# Patient Record
Sex: MALE | Race: WHITE | NOT HISPANIC OR LATINO | Employment: FULL TIME | URBAN - METROPOLITAN AREA
[De-identification: names, ages, dates, MRNs, and addresses within clinical notes are randomized per-mention and may not be internally consistent; named-entity substitution may affect disease eponyms.]

---

## 2023-01-01 ENCOUNTER — APPOINTMENT (OUTPATIENT)
Dept: RADIOLOGY | Facility: MEDICAL CENTER | Age: 63
End: 2023-01-01
Attending: RADIOLOGY
Payer: COMMERCIAL

## 2023-01-01 ENCOUNTER — APPOINTMENT (OUTPATIENT)
Dept: RADIOLOGY | Facility: MEDICAL CENTER | Age: 63
DRG: 388 | End: 2023-01-01
Attending: HOSPITALIST
Payer: COMMERCIAL

## 2023-01-01 ENCOUNTER — HOSPITAL ENCOUNTER (INPATIENT)
Facility: MEDICAL CENTER | Age: 63
LOS: 6 days | DRG: 919 | End: 2023-09-09
Attending: STUDENT IN AN ORGANIZED HEALTH CARE EDUCATION/TRAINING PROGRAM | Admitting: STUDENT IN AN ORGANIZED HEALTH CARE EDUCATION/TRAINING PROGRAM
Payer: COMMERCIAL

## 2023-01-01 ENCOUNTER — APPOINTMENT (OUTPATIENT)
Dept: RADIOLOGY | Facility: MEDICAL CENTER | Age: 63
End: 2023-01-01
Attending: INTERNAL MEDICINE
Payer: COMMERCIAL

## 2023-01-01 ENCOUNTER — APPOINTMENT (OUTPATIENT)
Dept: ADMISSIONS | Facility: MEDICAL CENTER | Age: 63
End: 2023-01-01
Attending: INTERNAL MEDICINE
Payer: COMMERCIAL

## 2023-01-01 ENCOUNTER — APPOINTMENT (OUTPATIENT)
Dept: RADIOLOGY | Facility: MEDICAL CENTER | Age: 63
DRG: 919 | End: 2023-01-01
Payer: COMMERCIAL

## 2023-01-01 ENCOUNTER — HOSPITAL ENCOUNTER (INPATIENT)
Facility: MEDICAL CENTER | Age: 63
LOS: 4 days | DRG: 657 | End: 2023-08-06
Attending: UROLOGY | Admitting: UROLOGY
Payer: COMMERCIAL

## 2023-01-01 ENCOUNTER — APPOINTMENT (OUTPATIENT)
Dept: RADIOLOGY | Facility: MEDICAL CENTER | Age: 63
End: 2023-01-01
Payer: COMMERCIAL

## 2023-01-01 ENCOUNTER — APPOINTMENT (OUTPATIENT)
Dept: ADMISSIONS | Facility: MEDICAL CENTER | Age: 63
DRG: 657 | End: 2023-01-01
Attending: UROLOGY
Payer: COMMERCIAL

## 2023-01-01 ENCOUNTER — PHARMACY VISIT (OUTPATIENT)
Dept: PHARMACY | Facility: MEDICAL CENTER | Age: 63
End: 2023-01-01
Payer: COMMERCIAL

## 2023-01-01 ENCOUNTER — APPOINTMENT (OUTPATIENT)
Dept: RADIOLOGY | Facility: MEDICAL CENTER | Age: 63
DRG: 388 | End: 2023-01-01
Attending: FAMILY MEDICINE
Payer: COMMERCIAL

## 2023-01-01 ENCOUNTER — ANESTHESIA (OUTPATIENT)
Dept: ANESTHESIOLOGY | Facility: MEDICAL CENTER | Age: 63
DRG: 657 | End: 2023-01-01
Payer: COMMERCIAL

## 2023-01-01 ENCOUNTER — APPOINTMENT (OUTPATIENT)
Dept: RADIOLOGY | Facility: MEDICAL CENTER | Age: 63
DRG: 388 | End: 2023-01-01
Payer: COMMERCIAL

## 2023-01-01 ENCOUNTER — TELEPHONE (OUTPATIENT)
Dept: HEALTH INFORMATION MANAGEMENT | Facility: OTHER | Age: 63
End: 2023-01-01

## 2023-01-01 ENCOUNTER — HOSPITAL ENCOUNTER (OUTPATIENT)
Dept: RADIOLOGY | Facility: MEDICAL CENTER | Age: 63
End: 2023-09-06
Payer: COMMERCIAL

## 2023-01-01 ENCOUNTER — HOSPITAL ENCOUNTER (INPATIENT)
Facility: MEDICAL CENTER | Age: 63
LOS: 16 days | DRG: 388 | End: 2023-11-27
Attending: HOSPITALIST | Admitting: HOSPITALIST
Payer: COMMERCIAL

## 2023-01-01 ENCOUNTER — ANESTHESIA EVENT (OUTPATIENT)
Dept: ANESTHESIOLOGY | Facility: MEDICAL CENTER | Age: 63
End: 2023-01-01

## 2023-01-01 ENCOUNTER — ANESTHESIA (OUTPATIENT)
Dept: SURGERY | Facility: MEDICAL CENTER | Age: 63
DRG: 657 | End: 2023-01-01
Payer: COMMERCIAL

## 2023-01-01 ENCOUNTER — APPOINTMENT (OUTPATIENT)
Dept: RADIOLOGY | Facility: MEDICAL CENTER | Age: 63
DRG: 388 | End: 2023-01-01
Attending: NURSE PRACTITIONER
Payer: COMMERCIAL

## 2023-01-01 ENCOUNTER — HOSPITAL ENCOUNTER (OUTPATIENT)
Facility: MEDICAL CENTER | Age: 63
End: 2023-09-27
Attending: INTERNAL MEDICINE | Admitting: INTERNAL MEDICINE
Payer: COMMERCIAL

## 2023-01-01 ENCOUNTER — ANESTHESIA EVENT (OUTPATIENT)
Dept: SURGERY | Facility: MEDICAL CENTER | Age: 63
DRG: 657 | End: 2023-01-01
Payer: COMMERCIAL

## 2023-01-01 ENCOUNTER — APPOINTMENT (OUTPATIENT)
Dept: RADIOLOGY | Facility: MEDICAL CENTER | Age: 63
DRG: 919 | End: 2023-01-01
Attending: RADIOLOGY
Payer: COMMERCIAL

## 2023-01-01 ENCOUNTER — ANESTHESIA (OUTPATIENT)
Dept: ANESTHESIOLOGY | Facility: MEDICAL CENTER | Age: 63
End: 2023-01-01

## 2023-01-01 VITALS
WEIGHT: 159.61 LBS | BODY MASS INDEX: 21.15 KG/M2 | HEART RATE: 133 BPM | OXYGEN SATURATION: 86 % | SYSTOLIC BLOOD PRESSURE: 45 MMHG | RESPIRATION RATE: 17 BRPM | TEMPERATURE: 102.9 F | DIASTOLIC BLOOD PRESSURE: 29 MMHG | HEIGHT: 73 IN

## 2023-01-01 VITALS
HEART RATE: 85 BPM | RESPIRATION RATE: 17 BRPM | BODY MASS INDEX: 21.27 KG/M2 | SYSTOLIC BLOOD PRESSURE: 121 MMHG | WEIGHT: 160.5 LBS | TEMPERATURE: 97.7 F | HEIGHT: 73 IN | DIASTOLIC BLOOD PRESSURE: 68 MMHG | OXYGEN SATURATION: 99 %

## 2023-01-01 VITALS
OXYGEN SATURATION: 92 % | SYSTOLIC BLOOD PRESSURE: 111 MMHG | DIASTOLIC BLOOD PRESSURE: 72 MMHG | TEMPERATURE: 98.1 F | BODY MASS INDEX: 24.37 KG/M2 | RESPIRATION RATE: 17 BRPM | HEART RATE: 84 BPM | HEIGHT: 73 IN | WEIGHT: 183.86 LBS

## 2023-01-01 VITALS
RESPIRATION RATE: 17 BRPM | WEIGHT: 165.12 LBS | TEMPERATURE: 96.6 F | OXYGEN SATURATION: 100 % | SYSTOLIC BLOOD PRESSURE: 109 MMHG | BODY MASS INDEX: 22.37 KG/M2 | DIASTOLIC BLOOD PRESSURE: 74 MMHG | HEIGHT: 72 IN | HEART RATE: 66 BPM

## 2023-01-01 DIAGNOSIS — R18.8 ABDOMINAL FLUID COLLECTION: ICD-10-CM

## 2023-01-01 DIAGNOSIS — R91.8 LUNG MASS: ICD-10-CM

## 2023-01-01 DIAGNOSIS — K85.90 ACUTE PANCREATITIS, UNSPECIFIED COMPLICATION STATUS, UNSPECIFIED PANCREATITIS TYPE: ICD-10-CM

## 2023-01-01 DIAGNOSIS — R91.8 LUNG MASS: Chronic | ICD-10-CM

## 2023-01-01 DIAGNOSIS — C64.2 RENAL CELL CARCINOMA OF LEFT KIDNEY METASTATIC TO OTHER SITE (HCC): ICD-10-CM

## 2023-01-01 DIAGNOSIS — C64.2 MALIGNANT NEOPLASM OF LEFT KIDNEY (HCC): ICD-10-CM

## 2023-01-01 LAB
ABO + RH BLD: NORMAL
ABO GROUP BLD: NORMAL
ABO GROUP BLD: NORMAL
ALBUMIN SERPL BCP-MCNC: 2.1 G/DL (ref 3.2–4.9)
ALBUMIN SERPL BCP-MCNC: 2.2 G/DL (ref 3.2–4.9)
ALBUMIN SERPL BCP-MCNC: 2.3 G/DL (ref 3.2–4.9)
ALBUMIN SERPL BCP-MCNC: 2.5 G/DL (ref 3.2–4.9)
ALBUMIN SERPL BCP-MCNC: 2.5 G/DL (ref 3.2–4.9)
ALBUMIN SERPL BCP-MCNC: 2.6 G/DL (ref 3.2–4.9)
ALBUMIN SERPL BCP-MCNC: 2.7 G/DL (ref 3.2–4.9)
ALBUMIN SERPL BCP-MCNC: 2.9 G/DL (ref 3.2–4.9)
ALBUMIN SERPL BCP-MCNC: 3.1 G/DL (ref 3.2–4.9)
ALBUMIN SERPL BCP-MCNC: 3.2 G/DL (ref 3.2–4.9)
ALBUMIN SERPL BCP-MCNC: 3.4 G/DL (ref 3.2–4.9)
ALBUMIN SERPL BCP-MCNC: 3.5 G/DL (ref 3.2–4.9)
ALBUMIN/GLOB SERPL: 0.7 G/DL
ALBUMIN/GLOB SERPL: 0.8 G/DL
ALBUMIN/GLOB SERPL: 0.8 G/DL
ALBUMIN/GLOB SERPL: 0.9 G/DL
ALBUMIN/GLOB SERPL: 1 G/DL
ALBUMIN/GLOB SERPL: 1 G/DL
ALBUMIN/GLOB SERPL: 1.1 G/DL
ALP SERPL-CCNC: 109 U/L (ref 30–99)
ALP SERPL-CCNC: 118 U/L (ref 30–99)
ALP SERPL-CCNC: 128 U/L (ref 30–99)
ALP SERPL-CCNC: 128 U/L (ref 30–99)
ALP SERPL-CCNC: 133 U/L (ref 30–99)
ALP SERPL-CCNC: 142 U/L (ref 30–99)
ALP SERPL-CCNC: 149 U/L (ref 30–99)
ALP SERPL-CCNC: 168 U/L (ref 30–99)
ALP SERPL-CCNC: 179 U/L (ref 30–99)
ALP SERPL-CCNC: 233 U/L (ref 30–99)
ALP SERPL-CCNC: 259 U/L (ref 30–99)
ALP SERPL-CCNC: 338 U/L (ref 30–99)
ALP SERPL-CCNC: 71 U/L (ref 30–99)
ALP SERPL-CCNC: 82 U/L (ref 30–99)
ALP SERPL-CCNC: 84 U/L (ref 30–99)
ALP SERPL-CCNC: 94 U/L (ref 30–99)
ALP SERPL-CCNC: 95 U/L (ref 30–99)
ALT SERPL-CCNC: 10 U/L (ref 2–50)
ALT SERPL-CCNC: 11 U/L (ref 2–50)
ALT SERPL-CCNC: 34 U/L (ref 2–50)
ALT SERPL-CCNC: 36 U/L (ref 2–50)
ALT SERPL-CCNC: 37 U/L (ref 2–50)
ALT SERPL-CCNC: 5 U/L (ref 2–50)
ALT SERPL-CCNC: 6 U/L (ref 2–50)
ALT SERPL-CCNC: 7 U/L (ref 2–50)
ALT SERPL-CCNC: 8 U/L (ref 2–50)
ALT SERPL-CCNC: 8 U/L (ref 2–50)
ALT SERPL-CCNC: <5 U/L (ref 2–50)
AMMONIA PLAS-SCNC: 10 UMOL/L (ref 11–45)
AMYLASE FLD-CCNC: 1052 U/L
ANION GAP SERPL CALC-SCNC: 10 MMOL/L (ref 7–16)
ANION GAP SERPL CALC-SCNC: 11 MMOL/L (ref 7–16)
ANION GAP SERPL CALC-SCNC: 12 MMOL/L (ref 7–16)
ANION GAP SERPL CALC-SCNC: 13 MMOL/L (ref 7–16)
ANION GAP SERPL CALC-SCNC: 14 MMOL/L (ref 7–16)
ANION GAP SERPL CALC-SCNC: 17 MMOL/L (ref 7–16)
ANION GAP SERPL CALC-SCNC: 7 MMOL/L (ref 7–16)
ANION GAP SERPL CALC-SCNC: 8 MMOL/L (ref 7–16)
ANION GAP SERPL CALC-SCNC: 8 MMOL/L (ref 7–16)
ANION GAP SERPL CALC-SCNC: 9 MMOL/L (ref 7–16)
ANION GAP SERPL CALC-SCNC: 9 MMOL/L (ref 7–16)
ANISOCYTOSIS BLD QL SMEAR: ABNORMAL
APTT PPP: 32.9 SEC (ref 24.7–36)
AST SERPL-CCNC: 11 U/L (ref 12–45)
AST SERPL-CCNC: 12 U/L (ref 12–45)
AST SERPL-CCNC: 12 U/L (ref 12–45)
AST SERPL-CCNC: 13 U/L (ref 12–45)
AST SERPL-CCNC: 13 U/L (ref 12–45)
AST SERPL-CCNC: 15 U/L (ref 12–45)
AST SERPL-CCNC: 15 U/L (ref 12–45)
AST SERPL-CCNC: 16 U/L (ref 12–45)
AST SERPL-CCNC: 17 U/L (ref 12–45)
AST SERPL-CCNC: 23 U/L (ref 12–45)
AST SERPL-CCNC: 27 U/L (ref 12–45)
AST SERPL-CCNC: 31 U/L (ref 12–45)
AST SERPL-CCNC: 39 U/L (ref 12–45)
AST SERPL-CCNC: 65 U/L (ref 12–45)
AST SERPL-CCNC: 8 U/L (ref 12–45)
AST SERPL-CCNC: 9 U/L (ref 12–45)
AST SERPL-CCNC: 9 U/L (ref 12–45)
BACTERIA BLD CULT: NORMAL
BACTERIA BLD CULT: NORMAL
BACTERIA SPEC ANAEROBE CULT: NORMAL
BACTERIA UR CULT: NORMAL
BACTERIA WND AEROBE CULT: NORMAL
BARCODED ABORH UBTYP: 5100
BARCODED PRD CODE UBPRD: NORMAL
BARCODED UNIT NUM UBUNT: NORMAL
BASE EXCESS BLDA CALC-SCNC: 5 MMOL/L (ref -4–3)
BASE EXCESS BLDA CALC-SCNC: 5 MMOL/L (ref -4–3)
BASOPHILS # BLD AUTO: 0.1 % (ref 0–1.8)
BASOPHILS # BLD AUTO: 0.2 % (ref 0–1.8)
BASOPHILS # BLD AUTO: 0.3 % (ref 0–1.8)
BASOPHILS # BLD AUTO: 0.3 % (ref 0–1.8)
BASOPHILS # BLD AUTO: 0.4 % (ref 0–1.8)
BASOPHILS # BLD AUTO: 0.4 % (ref 0–1.8)
BASOPHILS # BLD AUTO: 0.5 % (ref 0–1.8)
BASOPHILS # BLD AUTO: 0.6 % (ref 0–1.8)
BASOPHILS # BLD: 0.02 K/UL (ref 0–0.12)
BASOPHILS # BLD: 0.02 K/UL (ref 0–0.12)
BASOPHILS # BLD: 0.03 K/UL (ref 0–0.12)
BASOPHILS # BLD: 0.03 K/UL (ref 0–0.12)
BASOPHILS # BLD: 0.04 K/UL (ref 0–0.12)
BASOPHILS # BLD: 0.04 K/UL (ref 0–0.12)
BASOPHILS # BLD: 0.05 K/UL (ref 0–0.12)
BASOPHILS # BLD: 0.05 K/UL (ref 0–0.12)
BILIRUB SERPL-MCNC: 0.2 MG/DL (ref 0.1–1.5)
BILIRUB SERPL-MCNC: 0.3 MG/DL (ref 0.1–1.5)
BILIRUB SERPL-MCNC: 0.4 MG/DL (ref 0.1–1.5)
BILIRUB SERPL-MCNC: 0.9 MG/DL (ref 0.1–1.5)
BLD GP AB SCN SERPL QL: NORMAL
BLD GP AB SCN SERPL QL: NORMAL
BODY FLD TYPE: NORMAL
BODY TEMPERATURE: ABNORMAL DEGREES
BODY TEMPERATURE: ABNORMAL DEGREES
BUN SERPL-MCNC: 11 MG/DL (ref 8–22)
BUN SERPL-MCNC: 11 MG/DL (ref 8–22)
BUN SERPL-MCNC: 12 MG/DL (ref 8–22)
BUN SERPL-MCNC: 14 MG/DL (ref 8–22)
BUN SERPL-MCNC: 15 MG/DL (ref 8–22)
BUN SERPL-MCNC: 15 MG/DL (ref 8–22)
BUN SERPL-MCNC: 16 MG/DL (ref 8–22)
BUN SERPL-MCNC: 16 MG/DL (ref 8–22)
BUN SERPL-MCNC: 17 MG/DL (ref 8–22)
BUN SERPL-MCNC: 17 MG/DL (ref 8–22)
BUN SERPL-MCNC: 21 MG/DL (ref 8–22)
BUN SERPL-MCNC: 24 MG/DL (ref 8–22)
BUN SERPL-MCNC: 35 MG/DL (ref 8–22)
BUN SERPL-MCNC: 37 MG/DL (ref 8–22)
BUN SERPL-MCNC: 38 MG/DL (ref 8–22)
BUN SERPL-MCNC: 41 MG/DL (ref 8–22)
BUN SERPL-MCNC: 7 MG/DL (ref 8–22)
BUN SERPL-MCNC: 7 MG/DL (ref 8–22)
BUN SERPL-MCNC: 8 MG/DL (ref 8–22)
BUN SERPL-MCNC: 9 MG/DL (ref 8–22)
CA-I BLD ISE-SCNC: 1.06 MMOL/L (ref 1.1–1.3)
CA-I BLD ISE-SCNC: 1.11 MMOL/L (ref 1.1–1.3)
CA-I SERPL-SCNC: 1.1 MMOL/L (ref 1.1–1.3)
CA-I SERPL-SCNC: 1.3 MMOL/L (ref 1.1–1.3)
CALCIUM ALBUM COR SERPL-MCNC: 10.2 MG/DL (ref 8.5–10.5)
CALCIUM ALBUM COR SERPL-MCNC: 9 MG/DL (ref 8.5–10.5)
CALCIUM ALBUM COR SERPL-MCNC: 9.2 MG/DL (ref 8.5–10.5)
CALCIUM ALBUM COR SERPL-MCNC: 9.2 MG/DL (ref 8.5–10.5)
CALCIUM ALBUM COR SERPL-MCNC: 9.3 MG/DL (ref 8.5–10.5)
CALCIUM ALBUM COR SERPL-MCNC: 9.4 MG/DL (ref 8.5–10.5)
CALCIUM ALBUM COR SERPL-MCNC: 9.4 MG/DL (ref 8.5–10.5)
CALCIUM ALBUM COR SERPL-MCNC: 9.5 MG/DL (ref 8.5–10.5)
CALCIUM ALBUM COR SERPL-MCNC: 9.5 MG/DL (ref 8.5–10.5)
CALCIUM ALBUM COR SERPL-MCNC: 9.6 MG/DL (ref 8.5–10.5)
CALCIUM ALBUM COR SERPL-MCNC: 9.7 MG/DL (ref 8.5–10.5)
CALCIUM ALBUM COR SERPL-MCNC: 9.7 MG/DL (ref 8.5–10.5)
CALCIUM ALBUM COR SERPL-MCNC: 9.8 MG/DL (ref 8.5–10.5)
CALCIUM ALBUM COR SERPL-MCNC: 9.8 MG/DL (ref 8.5–10.5)
CALCIUM ALBUM COR SERPL-MCNC: 9.9 MG/DL (ref 8.5–10.5)
CALCIUM SERPL-MCNC: 7.8 MG/DL (ref 8.5–10.5)
CALCIUM SERPL-MCNC: 8 MG/DL (ref 8.5–10.5)
CALCIUM SERPL-MCNC: 8.1 MG/DL (ref 8.5–10.5)
CALCIUM SERPL-MCNC: 8.1 MG/DL (ref 8.5–10.5)
CALCIUM SERPL-MCNC: 8.2 MG/DL (ref 8.5–10.5)
CALCIUM SERPL-MCNC: 8.3 MG/DL (ref 8.5–10.5)
CALCIUM SERPL-MCNC: 8.4 MG/DL (ref 8.5–10.5)
CALCIUM SERPL-MCNC: 8.5 MG/DL (ref 8.5–10.5)
CALCIUM SERPL-MCNC: 8.6 MG/DL (ref 8.5–10.5)
CALCIUM SERPL-MCNC: 8.7 MG/DL (ref 8.5–10.5)
CALCIUM SERPL-MCNC: 8.8 MG/DL (ref 8.5–10.5)
CALCIUM SERPL-MCNC: 8.9 MG/DL (ref 8.5–10.5)
CALCIUM SERPL-MCNC: 9 MG/DL (ref 8.5–10.5)
CALCIUM SERPL-MCNC: 9.2 MG/DL (ref 8.5–10.5)
CALCIUM SERPL-MCNC: 9.2 MG/DL (ref 8.5–10.5)
CANCER AG19-9 SERPL-ACNC: 12 U/ML (ref 0–35)
CHLORIDE SERPL-SCNC: 100 MMOL/L (ref 96–112)
CHLORIDE SERPL-SCNC: 100 MMOL/L (ref 96–112)
CHLORIDE SERPL-SCNC: 101 MMOL/L (ref 96–112)
CHLORIDE SERPL-SCNC: 102 MMOL/L (ref 96–112)
CHLORIDE SERPL-SCNC: 104 MMOL/L (ref 96–112)
CHLORIDE SERPL-SCNC: 105 MMOL/L (ref 96–112)
CHLORIDE SERPL-SCNC: 95 MMOL/L (ref 96–112)
CHLORIDE SERPL-SCNC: 96 MMOL/L (ref 96–112)
CHLORIDE SERPL-SCNC: 97 MMOL/L (ref 96–112)
CHLORIDE SERPL-SCNC: 98 MMOL/L (ref 96–112)
CHLORIDE SERPL-SCNC: 98 MMOL/L (ref 96–112)
CHLORIDE SERPL-SCNC: 99 MMOL/L (ref 96–112)
CHLORIDE SERPL-SCNC: 99 MMOL/L (ref 96–112)
CHOLEST SERPL-MCNC: 91 MG/DL (ref 100–199)
CHOLEST SERPL-MCNC: 99 MG/DL (ref 100–199)
CO2 BLDA-SCNC: 30 MMOL/L (ref 20–33)
CO2 BLDA-SCNC: 31 MMOL/L (ref 20–33)
CO2 SERPL-SCNC: 21 MMOL/L (ref 20–33)
CO2 SERPL-SCNC: 23 MMOL/L (ref 20–33)
CO2 SERPL-SCNC: 24 MMOL/L (ref 20–33)
CO2 SERPL-SCNC: 25 MMOL/L (ref 20–33)
CO2 SERPL-SCNC: 26 MMOL/L (ref 20–33)
CO2 SERPL-SCNC: 27 MMOL/L (ref 20–33)
COMMENT 1642: NORMAL
COMMENT 1642: NORMAL
COMPONENT R 8504R: NORMAL
CREAT SERPL-MCNC: 0.6 MG/DL (ref 0.5–1.4)
CREAT SERPL-MCNC: 0.66 MG/DL (ref 0.5–1.4)
CREAT SERPL-MCNC: 0.67 MG/DL (ref 0.5–1.4)
CREAT SERPL-MCNC: 0.67 MG/DL (ref 0.5–1.4)
CREAT SERPL-MCNC: 0.7 MG/DL (ref 0.5–1.4)
CREAT SERPL-MCNC: 0.74 MG/DL (ref 0.5–1.4)
CREAT SERPL-MCNC: 0.74 MG/DL (ref 0.5–1.4)
CREAT SERPL-MCNC: 0.8 MG/DL (ref 0.5–1.4)
CREAT SERPL-MCNC: 0.81 MG/DL (ref 0.5–1.4)
CREAT SERPL-MCNC: 0.83 MG/DL (ref 0.5–1.4)
CREAT SERPL-MCNC: 0.85 MG/DL (ref 0.5–1.4)
CREAT SERPL-MCNC: 0.93 MG/DL (ref 0.5–1.4)
CREAT SERPL-MCNC: 0.95 MG/DL (ref 0.5–1.4)
CREAT SERPL-MCNC: 0.96 MG/DL (ref 0.5–1.4)
CREAT SERPL-MCNC: 1 MG/DL (ref 0.5–1.4)
CREAT SERPL-MCNC: 1.05 MG/DL (ref 0.5–1.4)
CREAT SERPL-MCNC: 1.06 MG/DL (ref 0.5–1.4)
CREAT SERPL-MCNC: 1.1 MG/DL (ref 0.5–1.4)
CREAT SERPL-MCNC: 1.15 MG/DL (ref 0.5–1.4)
CREAT SERPL-MCNC: 1.18 MG/DL (ref 0.5–1.4)
CREAT SERPL-MCNC: 1.18 MG/DL (ref 0.5–1.4)
CREAT SERPL-MCNC: 1.29 MG/DL (ref 0.5–1.4)
CREAT SERPL-MCNC: 1.4 MG/DL (ref 0.5–1.4)
DELSYS IDSYS: ABNORMAL
DELSYS IDSYS: ABNORMAL
EKG IMPRESSION: NORMAL
END TIDAL CARBON DIOXIDE IECO2: 31 MMHG
END TIDAL CARBON DIOXIDE IECO2: 32 MMHG
EOSINOPHIL # BLD AUTO: 0.02 K/UL (ref 0–0.51)
EOSINOPHIL # BLD AUTO: 0.03 K/UL (ref 0–0.51)
EOSINOPHIL # BLD AUTO: 0.06 K/UL (ref 0–0.51)
EOSINOPHIL # BLD AUTO: 0.06 K/UL (ref 0–0.51)
EOSINOPHIL # BLD AUTO: 0.07 K/UL (ref 0–0.51)
EOSINOPHIL # BLD AUTO: 0.12 K/UL (ref 0–0.51)
EOSINOPHIL # BLD AUTO: 0.16 K/UL (ref 0–0.51)
EOSINOPHIL # BLD AUTO: 0.26 K/UL (ref 0–0.51)
EOSINOPHIL NFR BLD: 0.1 % (ref 0–6.9)
EOSINOPHIL NFR BLD: 0.3 % (ref 0–6.9)
EOSINOPHIL NFR BLD: 0.6 % (ref 0–6.9)
EOSINOPHIL NFR BLD: 0.6 % (ref 0–6.9)
EOSINOPHIL NFR BLD: 0.8 % (ref 0–6.9)
EOSINOPHIL NFR BLD: 1.2 % (ref 0–6.9)
EOSINOPHIL NFR BLD: 1.5 % (ref 0–6.9)
EOSINOPHIL NFR BLD: 3.4 % (ref 0–6.9)
ERYTHROCYTE [DISTWIDTH] IN BLOOD BY AUTOMATED COUNT: 44.7 FL (ref 35.9–50)
ERYTHROCYTE [DISTWIDTH] IN BLOOD BY AUTOMATED COUNT: 45.7 FL (ref 35.9–50)
ERYTHROCYTE [DISTWIDTH] IN BLOOD BY AUTOMATED COUNT: 47 FL (ref 35.9–50)
ERYTHROCYTE [DISTWIDTH] IN BLOOD BY AUTOMATED COUNT: 47 FL (ref 35.9–50)
ERYTHROCYTE [DISTWIDTH] IN BLOOD BY AUTOMATED COUNT: 47.1 FL (ref 35.9–50)
ERYTHROCYTE [DISTWIDTH] IN BLOOD BY AUTOMATED COUNT: 47.2 FL (ref 35.9–50)
ERYTHROCYTE [DISTWIDTH] IN BLOOD BY AUTOMATED COUNT: 47.7 FL (ref 35.9–50)
ERYTHROCYTE [DISTWIDTH] IN BLOOD BY AUTOMATED COUNT: 47.8 FL (ref 35.9–50)
ERYTHROCYTE [DISTWIDTH] IN BLOOD BY AUTOMATED COUNT: 47.9 FL (ref 35.9–50)
ERYTHROCYTE [DISTWIDTH] IN BLOOD BY AUTOMATED COUNT: 49.3 FL (ref 35.9–50)
ERYTHROCYTE [DISTWIDTH] IN BLOOD BY AUTOMATED COUNT: 49.7 FL (ref 35.9–50)
ERYTHROCYTE [DISTWIDTH] IN BLOOD BY AUTOMATED COUNT: 50.2 FL (ref 35.9–50)
ERYTHROCYTE [DISTWIDTH] IN BLOOD BY AUTOMATED COUNT: 50.4 FL (ref 35.9–50)
ERYTHROCYTE [DISTWIDTH] IN BLOOD BY AUTOMATED COUNT: 50.6 FL (ref 35.9–50)
ERYTHROCYTE [DISTWIDTH] IN BLOOD BY AUTOMATED COUNT: 50.7 FL (ref 35.9–50)
ERYTHROCYTE [DISTWIDTH] IN BLOOD BY AUTOMATED COUNT: 50.8 FL (ref 35.9–50)
ERYTHROCYTE [DISTWIDTH] IN BLOOD BY AUTOMATED COUNT: 52.6 FL (ref 35.9–50)
ERYTHROCYTE [DISTWIDTH] IN BLOOD BY AUTOMATED COUNT: 52.7 FL (ref 35.9–50)
ERYTHROCYTE [DISTWIDTH] IN BLOOD BY AUTOMATED COUNT: 54.3 FL (ref 35.9–50)
ERYTHROCYTE [DISTWIDTH] IN BLOOD BY AUTOMATED COUNT: 57.1 FL (ref 35.9–50)
ERYTHROCYTE [DISTWIDTH] IN BLOOD BY AUTOMATED COUNT: 58 FL (ref 35.9–50)
ERYTHROCYTE [DISTWIDTH] IN BLOOD BY AUTOMATED COUNT: 58.8 FL (ref 35.9–50)
ERYTHROCYTE [DISTWIDTH] IN BLOOD BY AUTOMATED COUNT: 59.7 FL (ref 35.9–50)
ERYTHROCYTE [DISTWIDTH] IN BLOOD BY AUTOMATED COUNT: 63.2 FL (ref 35.9–50)
EST. AVERAGE GLUCOSE BLD GHB EST-MCNC: 108 MG/DL
GFR SERPLBLD CREATININE-BSD FMLA CKD-EPI: 102 ML/MIN/1.73 M 2
GFR SERPLBLD CREATININE-BSD FMLA CKD-EPI: 102 ML/MIN/1.73 M 2
GFR SERPLBLD CREATININE-BSD FMLA CKD-EPI: 103 ML/MIN/1.73 M 2
GFR SERPLBLD CREATININE-BSD FMLA CKD-EPI: 105 ML/MIN/1.73 M 2
GFR SERPLBLD CREATININE-BSD FMLA CKD-EPI: 108 ML/MIN/1.73 M 2
GFR SERPLBLD CREATININE-BSD FMLA CKD-EPI: 56 ML/MIN/1.73 M 2
GFR SERPLBLD CREATININE-BSD FMLA CKD-EPI: 62 ML/MIN/1.73 M 2
GFR SERPLBLD CREATININE-BSD FMLA CKD-EPI: 69 ML/MIN/1.73 M 2
GFR SERPLBLD CREATININE-BSD FMLA CKD-EPI: 69 ML/MIN/1.73 M 2
GFR SERPLBLD CREATININE-BSD FMLA CKD-EPI: 71 ML/MIN/1.73 M 2
GFR SERPLBLD CREATININE-BSD FMLA CKD-EPI: 75 ML/MIN/1.73 M 2
GFR SERPLBLD CREATININE-BSD FMLA CKD-EPI: 79 ML/MIN/1.73 M 2
GFR SERPLBLD CREATININE-BSD FMLA CKD-EPI: 80 ML/MIN/1.73 M 2
GFR SERPLBLD CREATININE-BSD FMLA CKD-EPI: 84 ML/MIN/1.73 M 2
GFR SERPLBLD CREATININE-BSD FMLA CKD-EPI: 89 ML/MIN/1.73 M 2
GFR SERPLBLD CREATININE-BSD FMLA CKD-EPI: 90 ML/MIN/1.73 M 2
GFR SERPLBLD CREATININE-BSD FMLA CKD-EPI: 92 ML/MIN/1.73 M 2
GFR SERPLBLD CREATININE-BSD FMLA CKD-EPI: 97 ML/MIN/1.73 M 2
GFR SERPLBLD CREATININE-BSD FMLA CKD-EPI: 98 ML/MIN/1.73 M 2
GFR SERPLBLD CREATININE-BSD FMLA CKD-EPI: 99 ML/MIN/1.73 M 2
GFR SERPLBLD CREATININE-BSD FMLA CKD-EPI: 99 ML/MIN/1.73 M 2
GLOBULIN SER CALC-MCNC: 3 G/DL (ref 1.9–3.5)
GLOBULIN SER CALC-MCNC: 3.1 G/DL (ref 1.9–3.5)
GLOBULIN SER CALC-MCNC: 3.3 G/DL (ref 1.9–3.5)
GLOBULIN SER CALC-MCNC: 3.5 G/DL (ref 1.9–3.5)
GLOBULIN SER CALC-MCNC: 3.6 G/DL (ref 1.9–3.5)
GLOBULIN SER CALC-MCNC: 3.7 G/DL (ref 1.9–3.5)
GLOBULIN SER CALC-MCNC: 3.8 G/DL (ref 1.9–3.5)
GLOBULIN SER CALC-MCNC: 3.9 G/DL (ref 1.9–3.5)
GLUCOSE BLD STRIP.AUTO-MCNC: 110 MG/DL (ref 65–99)
GLUCOSE BLD STRIP.AUTO-MCNC: 110 MG/DL (ref 65–99)
GLUCOSE BLD STRIP.AUTO-MCNC: 112 MG/DL (ref 65–99)
GLUCOSE BLD STRIP.AUTO-MCNC: 113 MG/DL (ref 65–99)
GLUCOSE BLD STRIP.AUTO-MCNC: 124 MG/DL (ref 65–99)
GLUCOSE BLD STRIP.AUTO-MCNC: 128 MG/DL (ref 65–99)
GLUCOSE BLD STRIP.AUTO-MCNC: 133 MG/DL (ref 65–99)
GLUCOSE BLD STRIP.AUTO-MCNC: 134 MG/DL (ref 65–99)
GLUCOSE BLD STRIP.AUTO-MCNC: 134 MG/DL (ref 65–99)
GLUCOSE BLD STRIP.AUTO-MCNC: 143 MG/DL (ref 65–99)
GLUCOSE BLD STRIP.AUTO-MCNC: 144 MG/DL (ref 65–99)
GLUCOSE BLD STRIP.AUTO-MCNC: 146 MG/DL (ref 65–99)
GLUCOSE BLD STRIP.AUTO-MCNC: 152 MG/DL (ref 65–99)
GLUCOSE BLD STRIP.AUTO-MCNC: 159 MG/DL (ref 65–99)
GLUCOSE BLD STRIP.AUTO-MCNC: 160 MG/DL (ref 65–99)
GLUCOSE BLD STRIP.AUTO-MCNC: 166 MG/DL (ref 65–99)
GLUCOSE BLD STRIP.AUTO-MCNC: 167 MG/DL (ref 65–99)
GLUCOSE BLD STRIP.AUTO-MCNC: 170 MG/DL (ref 65–99)
GLUCOSE BLD STRIP.AUTO-MCNC: 170 MG/DL (ref 65–99)
GLUCOSE BLD STRIP.AUTO-MCNC: 172 MG/DL (ref 65–99)
GLUCOSE BLD STRIP.AUTO-MCNC: 172 MG/DL (ref 65–99)
GLUCOSE BLD STRIP.AUTO-MCNC: 173 MG/DL (ref 65–99)
GLUCOSE BLD STRIP.AUTO-MCNC: 176 MG/DL (ref 65–99)
GLUCOSE BLD STRIP.AUTO-MCNC: 184 MG/DL (ref 65–99)
GLUCOSE BLD STRIP.AUTO-MCNC: 191 MG/DL (ref 65–99)
GLUCOSE BLD STRIP.AUTO-MCNC: 198 MG/DL (ref 65–99)
GLUCOSE BLD STRIP.AUTO-MCNC: 201 MG/DL (ref 65–99)
GLUCOSE BLD STRIP.AUTO-MCNC: 216 MG/DL (ref 65–99)
GLUCOSE BLD STRIP.AUTO-MCNC: 217 MG/DL (ref 65–99)
GLUCOSE BLD STRIP.AUTO-MCNC: 224 MG/DL (ref 65–99)
GLUCOSE BLD STRIP.AUTO-MCNC: 226 MG/DL (ref 65–99)
GLUCOSE BLD STRIP.AUTO-MCNC: 230 MG/DL (ref 65–99)
GLUCOSE BLD STRIP.AUTO-MCNC: 233 MG/DL (ref 65–99)
GLUCOSE BLD STRIP.AUTO-MCNC: 233 MG/DL (ref 65–99)
GLUCOSE BLD STRIP.AUTO-MCNC: 237 MG/DL (ref 65–99)
GLUCOSE BLD STRIP.AUTO-MCNC: 78 MG/DL (ref 65–99)
GLUCOSE BLD STRIP.AUTO-MCNC: 96 MG/DL (ref 65–99)
GLUCOSE SERPL-MCNC: 101 MG/DL (ref 65–99)
GLUCOSE SERPL-MCNC: 105 MG/DL (ref 65–99)
GLUCOSE SERPL-MCNC: 106 MG/DL (ref 65–99)
GLUCOSE SERPL-MCNC: 107 MG/DL (ref 65–99)
GLUCOSE SERPL-MCNC: 114 MG/DL (ref 65–99)
GLUCOSE SERPL-MCNC: 120 MG/DL (ref 65–99)
GLUCOSE SERPL-MCNC: 130 MG/DL (ref 65–99)
GLUCOSE SERPL-MCNC: 132 MG/DL (ref 65–99)
GLUCOSE SERPL-MCNC: 133 MG/DL (ref 65–99)
GLUCOSE SERPL-MCNC: 140 MG/DL (ref 65–99)
GLUCOSE SERPL-MCNC: 147 MG/DL (ref 65–99)
GLUCOSE SERPL-MCNC: 151 MG/DL (ref 65–99)
GLUCOSE SERPL-MCNC: 154 MG/DL (ref 65–99)
GLUCOSE SERPL-MCNC: 159 MG/DL (ref 65–99)
GLUCOSE SERPL-MCNC: 170 MG/DL (ref 65–99)
GLUCOSE SERPL-MCNC: 173 MG/DL (ref 65–99)
GLUCOSE SERPL-MCNC: 179 MG/DL (ref 65–99)
GLUCOSE SERPL-MCNC: 184 MG/DL (ref 65–99)
GLUCOSE SERPL-MCNC: 200 MG/DL (ref 65–99)
GLUCOSE SERPL-MCNC: 234 MG/DL (ref 65–99)
GLUCOSE SERPL-MCNC: 238 MG/DL (ref 65–99)
GLUCOSE SERPL-MCNC: 275 MG/DL (ref 65–99)
GLUCOSE SERPL-MCNC: 90 MG/DL (ref 65–99)
GLUCOSE SERPL-MCNC: 95 MG/DL (ref 65–99)
GLUCOSE SERPL-MCNC: 98 MG/DL (ref 65–99)
GRAM STN SPEC: NORMAL
GRAM STN SPEC: NORMAL
HBA1C MFR BLD: 5.4 % (ref 4–5.6)
HCO3 BLDA-SCNC: 29 MMOL/L (ref 17–25)
HCO3 BLDA-SCNC: 29.5 MMOL/L (ref 17–25)
HCT VFR BLD AUTO: 22.3 % (ref 42–52)
HCT VFR BLD AUTO: 23.2 % (ref 42–52)
HCT VFR BLD AUTO: 23.2 % (ref 42–52)
HCT VFR BLD AUTO: 23.5 % (ref 42–52)
HCT VFR BLD AUTO: 23.8 % (ref 42–52)
HCT VFR BLD AUTO: 23.9 % (ref 42–52)
HCT VFR BLD AUTO: 24.1 % (ref 42–52)
HCT VFR BLD AUTO: 24.2 % (ref 42–52)
HCT VFR BLD AUTO: 24.4 % (ref 42–52)
HCT VFR BLD AUTO: 24.4 % (ref 42–52)
HCT VFR BLD AUTO: 24.8 % (ref 42–52)
HCT VFR BLD AUTO: 25.4 % (ref 42–52)
HCT VFR BLD AUTO: 25.5 % (ref 42–52)
HCT VFR BLD AUTO: 26.6 % (ref 42–52)
HCT VFR BLD AUTO: 32.5 % (ref 42–52)
HCT VFR BLD AUTO: 32.5 % (ref 42–52)
HCT VFR BLD AUTO: 33 % (ref 42–52)
HCT VFR BLD AUTO: 33.6 % (ref 42–52)
HCT VFR BLD AUTO: 33.7 % (ref 42–52)
HCT VFR BLD AUTO: 34.4 % (ref 42–52)
HCT VFR BLD AUTO: 34.5 % (ref 42–52)
HCT VFR BLD AUTO: 35.2 % (ref 42–52)
HCT VFR BLD AUTO: 35.8 % (ref 42–52)
HCT VFR BLD CALC: 31 % (ref 42–52)
HCT VFR BLD CALC: 32 % (ref 42–52)
HGB BLD-MCNC: 10.1 G/DL (ref 14–18)
HGB BLD-MCNC: 10.2 G/DL (ref 14–18)
HGB BLD-MCNC: 10.2 G/DL (ref 14–18)
HGB BLD-MCNC: 10.4 G/DL (ref 14–18)
HGB BLD-MCNC: 10.5 G/DL (ref 14–18)
HGB BLD-MCNC: 10.8 G/DL (ref 14–18)
HGB BLD-MCNC: 10.9 G/DL (ref 14–18)
HGB BLD-MCNC: 11.1 G/DL (ref 14–18)
HGB BLD-MCNC: 11.4 G/DL (ref 14–18)
HGB BLD-MCNC: 6.8 G/DL (ref 14–18)
HGB BLD-MCNC: 7 G/DL (ref 14–18)
HGB BLD-MCNC: 7 G/DL (ref 14–18)
HGB BLD-MCNC: 7.1 G/DL (ref 14–18)
HGB BLD-MCNC: 7.1 G/DL (ref 14–18)
HGB BLD-MCNC: 7.2 G/DL (ref 14–18)
HGB BLD-MCNC: 7.3 G/DL (ref 14–18)
HGB BLD-MCNC: 7.4 G/DL (ref 14–18)
HGB BLD-MCNC: 7.5 G/DL (ref 14–18)
HGB BLD-MCNC: 7.5 G/DL (ref 14–18)
HGB BLD-MCNC: 7.6 G/DL (ref 14–18)
HGB BLD-MCNC: 7.7 G/DL (ref 14–18)
HGB BLD-MCNC: 7.7 G/DL (ref 14–18)
HGB BLD-MCNC: 7.8 G/DL (ref 14–18)
HGB BLD-MCNC: 8.1 G/DL (ref 14–18)
HGB RETIC QN AUTO: 24.5 PG/CELL (ref 29–35)
HYPOCHROMIA BLD QL SMEAR: ABNORMAL
IMM GRANULOCYTES # BLD AUTO: 0.03 K/UL (ref 0–0.11)
IMM GRANULOCYTES # BLD AUTO: 0.03 K/UL (ref 0–0.11)
IMM GRANULOCYTES # BLD AUTO: 0.05 K/UL (ref 0–0.11)
IMM GRANULOCYTES # BLD AUTO: 0.05 K/UL (ref 0–0.11)
IMM GRANULOCYTES # BLD AUTO: 0.06 K/UL (ref 0–0.11)
IMM GRANULOCYTES # BLD AUTO: 0.07 K/UL (ref 0–0.11)
IMM GRANULOCYTES # BLD AUTO: 0.08 K/UL (ref 0–0.11)
IMM GRANULOCYTES # BLD AUTO: 0.14 K/UL (ref 0–0.11)
IMM GRANULOCYTES NFR BLD AUTO: 0.3 % (ref 0–0.9)
IMM GRANULOCYTES NFR BLD AUTO: 0.4 % (ref 0–0.9)
IMM GRANULOCYTES NFR BLD AUTO: 0.5 % (ref 0–0.9)
IMM GRANULOCYTES NFR BLD AUTO: 0.5 % (ref 0–0.9)
IMM GRANULOCYTES NFR BLD AUTO: 0.7 % (ref 0–0.9)
IMM GRANULOCYTES NFR BLD AUTO: 0.8 % (ref 0–0.9)
IMM GRANULOCYTES NFR BLD AUTO: 0.8 % (ref 0–0.9)
IMM GRANULOCYTES NFR BLD AUTO: 0.9 % (ref 0–0.9)
IMM RETICS NFR: 24.6 % (ref 2.6–16.1)
INR PPP: 1.06 (ref 0.87–1.13)
INR PPP: 1.07 (ref 0.87–1.13)
INR PPP: 1.12 (ref 0.87–1.13)
INR PPP: 1.15 (ref 0.87–1.13)
INR PPP: 1.29 (ref 0.87–1.13)
IRON SATN MFR SERPL: 8 % (ref 15–55)
IRON SERPL-MCNC: 11 UG/DL (ref 50–180)
LACTATE SERPL-SCNC: 0.7 MMOL/L (ref 0.5–2)
LACTATE SERPL-SCNC: 0.8 MMOL/L (ref 0.5–2)
LACTATE SERPL-SCNC: 1.1 MMOL/L (ref 0.5–2)
LACTATE SERPL-SCNC: 1.3 MMOL/L (ref 0.5–2)
LACTATE SERPL-SCNC: 1.4 MMOL/L (ref 0.5–2)
LACTATE SERPL-SCNC: 1.6 MMOL/L (ref 0.5–2)
LACTATE SERPL-SCNC: 1.8 MMOL/L (ref 0.5–2)
LIPASE FLD-CCNC: 3402 U/L
LIPASE SERPL-CCNC: 271 U/L (ref 11–82)
LYMPHOCYTES # BLD AUTO: 0.62 K/UL (ref 1–4.8)
LYMPHOCYTES # BLD AUTO: 0.82 K/UL (ref 1–4.8)
LYMPHOCYTES # BLD AUTO: 0.82 K/UL (ref 1–4.8)
LYMPHOCYTES # BLD AUTO: 0.94 K/UL (ref 1–4.8)
LYMPHOCYTES # BLD AUTO: 0.99 K/UL (ref 1–4.8)
LYMPHOCYTES # BLD AUTO: 1 K/UL (ref 1–4.8)
LYMPHOCYTES # BLD AUTO: 1.12 K/UL (ref 1–4.8)
LYMPHOCYTES # BLD AUTO: 1.5 K/UL (ref 1–4.8)
LYMPHOCYTES NFR BLD: 11.6 % (ref 22–41)
LYMPHOCYTES NFR BLD: 19.5 % (ref 22–41)
LYMPHOCYTES NFR BLD: 4.1 % (ref 22–41)
LYMPHOCYTES NFR BLD: 9 % (ref 22–41)
LYMPHOCYTES NFR BLD: 9.2 % (ref 22–41)
LYMPHOCYTES NFR BLD: 9.5 % (ref 22–41)
LYMPHOCYTES NFR BLD: 9.5 % (ref 22–41)
LYMPHOCYTES NFR BLD: 9.9 % (ref 22–41)
MAGNESIUM SERPL-MCNC: 1.7 MG/DL (ref 1.5–2.5)
MAGNESIUM SERPL-MCNC: 1.8 MG/DL (ref 1.5–2.5)
MAGNESIUM SERPL-MCNC: 1.9 MG/DL (ref 1.5–2.5)
MAGNESIUM SERPL-MCNC: 2 MG/DL (ref 1.5–2.5)
MAGNESIUM SERPL-MCNC: 2 MG/DL (ref 1.5–2.5)
MAGNESIUM SERPL-MCNC: 2.1 MG/DL (ref 1.5–2.5)
MAGNESIUM SERPL-MCNC: 2.2 MG/DL (ref 1.5–2.5)
MCH RBC QN AUTO: 25.7 PG (ref 27–33)
MCH RBC QN AUTO: 25.9 PG (ref 27–33)
MCH RBC QN AUTO: 26 PG (ref 27–33)
MCH RBC QN AUTO: 26.2 PG (ref 27–33)
MCH RBC QN AUTO: 26.2 PG (ref 27–33)
MCH RBC QN AUTO: 26.3 PG (ref 27–33)
MCH RBC QN AUTO: 26.4 PG (ref 27–33)
MCH RBC QN AUTO: 26.5 PG (ref 27–33)
MCH RBC QN AUTO: 26.6 PG (ref 27–33)
MCH RBC QN AUTO: 26.6 PG (ref 27–33)
MCH RBC QN AUTO: 26.8 PG (ref 27–33)
MCH RBC QN AUTO: 27 PG (ref 27–33)
MCH RBC QN AUTO: 27.1 PG (ref 27–33)
MCH RBC QN AUTO: 27.2 PG (ref 27–33)
MCH RBC QN AUTO: 27.2 PG (ref 27–33)
MCH RBC QN AUTO: 27.3 PG (ref 27–33)
MCH RBC QN AUTO: 27.4 PG (ref 27–33)
MCH RBC QN AUTO: 27.6 PG (ref 27–33)
MCH RBC QN AUTO: 27.6 PG (ref 27–33)
MCH RBC QN AUTO: 27.7 PG (ref 27–33)
MCH RBC QN AUTO: 27.8 PG (ref 27–33)
MCH RBC QN AUTO: 28.6 PG (ref 27–33)
MCHC RBC AUTO-ENTMCNC: 29.4 G/DL (ref 32.3–36.5)
MCHC RBC AUTO-ENTMCNC: 29.5 G/DL (ref 32.3–36.5)
MCHC RBC AUTO-ENTMCNC: 29.7 G/DL (ref 32.3–36.5)
MCHC RBC AUTO-ENTMCNC: 29.9 G/DL (ref 32.3–36.5)
MCHC RBC AUTO-ENTMCNC: 30.2 G/DL (ref 32.3–36.5)
MCHC RBC AUTO-ENTMCNC: 30.5 G/DL (ref 32.3–36.5)
MCHC RBC AUTO-ENTMCNC: 30.5 G/DL (ref 32.3–36.5)
MCHC RBC AUTO-ENTMCNC: 30.6 G/DL (ref 32.3–36.5)
MCHC RBC AUTO-ENTMCNC: 30.7 G/DL (ref 32.3–36.5)
MCHC RBC AUTO-ENTMCNC: 31 G/DL (ref 32.3–36.5)
MCHC RBC AUTO-ENTMCNC: 31 G/DL (ref 32.3–36.5)
MCHC RBC AUTO-ENTMCNC: 31.4 G/DL (ref 32.3–36.5)
MCHC RBC AUTO-ENTMCNC: 31.4 G/DL (ref 32.3–36.5)
MCHC RBC AUTO-ENTMCNC: 31.7 G/DL (ref 32.3–36.5)
MCHC RBC AUTO-ENTMCNC: 31.8 G/DL (ref 32.3–36.5)
MCHC RBC AUTO-ENTMCNC: 32 G/DL (ref 32.3–36.5)
MCHC RBC AUTO-ENTMCNC: 32.2 G/DL (ref 32.3–36.5)
MCV RBC AUTO: 85.3 FL (ref 81.4–97.8)
MCV RBC AUTO: 85.6 FL (ref 81.4–97.8)
MCV RBC AUTO: 86.4 FL (ref 81.4–97.8)
MCV RBC AUTO: 86.4 FL (ref 81.4–97.8)
MCV RBC AUTO: 86.5 FL (ref 81.4–97.8)
MCV RBC AUTO: 86.7 FL (ref 81.4–97.8)
MCV RBC AUTO: 86.7 FL (ref 81.4–97.8)
MCV RBC AUTO: 87.1 FL (ref 81.4–97.8)
MCV RBC AUTO: 87.2 FL (ref 81.4–97.8)
MCV RBC AUTO: 87.5 FL (ref 81.4–97.8)
MCV RBC AUTO: 87.8 FL (ref 81.4–97.8)
MCV RBC AUTO: 87.8 FL (ref 81.4–97.8)
MCV RBC AUTO: 88 FL (ref 81.4–97.8)
MCV RBC AUTO: 88.2 FL (ref 81.4–97.8)
MCV RBC AUTO: 88.3 FL (ref 81.4–97.8)
MCV RBC AUTO: 88.8 FL (ref 81.4–97.8)
MCV RBC AUTO: 88.9 FL (ref 81.4–97.8)
MCV RBC AUTO: 89 FL (ref 81.4–97.8)
MCV RBC AUTO: 89.8 FL (ref 81.4–97.8)
MICROCYTES BLD QL SMEAR: ABNORMAL
MONOCYTES # BLD AUTO: 0.54 K/UL (ref 0–0.85)
MONOCYTES # BLD AUTO: 0.6 K/UL (ref 0–0.85)
MONOCYTES # BLD AUTO: 0.72 K/UL (ref 0–0.85)
MONOCYTES # BLD AUTO: 0.75 K/UL (ref 0–0.85)
MONOCYTES # BLD AUTO: 0.75 K/UL (ref 0–0.85)
MONOCYTES # BLD AUTO: 0.76 K/UL (ref 0–0.85)
MONOCYTES # BLD AUTO: 0.76 K/UL (ref 0–0.85)
MONOCYTES # BLD AUTO: 0.87 K/UL (ref 0–0.85)
MONOCYTES NFR BLD AUTO: 3.9 % (ref 0–13.4)
MONOCYTES NFR BLD AUTO: 7 % (ref 0–13.4)
MONOCYTES NFR BLD AUTO: 7.2 % (ref 0–13.4)
MONOCYTES NFR BLD AUTO: 7.6 % (ref 0–13.4)
MONOCYTES NFR BLD AUTO: 7.9 % (ref 0–13.4)
MONOCYTES NFR BLD AUTO: 8 % (ref 0–13.4)
MONOCYTES NFR BLD AUTO: 8.3 % (ref 0–13.4)
MONOCYTES NFR BLD AUTO: 8.8 % (ref 0–13.4)
MORPHOLOGY BLD-IMP: NORMAL
MORPHOLOGY BLD-IMP: NORMAL
NEUTROPHILS # BLD AUTO: 13.82 K/UL (ref 1.82–7.42)
NEUTROPHILS # BLD AUTO: 5.33 K/UL (ref 1.82–7.42)
NEUTROPHILS # BLD AUTO: 6.89 K/UL (ref 1.82–7.42)
NEUTROPHILS # BLD AUTO: 7.39 K/UL (ref 1.82–7.42)
NEUTROPHILS # BLD AUTO: 7.55 K/UL (ref 1.82–7.42)
NEUTROPHILS # BLD AUTO: 8.07 K/UL (ref 1.82–7.42)
NEUTROPHILS # BLD AUTO: 8.12 K/UL (ref 1.82–7.42)
NEUTROPHILS # BLD AUTO: 8.7 K/UL (ref 1.82–7.42)
NEUTROPHILS NFR BLD: 69.1 % (ref 44–72)
NEUTROPHILS NFR BLD: 78 % (ref 44–72)
NEUTROPHILS NFR BLD: 79.9 % (ref 44–72)
NEUTROPHILS NFR BLD: 80.4 % (ref 44–72)
NEUTROPHILS NFR BLD: 81.4 % (ref 44–72)
NEUTROPHILS NFR BLD: 81.5 % (ref 44–72)
NEUTROPHILS NFR BLD: 81.6 % (ref 44–72)
NEUTROPHILS NFR BLD: 90.9 % (ref 44–72)
NRBC # BLD AUTO: 0 K/UL
NRBC BLD-RTO: 0 /100 WBC (ref 0–0.2)
OVALOCYTES BLD QL SMEAR: NORMAL
PATHOLOGY CONSULT NOTE: NORMAL
PATHOLOGY CONSULT NOTE: NORMAL
PCO2 BLDA: 41.7 MMHG (ref 26–37)
PCO2 BLDA: 42.1 MMHG (ref 26–37)
PCO2 TEMP ADJ BLDA: 39.8 MMHG (ref 26–37)
PCO2 TEMP ADJ BLDA: 41.2 MMHG (ref 26–37)
PH BLDA: 7.45 [PH] (ref 7.4–7.5)
PH BLDA: 7.45 [PH] (ref 7.4–7.5)
PH TEMP ADJ BLDA: 7.46 [PH] (ref 7.4–7.5)
PH TEMP ADJ BLDA: 7.47 [PH] (ref 7.4–7.5)
PHOSPHATE SERPL-MCNC: 2.2 MG/DL (ref 2.5–4.5)
PHOSPHATE SERPL-MCNC: 2.2 MG/DL (ref 2.5–4.5)
PHOSPHATE SERPL-MCNC: 2.3 MG/DL (ref 2.5–4.5)
PHOSPHATE SERPL-MCNC: 2.4 MG/DL (ref 2.5–4.5)
PHOSPHATE SERPL-MCNC: 2.6 MG/DL (ref 2.5–4.5)
PHOSPHATE SERPL-MCNC: 2.7 MG/DL (ref 2.5–4.5)
PHOSPHATE SERPL-MCNC: 3 MG/DL (ref 2.5–4.5)
PHOSPHATE SERPL-MCNC: 3.1 MG/DL (ref 2.5–4.5)
PHOSPHATE SERPL-MCNC: 3.2 MG/DL (ref 2.5–4.5)
PHOSPHATE SERPL-MCNC: 3.3 MG/DL (ref 2.5–4.5)
PHOSPHATE SERPL-MCNC: 3.3 MG/DL (ref 2.5–4.5)
PHOSPHATE SERPL-MCNC: 4.4 MG/DL (ref 2.5–4.5)
PLATELET # BLD AUTO: 254 K/UL (ref 164–446)
PLATELET # BLD AUTO: 314 K/UL (ref 164–446)
PLATELET # BLD AUTO: 319 K/UL (ref 164–446)
PLATELET # BLD AUTO: 324 K/UL (ref 164–446)
PLATELET # BLD AUTO: 325 K/UL (ref 164–446)
PLATELET # BLD AUTO: 330 K/UL (ref 164–446)
PLATELET # BLD AUTO: 344 K/UL (ref 164–446)
PLATELET # BLD AUTO: 345 K/UL (ref 164–446)
PLATELET # BLD AUTO: 348 K/UL (ref 164–446)
PLATELET # BLD AUTO: 352 K/UL (ref 164–446)
PLATELET # BLD AUTO: 354 K/UL (ref 164–446)
PLATELET # BLD AUTO: 366 K/UL (ref 164–446)
PLATELET # BLD AUTO: 368 K/UL (ref 164–446)
PLATELET # BLD AUTO: 374 K/UL (ref 164–446)
PLATELET # BLD AUTO: 383 K/UL (ref 164–446)
PLATELET # BLD AUTO: 397 K/UL (ref 164–446)
PLATELET # BLD AUTO: 406 K/UL (ref 164–446)
PLATELET # BLD AUTO: 407 K/UL (ref 164–446)
PLATELET # BLD AUTO: 416 K/UL (ref 164–446)
PLATELET # BLD AUTO: 429 K/UL (ref 164–446)
PLATELET # BLD AUTO: 431 K/UL (ref 164–446)
PLATELET # BLD AUTO: 441 K/UL (ref 164–446)
PLATELET # BLD AUTO: 445 K/UL (ref 164–446)
PLATELET # BLD AUTO: 471 K/UL (ref 164–446)
PLATELET BLD QL SMEAR: NORMAL
PLATELET BLD QL SMEAR: NORMAL
PMV BLD AUTO: 10 FL (ref 9–12.9)
PMV BLD AUTO: 10.1 FL (ref 9–12.9)
PMV BLD AUTO: 10.1 FL (ref 9–12.9)
PMV BLD AUTO: 10.2 FL (ref 9–12.9)
PMV BLD AUTO: 10.3 FL (ref 9–12.9)
PMV BLD AUTO: 10.7 FL (ref 9–12.9)
PMV BLD AUTO: 8.9 FL (ref 9–12.9)
PMV BLD AUTO: 9.2 FL (ref 9–12.9)
PMV BLD AUTO: 9.4 FL (ref 9–12.9)
PMV BLD AUTO: 9.5 FL (ref 9–12.9)
PMV BLD AUTO: 9.6 FL (ref 9–12.9)
PMV BLD AUTO: 9.7 FL (ref 9–12.9)
PMV BLD AUTO: 9.7 FL (ref 9–12.9)
PMV BLD AUTO: 9.8 FL (ref 9–12.9)
PO2 BLDA: 296 MMHG (ref 64–87)
PO2 BLDA: 321 MMHG (ref 64–87)
PO2 TEMP ADJ BLDA: 294 MMHG (ref 64–87)
PO2 TEMP ADJ BLDA: 315 MMHG (ref 64–87)
POIKILOCYTOSIS BLD QL SMEAR: NORMAL
POTASSIUM BLD-SCNC: 4.3 MMOL/L (ref 3.6–5.5)
POTASSIUM BLD-SCNC: 4.5 MMOL/L (ref 3.6–5.5)
POTASSIUM SERPL-SCNC: 3.5 MMOL/L (ref 3.6–5.5)
POTASSIUM SERPL-SCNC: 3.9 MMOL/L (ref 3.6–5.5)
POTASSIUM SERPL-SCNC: 4 MMOL/L (ref 3.6–5.5)
POTASSIUM SERPL-SCNC: 4 MMOL/L (ref 3.6–5.5)
POTASSIUM SERPL-SCNC: 4.1 MMOL/L (ref 3.6–5.5)
POTASSIUM SERPL-SCNC: 4.2 MMOL/L (ref 3.6–5.5)
POTASSIUM SERPL-SCNC: 4.3 MMOL/L (ref 3.6–5.5)
POTASSIUM SERPL-SCNC: 4.4 MMOL/L (ref 3.6–5.5)
POTASSIUM SERPL-SCNC: 4.5 MMOL/L (ref 3.6–5.5)
POTASSIUM SERPL-SCNC: 4.6 MMOL/L (ref 3.6–5.5)
POTASSIUM SERPL-SCNC: 4.7 MMOL/L (ref 3.6–5.5)
POTASSIUM SERPL-SCNC: 4.9 MMOL/L (ref 3.6–5.5)
POTASSIUM SERPL-SCNC: 4.9 MMOL/L (ref 3.6–5.5)
POTASSIUM SERPL-SCNC: 5.1 MMOL/L (ref 3.6–5.5)
POTASSIUM SERPL-SCNC: 5.2 MMOL/L (ref 3.6–5.5)
PROCALCITONIN SERPL-MCNC: 0.16 NG/ML
PROCALCITONIN SERPL-MCNC: 0.45 NG/ML
PRODUCT TYPE UPROD: NORMAL
PROT SERPL-MCNC: 5.1 G/DL (ref 6–8.2)
PROT SERPL-MCNC: 5.2 G/DL (ref 6–8.2)
PROT SERPL-MCNC: 5.3 G/DL (ref 6–8.2)
PROT SERPL-MCNC: 5.6 G/DL (ref 6–8.2)
PROT SERPL-MCNC: 5.6 G/DL (ref 6–8.2)
PROT SERPL-MCNC: 5.7 G/DL (ref 6–8.2)
PROT SERPL-MCNC: 5.9 G/DL (ref 6–8.2)
PROT SERPL-MCNC: 6.1 G/DL (ref 6–8.2)
PROT SERPL-MCNC: 6.2 G/DL (ref 6–8.2)
PROT SERPL-MCNC: 6.3 G/DL (ref 6–8.2)
PROT SERPL-MCNC: 6.4 G/DL (ref 6–8.2)
PROT SERPL-MCNC: 6.5 G/DL (ref 6–8.2)
PROT SERPL-MCNC: 6.6 G/DL (ref 6–8.2)
PROT SERPL-MCNC: 6.6 G/DL (ref 6–8.2)
PROT SERPL-MCNC: 7.2 G/DL (ref 6–8.2)
PROTHROMBIN TIME: 13.7 SEC (ref 12–14.6)
PROTHROMBIN TIME: 14 SEC (ref 12–14.6)
PROTHROMBIN TIME: 14.5 SEC (ref 12–14.6)
PROTHROMBIN TIME: 14.8 SEC (ref 12–14.6)
PROTHROMBIN TIME: 16.3 SEC (ref 12–14.6)
RBC # BLD AUTO: 2.51 M/UL (ref 4.7–6.1)
RBC # BLD AUTO: 2.63 M/UL (ref 4.7–6.1)
RBC # BLD AUTO: 2.65 M/UL (ref 4.7–6.1)
RBC # BLD AUTO: 2.74 M/UL (ref 4.7–6.1)
RBC # BLD AUTO: 2.75 M/UL (ref 4.7–6.1)
RBC # BLD AUTO: 2.79 M/UL (ref 4.7–6.1)
RBC # BLD AUTO: 2.8 M/UL (ref 4.7–6.1)
RBC # BLD AUTO: 2.86 M/UL (ref 4.7–6.1)
RBC # BLD AUTO: 2.88 M/UL (ref 4.7–6.1)
RBC # BLD AUTO: 2.89 M/UL (ref 4.7–6.1)
RBC # BLD AUTO: 2.89 M/UL (ref 4.7–6.1)
RBC # BLD AUTO: 2.94 M/UL (ref 4.7–6.1)
RBC # BLD AUTO: 2.99 M/UL (ref 4.7–6.1)
RBC # BLD AUTO: 3.7 M/UL (ref 4.7–6.1)
RBC # BLD AUTO: 3.7 M/UL (ref 4.7–6.1)
RBC # BLD AUTO: 3.71 M/UL (ref 4.7–6.1)
RBC # BLD AUTO: 3.74 M/UL (ref 4.7–6.1)
RBC # BLD AUTO: 3.88 M/UL (ref 4.7–6.1)
RBC # BLD AUTO: 3.9 M/UL (ref 4.7–6.1)
RBC # BLD AUTO: 3.98 M/UL (ref 4.7–6.1)
RBC # BLD AUTO: 4.06 M/UL (ref 4.7–6.1)
RBC # BLD AUTO: 4.18 M/UL (ref 4.7–6.1)
RBC BLD AUTO: NORMAL
RBC BLD AUTO: PRESENT
RETICS # AUTO: 0.07 M/UL (ref 0.04–0.12)
RETICS/RBC NFR: 2.4 % (ref 0.8–2.6)
RH BLD: NORMAL
RH BLD: NORMAL
SAO2 % BLDA: 100 % (ref 93–99)
SAO2 % BLDA: 100 % (ref 93–99)
SIGNIFICANT IND 70042: NORMAL
SITE SITE: NORMAL
SODIUM BLD-SCNC: 134 MMOL/L (ref 135–145)
SODIUM BLD-SCNC: 135 MMOL/L (ref 135–145)
SODIUM SERPL-SCNC: 130 MMOL/L (ref 135–145)
SODIUM SERPL-SCNC: 131 MMOL/L (ref 135–145)
SODIUM SERPL-SCNC: 131 MMOL/L (ref 135–145)
SODIUM SERPL-SCNC: 132 MMOL/L (ref 135–145)
SODIUM SERPL-SCNC: 133 MMOL/L (ref 135–145)
SODIUM SERPL-SCNC: 134 MMOL/L (ref 135–145)
SODIUM SERPL-SCNC: 135 MMOL/L (ref 135–145)
SODIUM SERPL-SCNC: 135 MMOL/L (ref 135–145)
SODIUM SERPL-SCNC: 136 MMOL/L (ref 135–145)
SODIUM SERPL-SCNC: 137 MMOL/L (ref 135–145)
SODIUM SERPL-SCNC: 138 MMOL/L (ref 135–145)
SODIUM SERPL-SCNC: 139 MMOL/L (ref 135–145)
SODIUM SERPL-SCNC: 139 MMOL/L (ref 135–145)
SODIUM SERPL-SCNC: 140 MMOL/L (ref 135–145)
SOURCE SOURCE: NORMAL
SPECIMEN DRAWN FROM PATIENT: ABNORMAL
SPECIMEN DRAWN FROM PATIENT: ABNORMAL
SPECIMEN SOURCE: NORMAL
SPECIMEN SOURCE: NORMAL
STOMATOCYTES BLD QL SMEAR: NORMAL
TIBC SERPL-MCNC: 144 UG/DL (ref 250–450)
TRIGL SERPL-MCNC: 122 MG/DL (ref 0–149)
TRIGL SERPL-MCNC: 92 MG/DL (ref 0–149)
TSH SERPL DL<=0.005 MIU/L-ACNC: 3.49 UIU/ML (ref 0.38–5.33)
UIBC SERPL-MCNC: 133 UG/DL (ref 110–370)
UNIT STATUS USTAT: NORMAL
VIT B12 SERPL-MCNC: 1034 PG/ML (ref 211–911)
WBC # BLD AUTO: 10 K/UL (ref 4.8–10.8)
WBC # BLD AUTO: 10 K/UL (ref 4.8–10.8)
WBC # BLD AUTO: 10.1 K/UL (ref 4.8–10.8)
WBC # BLD AUTO: 10.5 K/UL (ref 4.8–10.8)
WBC # BLD AUTO: 10.8 K/UL (ref 4.8–10.8)
WBC # BLD AUTO: 11 K/UL (ref 4.8–10.8)
WBC # BLD AUTO: 11.6 K/UL (ref 4.8–10.8)
WBC # BLD AUTO: 12.6 K/UL (ref 4.8–10.8)
WBC # BLD AUTO: 14.4 K/UL (ref 4.8–10.8)
WBC # BLD AUTO: 15.2 K/UL (ref 4.8–10.8)
WBC # BLD AUTO: 17 K/UL (ref 4.8–10.8)
WBC # BLD AUTO: 17.4 K/UL (ref 4.8–10.8)
WBC # BLD AUTO: 17.6 K/UL (ref 4.8–10.8)
WBC # BLD AUTO: 21.4 K/UL (ref 4.8–10.8)
WBC # BLD AUTO: 21.8 K/UL (ref 4.8–10.8)
WBC # BLD AUTO: 24.8 K/UL (ref 4.8–10.8)
WBC # BLD AUTO: 26 K/UL (ref 4.8–10.8)
WBC # BLD AUTO: 7.7 K/UL (ref 4.8–10.8)
WBC # BLD AUTO: 8.4 K/UL (ref 4.8–10.8)
WBC # BLD AUTO: 8.6 K/UL (ref 4.8–10.8)
WBC # BLD AUTO: 9.1 K/UL (ref 4.8–10.8)
WBC # BLD AUTO: 9.2 K/UL (ref 4.8–10.8)
WBC # BLD AUTO: 9.7 K/UL (ref 4.8–10.8)
WBC # BLD AUTO: 9.9 K/UL (ref 4.8–10.8)

## 2023-01-01 PROCEDURE — 99232 SBSQ HOSP IP/OBS MODERATE 35: CPT | Performed by: PHYSICIAN ASSISTANT

## 2023-01-01 PROCEDURE — 700111 HCHG RX REV CODE 636 W/ 250 OVERRIDE (IP): Performed by: HOSPITALIST

## 2023-01-01 PROCEDURE — 700117 HCHG RX CONTRAST REV CODE 255: Performed by: RADIOLOGY

## 2023-01-01 PROCEDURE — 82465 ASSAY BLD/SERUM CHOLESTEROL: CPT

## 2023-01-01 PROCEDURE — 99233 SBSQ HOSP IP/OBS HIGH 50: CPT | Performed by: HOSPITALIST

## 2023-01-01 PROCEDURE — 700111 HCHG RX REV CODE 636 W/ 250 OVERRIDE (IP)

## 2023-01-01 PROCEDURE — 86301 IMMUNOASSAY TUMOR CA 19-9: CPT

## 2023-01-01 PROCEDURE — 700111 HCHG RX REV CODE 636 W/ 250 OVERRIDE (IP): Mod: JZ | Performed by: RADIOLOGY

## 2023-01-01 PROCEDURE — A9270 NON-COVERED ITEM OR SERVICE: HCPCS

## 2023-01-01 PROCEDURE — 700101 HCHG RX REV CODE 250: Performed by: HOSPITALIST

## 2023-01-01 PROCEDURE — 51798 US URINE CAPACITY MEASURE: CPT

## 2023-01-01 PROCEDURE — 700105 HCHG RX REV CODE 258: Performed by: INTERNAL MEDICINE

## 2023-01-01 PROCEDURE — 84100 ASSAY OF PHOSPHORUS: CPT

## 2023-01-01 PROCEDURE — 80048 BASIC METABOLIC PNL TOTAL CA: CPT

## 2023-01-01 PROCEDURE — 700101 HCHG RX REV CODE 250: Performed by: UROLOGY

## 2023-01-01 PROCEDURE — 83735 ASSAY OF MAGNESIUM: CPT

## 2023-01-01 PROCEDURE — 85014 HEMATOCRIT: CPT

## 2023-01-01 PROCEDURE — A9270 NON-COVERED ITEM OR SERVICE: HCPCS | Performed by: FAMILY MEDICINE

## 2023-01-01 PROCEDURE — 86901 BLOOD TYPING SEROLOGIC RH(D): CPT

## 2023-01-01 PROCEDURE — 700111 HCHG RX REV CODE 636 W/ 250 OVERRIDE (IP): Performed by: FAMILY MEDICINE

## 2023-01-01 PROCEDURE — 85610 PROTHROMBIN TIME: CPT

## 2023-01-01 PROCEDURE — 99232 SBSQ HOSP IP/OBS MODERATE 35: CPT | Mod: GC | Performed by: INTERNAL MEDICINE

## 2023-01-01 PROCEDURE — 700111 HCHG RX REV CODE 636 W/ 250 OVERRIDE (IP): Performed by: STUDENT IN AN ORGANIZED HEALTH CARE EDUCATION/TRAINING PROGRAM

## 2023-01-01 PROCEDURE — 700105 HCHG RX REV CODE 258: Performed by: FAMILY MEDICINE

## 2023-01-01 PROCEDURE — 700101 HCHG RX REV CODE 250

## 2023-01-01 PROCEDURE — 88309 TISSUE EXAM BY PATHOLOGIST: CPT

## 2023-01-01 PROCEDURE — 99231 SBSQ HOSP IP/OBS SF/LOW 25: CPT

## 2023-01-01 PROCEDURE — 770001 HCHG ROOM/CARE - MED/SURG/GYN PRIV*

## 2023-01-01 PROCEDURE — 85027 COMPLETE CBC AUTOMATED: CPT

## 2023-01-01 PROCEDURE — 700105 HCHG RX REV CODE 258: Mod: JZ | Performed by: ANESTHESIOLOGY

## 2023-01-01 PROCEDURE — 99233 SBSQ HOSP IP/OBS HIGH 50: CPT | Performed by: FAMILY MEDICINE

## 2023-01-01 PROCEDURE — 99231 SBSQ HOSP IP/OBS SF/LOW 25: CPT | Performed by: NURSE PRACTITIONER

## 2023-01-01 PROCEDURE — 160041 HCHG SURGERY MINUTES - EA ADDL 1 MIN LEVEL 4: Performed by: UROLOGY

## 2023-01-01 PROCEDURE — 36415 COLL VENOUS BLD VENIPUNCTURE: CPT

## 2023-01-01 PROCEDURE — 700101 HCHG RX REV CODE 250: Performed by: FAMILY MEDICINE

## 2023-01-01 PROCEDURE — 82140 ASSAY OF AMMONIA: CPT

## 2023-01-01 PROCEDURE — 80053 COMPREHEN METABOLIC PANEL: CPT

## 2023-01-01 PROCEDURE — 700111 HCHG RX REV CODE 636 W/ 250 OVERRIDE (IP): Mod: JZ | Performed by: NURSE PRACTITIONER

## 2023-01-01 PROCEDURE — 160036 HCHG PACU - EA ADDL 30 MINS PHASE I

## 2023-01-01 PROCEDURE — C9113 INJ PANTOPRAZOLE SODIUM, VIA: HCPCS | Performed by: HOSPITALIST

## 2023-01-01 PROCEDURE — 160029 HCHG SURGERY MINUTES - 1ST 30 MINS LEVEL 4: Performed by: UROLOGY

## 2023-01-01 PROCEDURE — 700111 HCHG RX REV CODE 636 W/ 250 OVERRIDE (IP): Mod: JZ | Performed by: STUDENT IN AN ORGANIZED HEALTH CARE EDUCATION/TRAINING PROGRAM

## 2023-01-01 PROCEDURE — 93010 ELECTROCARDIOGRAM REPORT: CPT | Performed by: INTERNAL MEDICINE

## 2023-01-01 PROCEDURE — 83690 ASSAY OF LIPASE: CPT

## 2023-01-01 PROCEDURE — 700105 HCHG RX REV CODE 258: Performed by: HOSPITALIST

## 2023-01-01 PROCEDURE — 85018 HEMOGLOBIN: CPT

## 2023-01-01 PROCEDURE — 77012 CT SCAN FOR NEEDLE BIOPSY: CPT

## 2023-01-01 PROCEDURE — 160035 HCHG PACU - 1ST 60 MINS PHASE I

## 2023-01-01 PROCEDURE — 99232 SBSQ HOSP IP/OBS MODERATE 35: CPT | Performed by: SURGERY

## 2023-01-01 PROCEDURE — A9270 NON-COVERED ITEM OR SERVICE: HCPCS | Performed by: HOSPITALIST

## 2023-01-01 PROCEDURE — C9113 INJ PANTOPRAZOLE SODIUM, VIA: HCPCS

## 2023-01-01 PROCEDURE — 700101 HCHG RX REV CODE 250: Performed by: SURGERY

## 2023-01-01 PROCEDURE — 32408 CORE NDL BX LNG/MED PERQ: CPT | Mod: RT

## 2023-01-01 PROCEDURE — 700105 HCHG RX REV CODE 258: Performed by: STUDENT IN AN ORGANIZED HEALTH CARE EDUCATION/TRAINING PROGRAM

## 2023-01-01 PROCEDURE — 700111 HCHG RX REV CODE 636 W/ 250 OVERRIDE (IP): Performed by: INTERNAL MEDICINE

## 2023-01-01 PROCEDURE — 99222 1ST HOSP IP/OBS MODERATE 55: CPT | Performed by: NURSE PRACTITIONER

## 2023-01-01 PROCEDURE — 700102 HCHG RX REV CODE 250 W/ 637 OVERRIDE(OP): Performed by: FAMILY MEDICINE

## 2023-01-01 PROCEDURE — 700102 HCHG RX REV CODE 250 W/ 637 OVERRIDE(OP): Performed by: HOSPITALIST

## 2023-01-01 PROCEDURE — 88341 IMHCHEM/IMCYTCHM EA ADD ANTB: CPT | Mod: 91

## 2023-01-01 PROCEDURE — 99232 SBSQ HOSP IP/OBS MODERATE 35: CPT | Performed by: NURSE PRACTITIONER

## 2023-01-01 PROCEDURE — 700102 HCHG RX REV CODE 250 W/ 637 OVERRIDE(OP): Performed by: PHYSICIAN ASSISTANT

## 2023-01-01 PROCEDURE — 770006 HCHG ROOM/CARE - MED/SURG/GYN SEMI*

## 2023-01-01 PROCEDURE — 83605 ASSAY OF LACTIC ACID: CPT | Mod: 91

## 2023-01-01 PROCEDURE — 99233 SBSQ HOSP IP/OBS HIGH 50: CPT | Mod: GC | Performed by: INTERNAL MEDICINE

## 2023-01-01 PROCEDURE — 71046 X-RAY EXAM CHEST 2 VIEWS: CPT

## 2023-01-01 PROCEDURE — 700102 HCHG RX REV CODE 250 W/ 637 OVERRIDE(OP)

## 2023-01-01 PROCEDURE — 85025 COMPLETE CBC W/AUTO DIFF WBC: CPT

## 2023-01-01 PROCEDURE — 700105 HCHG RX REV CODE 258: Mod: JZ | Performed by: UROLOGY

## 2023-01-01 PROCEDURE — 700102 HCHG RX REV CODE 250 W/ 637 OVERRIDE(OP): Performed by: UROLOGY

## 2023-01-01 PROCEDURE — 160046 HCHG PACU - 1ST 60 MINS PHASE II

## 2023-01-01 PROCEDURE — 97535 SELF CARE MNGMENT TRAINING: CPT

## 2023-01-01 PROCEDURE — 88307 TISSUE EXAM BY PATHOLOGIST: CPT | Mod: 59

## 2023-01-01 PROCEDURE — 700111 HCHG RX REV CODE 636 W/ 250 OVERRIDE (IP): Mod: JZ

## 2023-01-01 PROCEDURE — RXMED WILLOW AMBULATORY MEDICATION CHARGE: Performed by: INTERNAL MEDICINE

## 2023-01-01 PROCEDURE — 80069 RENAL FUNCTION PANEL: CPT

## 2023-01-01 PROCEDURE — 700111 HCHG RX REV CODE 636 W/ 250 OVERRIDE (IP): Mod: JZ | Performed by: HOSPITALIST

## 2023-01-01 PROCEDURE — 0TT10ZZ RESECTION OF LEFT KIDNEY, OPEN APPROACH: ICD-10-PCS | Performed by: UROLOGY

## 2023-01-01 PROCEDURE — 82330 ASSAY OF CALCIUM: CPT

## 2023-01-01 PROCEDURE — A9270 NON-COVERED ITEM OR SERVICE: HCPCS | Performed by: UROLOGY

## 2023-01-01 PROCEDURE — 700105 HCHG RX REV CODE 258

## 2023-01-01 PROCEDURE — 82962 GLUCOSE BLOOD TEST: CPT | Mod: 91

## 2023-01-01 PROCEDURE — 93005 ELECTROCARDIOGRAM TRACING: CPT

## 2023-01-01 PROCEDURE — 97530 THERAPEUTIC ACTIVITIES: CPT

## 2023-01-01 PROCEDURE — P9016 RBC LEUKOCYTES REDUCED: HCPCS

## 2023-01-01 PROCEDURE — 48100 BIOPSY OF PANCREAS OPEN: CPT | Performed by: SURGERY

## 2023-01-01 PROCEDURE — 160036 HCHG PACU - EA ADDL 30 MINS PHASE I: Performed by: UROLOGY

## 2023-01-01 PROCEDURE — 700105 HCHG RX REV CODE 258: Performed by: ANESTHESIOLOGY

## 2023-01-01 PROCEDURE — 99239 HOSP IP/OBS DSCHRG MGMT >30: CPT | Mod: GC | Performed by: INTERNAL MEDICINE

## 2023-01-01 PROCEDURE — 700111 HCHG RX REV CODE 636 W/ 250 OVERRIDE (IP): Performed by: ANESTHESIOLOGY

## 2023-01-01 PROCEDURE — 86900 BLOOD TYPING SEROLOGIC ABO: CPT

## 2023-01-01 PROCEDURE — A9270 NON-COVERED ITEM OR SERVICE: HCPCS | Performed by: ANESTHESIOLOGY

## 2023-01-01 PROCEDURE — 99232 SBSQ HOSP IP/OBS MODERATE 35: CPT | Performed by: HOSPITALIST

## 2023-01-01 PROCEDURE — 02HV33Z INSERTION OF INFUSION DEVICE INTO SUPERIOR VENA CAVA, PERCUTANEOUS APPROACH: ICD-10-PCS

## 2023-01-01 PROCEDURE — 74177 CT ABD & PELVIS W/CONTRAST: CPT

## 2023-01-01 PROCEDURE — 82962 GLUCOSE BLOOD TEST: CPT

## 2023-01-01 PROCEDURE — 97166 OT EVAL MOD COMPLEX 45 MIN: CPT

## 2023-01-01 PROCEDURE — 83605 ASSAY OF LACTIC ACID: CPT

## 2023-01-01 PROCEDURE — 36430 TRANSFUSION BLD/BLD COMPNT: CPT

## 2023-01-01 PROCEDURE — 99232 SBSQ HOSP IP/OBS MODERATE 35: CPT | Performed by: FAMILY MEDICINE

## 2023-01-01 PROCEDURE — 700111 HCHG RX REV CODE 636 W/ 250 OVERRIDE (IP): Mod: JZ | Performed by: UROLOGY

## 2023-01-01 PROCEDURE — 8E0ZXY6 ISOLATION: ICD-10-PCS | Performed by: HOSPITALIST

## 2023-01-01 PROCEDURE — 87070 CULTURE OTHR SPECIMN AEROBIC: CPT

## 2023-01-01 PROCEDURE — 700111 HCHG RX REV CODE 636 W/ 250 OVERRIDE (IP): Mod: JZ | Performed by: ANESTHESIOLOGY

## 2023-01-01 PROCEDURE — 700105 HCHG RX REV CODE 258: Performed by: UROLOGY

## 2023-01-01 PROCEDURE — C1751 CATH, INF, PER/CENT/MIDLINE: HCPCS

## 2023-01-01 PROCEDURE — 83540 ASSAY OF IRON: CPT

## 2023-01-01 PROCEDURE — 88342 IMHCHEM/IMCYTCHM 1ST ANTB: CPT

## 2023-01-01 PROCEDURE — 86850 RBC ANTIBODY SCREEN: CPT

## 2023-01-01 PROCEDURE — 99223 1ST HOSP IP/OBS HIGH 75: CPT | Mod: AI | Performed by: HOSPITALIST

## 2023-01-01 PROCEDURE — 700101 HCHG RX REV CODE 250: Performed by: INTERNAL MEDICINE

## 2023-01-01 PROCEDURE — 110371 HCHG SHELL REV 272: Performed by: UROLOGY

## 2023-01-01 PROCEDURE — 86923 COMPATIBILITY TEST ELECTRIC: CPT

## 2023-01-01 PROCEDURE — 74176 CT ABD & PELVIS W/O CONTRAST: CPT

## 2023-01-01 PROCEDURE — 82803 BLOOD GASES ANY COMBINATION: CPT

## 2023-01-01 PROCEDURE — 99222 1ST HOSP IP/OBS MODERATE 55: CPT | Performed by: SURGERY

## 2023-01-01 PROCEDURE — 700111 HCHG RX REV CODE 636 W/ 250 OVERRIDE (IP): Performed by: RADIOLOGY

## 2023-01-01 PROCEDURE — 700105 HCHG RX REV CODE 258: Performed by: RADIOLOGY

## 2023-01-01 PROCEDURE — 62320 NJX INTERLAMINAR CRV/THRC: CPT | Performed by: UROLOGY

## 2023-01-01 PROCEDURE — 88331 PATH CONSLTJ SURG 1 BLK 1SPC: CPT

## 2023-01-01 PROCEDURE — 97116 GAIT TRAINING THERAPY: CPT

## 2023-01-01 PROCEDURE — 83550 IRON BINDING TEST: CPT

## 2023-01-01 PROCEDURE — 99233 SBSQ HOSP IP/OBS HIGH 50: CPT | Performed by: STUDENT IN AN ORGANIZED HEALTH CARE EDUCATION/TRAINING PROGRAM

## 2023-01-01 PROCEDURE — 700102 HCHG RX REV CODE 250 W/ 637 OVERRIDE(OP): Performed by: INTERNAL MEDICINE

## 2023-01-01 PROCEDURE — 97602 WOUND(S) CARE NON-SELECTIVE: CPT

## 2023-01-01 PROCEDURE — 700117 HCHG RX CONTRAST REV CODE 255: Performed by: NURSE PRACTITIONER

## 2023-01-01 PROCEDURE — 0FBG0ZX EXCISION OF PANCREAS, OPEN APPROACH, DIAGNOSTIC: ICD-10-PCS | Performed by: SURGERY

## 2023-01-01 PROCEDURE — 99024 POSTOP FOLLOW-UP VISIT: CPT | Performed by: SPECIALIST

## 2023-01-01 PROCEDURE — 84145 PROCALCITONIN (PCT): CPT

## 2023-01-01 PROCEDURE — 88305 TISSUE EXAM BY PATHOLOGIST: CPT

## 2023-01-01 PROCEDURE — A9270 NON-COVERED ITEM OR SERVICE: HCPCS | Performed by: PHYSICIAN ASSISTANT

## 2023-01-01 PROCEDURE — 3E0436Z INTRODUCTION OF NUTRITIONAL SUBSTANCE INTO CENTRAL VEIN, PERCUTANEOUS APPROACH: ICD-10-PCS

## 2023-01-01 PROCEDURE — 83036 HEMOGLOBIN GLYCOSYLATED A1C: CPT

## 2023-01-01 PROCEDURE — 88341 IMHCHEM/IMCYTCHM EA ADD ANTB: CPT

## 2023-01-01 PROCEDURE — 85046 RETICYTE/HGB CONCENTRATE: CPT

## 2023-01-01 PROCEDURE — 87075 CULTR BACTERIA EXCEPT BLOOD: CPT

## 2023-01-01 PROCEDURE — 71045 X-RAY EXAM CHEST 1 VIEW: CPT

## 2023-01-01 PROCEDURE — 84295 ASSAY OF SERUM SODIUM: CPT

## 2023-01-01 PROCEDURE — 74018 RADEX ABDOMEN 1 VIEW: CPT

## 2023-01-01 PROCEDURE — 700117 HCHG RX CONTRAST REV CODE 255: Performed by: FAMILY MEDICINE

## 2023-01-01 PROCEDURE — 87040 BLOOD CULTURE FOR BACTERIA: CPT

## 2023-01-01 PROCEDURE — 88313 SPECIAL STAINS GROUP 2: CPT

## 2023-01-01 PROCEDURE — A9270 NON-COVERED ITEM OR SERVICE: HCPCS | Performed by: NURSE PRACTITIONER

## 2023-01-01 PROCEDURE — 160002 HCHG RECOVERY MINUTES (STAT)

## 2023-01-01 PROCEDURE — 87086 URINE CULTURE/COLONY COUNT: CPT

## 2023-01-01 PROCEDURE — 02HV33Z INSERTION OF INFUSION DEVICE INTO SUPERIOR VENA CAVA, PERCUTANEOUS APPROACH: ICD-10-PCS | Performed by: HOSPITALIST

## 2023-01-01 PROCEDURE — 99152 MOD SED SAME PHYS/QHP 5/>YRS: CPT

## 2023-01-01 PROCEDURE — 84478 ASSAY OF TRIGLYCERIDES: CPT

## 2023-01-01 PROCEDURE — 93005 ELECTROCARDIOGRAM TRACING: CPT | Performed by: UROLOGY

## 2023-01-01 PROCEDURE — 87205 SMEAR GRAM STAIN: CPT

## 2023-01-01 PROCEDURE — 97162 PT EVAL MOD COMPLEX 30 MIN: CPT

## 2023-01-01 PROCEDURE — 160048 HCHG OR STATISTICAL LEVEL 1-5: Performed by: UROLOGY

## 2023-01-01 PROCEDURE — 99024 POSTOP FOLLOW-UP VISIT: CPT | Performed by: SURGERY

## 2023-01-01 PROCEDURE — 700111 HCHG RX REV CODE 636 W/ 250 OVERRIDE (IP): Mod: JZ | Performed by: PHYSICIAN ASSISTANT

## 2023-01-01 PROCEDURE — 160035 HCHG PACU - 1ST 60 MINS PHASE I: Performed by: UROLOGY

## 2023-01-01 PROCEDURE — 160002 HCHG RECOVERY MINUTES (STAT): Performed by: UROLOGY

## 2023-01-01 PROCEDURE — 76998 US GUIDE INTRAOP: CPT | Mod: 26 | Performed by: SURGERY

## 2023-01-01 PROCEDURE — 99497 ADVNCD CARE PLAN 30 MIN: CPT | Performed by: NURSE PRACTITIONER

## 2023-01-01 PROCEDURE — 85730 THROMBOPLASTIN TIME PARTIAL: CPT

## 2023-01-01 PROCEDURE — 4410509 CT-BIOPSY-LUNG/MEDIASTINUM W/ GUIDE

## 2023-01-01 PROCEDURE — 99233 SBSQ HOSP IP/OBS HIGH 50: CPT | Mod: GC | Performed by: HOSPITALIST

## 2023-01-01 PROCEDURE — 0DH63UZ INSERTION OF FEEDING DEVICE INTO STOMACH, PERCUTANEOUS APPROACH: ICD-10-PCS | Performed by: RADIOLOGY

## 2023-01-01 PROCEDURE — 30243N1 TRANSFUSION OF NONAUTOLOGOUS RED BLOOD CELLS INTO CENTRAL VEIN, PERCUTANEOUS APPROACH: ICD-10-PCS

## 2023-01-01 PROCEDURE — 99223 1ST HOSP IP/OBS HIGH 75: CPT | Performed by: STUDENT IN AN ORGANIZED HEALTH CARE EDUCATION/TRAINING PROGRAM

## 2023-01-01 PROCEDURE — C1751 CATH, INF, PER/CENT/MIDLINE: HCPCS | Performed by: UROLOGY

## 2023-01-01 PROCEDURE — 700101 HCHG RX REV CODE 250: Performed by: ANESTHESIOLOGY

## 2023-01-01 PROCEDURE — 82607 VITAMIN B-12: CPT

## 2023-01-01 PROCEDURE — 0W9H30Z DRAINAGE OF RETROPERITONEUM WITH DRAINAGE DEVICE, PERCUTANEOUS APPROACH: ICD-10-PCS | Performed by: RADIOLOGY

## 2023-01-01 PROCEDURE — 84443 ASSAY THYROID STIM HORMONE: CPT

## 2023-01-01 PROCEDURE — 99418 PROLNG IP/OBS E/M EA 15 MIN: CPT | Performed by: HOSPITALIST

## 2023-01-01 PROCEDURE — 700102 HCHG RX REV CODE 250 W/ 637 OVERRIDE(OP): Performed by: ANESTHESIOLOGY

## 2023-01-01 PROCEDURE — 82150 ASSAY OF AMYLASE: CPT

## 2023-01-01 PROCEDURE — 84132 ASSAY OF SERUM POTASSIUM: CPT

## 2023-01-01 PROCEDURE — 99232 SBSQ HOSP IP/OBS MODERATE 35: CPT | Mod: 25 | Performed by: NURSE PRACTITIONER

## 2023-01-01 PROCEDURE — 160009 HCHG ANES TIME/MIN: Performed by: UROLOGY

## 2023-01-01 PROCEDURE — 700102 HCHG RX REV CODE 250 W/ 637 OVERRIDE(OP): Performed by: NURSE PRACTITIONER

## 2023-01-01 PROCEDURE — 74250 X-RAY XM SM INT 1CNTRST STD: CPT

## 2023-01-01 RX ORDER — OXYCODONE HCL 10 MG/1
10 TABLET, FILM COATED, EXTENDED RELEASE ORAL ONCE
Status: COMPLETED | OUTPATIENT
Start: 2023-01-01 | End: 2023-01-01

## 2023-01-01 RX ORDER — DOCUSATE SODIUM 100 MG/1
100 CAPSULE, LIQUID FILLED ORAL EVERY 12 HOURS
Status: DISCONTINUED | OUTPATIENT
Start: 2023-01-01 | End: 2023-01-01

## 2023-01-01 RX ORDER — OXYCODONE HCL 5 MG/5 ML
5 SOLUTION, ORAL ORAL
Status: COMPLETED | OUTPATIENT
Start: 2023-01-01 | End: 2023-01-01

## 2023-01-01 RX ORDER — HEPARIN SODIUM 5000 [USP'U]/ML
5000 INJECTION, SOLUTION INTRAVENOUS; SUBCUTANEOUS EVERY 12 HOURS
Status: DISCONTINUED | OUTPATIENT
Start: 2023-01-01 | End: 2023-01-01 | Stop reason: HOSPADM

## 2023-01-01 RX ORDER — OXYCODONE HYDROCHLORIDE 5 MG/1
5 TABLET ORAL
Status: CANCELLED | OUTPATIENT
Start: 2023-01-01

## 2023-01-01 RX ORDER — BISACODYL 10 MG
10 SUPPOSITORY, RECTAL RECTAL
Status: DISCONTINUED | OUTPATIENT
Start: 2023-01-01 | End: 2023-01-01

## 2023-01-01 RX ORDER — ACETAMINOPHEN 325 MG/1
650 TABLET ORAL EVERY 4 HOURS PRN
Status: ON HOLD | COMMUNITY
Start: 2023-01-01 | End: 2023-01-01

## 2023-01-01 RX ORDER — MEGESTROL ACETATE 40 MG/ML
800 SUSPENSION ORAL
COMMUNITY

## 2023-01-01 RX ORDER — DEXTROSE MONOHYDRATE 100 MG/ML
INJECTION, SOLUTION INTRAVENOUS PRN
Status: ACTIVE | OUTPATIENT
Start: 2023-01-01 | End: 2023-01-01

## 2023-01-01 RX ORDER — HYDROMORPHONE HYDROCHLORIDE 1 MG/ML
0.5 INJECTION, SOLUTION INTRAMUSCULAR; INTRAVENOUS; SUBCUTANEOUS
Status: DISCONTINUED | OUTPATIENT
Start: 2023-01-01 | End: 2023-01-01

## 2023-01-01 RX ORDER — ONDANSETRON 2 MG/ML
4 INJECTION INTRAMUSCULAR; INTRAVENOUS EVERY 4 HOURS PRN
Status: DISCONTINUED | OUTPATIENT
Start: 2023-01-01 | End: 2023-01-01

## 2023-01-01 RX ORDER — OXYCODONE HYDROCHLORIDE 15 MG/1
15 TABLET, FILM COATED, EXTENDED RELEASE ORAL EVERY 12 HOURS
COMMUNITY

## 2023-01-01 RX ORDER — ONDANSETRON 2 MG/ML
INJECTION INTRAMUSCULAR; INTRAVENOUS PRN
Status: DISCONTINUED | OUTPATIENT
Start: 2023-01-01 | End: 2023-01-01 | Stop reason: SURG

## 2023-01-01 RX ORDER — OXYCODONE HCL 5 MG/5 ML
10 SOLUTION, ORAL ORAL
Status: COMPLETED | OUTPATIENT
Start: 2023-01-01 | End: 2023-01-01

## 2023-01-01 RX ORDER — ATROPINE SULFATE 10 MG/ML
2 SOLUTION/ DROPS OPHTHALMIC EVERY 4 HOURS PRN
Status: DISCONTINUED | OUTPATIENT
Start: 2023-01-01 | End: 2023-11-28 | Stop reason: HOSPADM

## 2023-01-01 RX ORDER — ONDANSETRON 2 MG/ML
INJECTION INTRAMUSCULAR; INTRAVENOUS
Status: COMPLETED
Start: 2023-01-01 | End: 2023-01-01

## 2023-01-01 RX ORDER — MEGESTROL ACETATE 40 MG/ML
800 SUSPENSION ORAL DAILY
Status: DISCONTINUED | OUTPATIENT
Start: 2023-01-01 | End: 2023-01-01 | Stop reason: HOSPADM

## 2023-01-01 RX ORDER — POTASSIUM CHLORIDE 7.45 MG/ML
10 INJECTION INTRAVENOUS
Status: DISCONTINUED | OUTPATIENT
Start: 2023-01-01 | End: 2023-01-01

## 2023-01-01 RX ORDER — HALOPERIDOL 5 MG/ML
1 INJECTION INTRAMUSCULAR EVERY 6 HOURS PRN
Status: DISCONTINUED | OUTPATIENT
Start: 2023-01-01 | End: 2023-01-01 | Stop reason: HOSPADM

## 2023-01-01 RX ORDER — ACETAMINOPHEN 500 MG
1000 TABLET ORAL EVERY 6 HOURS
Status: DISCONTINUED | OUTPATIENT
Start: 2023-01-01 | End: 2023-01-01

## 2023-01-01 RX ORDER — DEXTROSE MONOHYDRATE, SODIUM CHLORIDE, AND POTASSIUM CHLORIDE 50; 1.49; 4.5 G/1000ML; G/1000ML; G/1000ML
INJECTION, SOLUTION INTRAVENOUS CONTINUOUS
Status: DISCONTINUED | OUTPATIENT
Start: 2023-01-01 | End: 2023-01-01 | Stop reason: HOSPADM

## 2023-01-01 RX ORDER — MEGESTROL ACETATE 40 MG/ML
800 SUSPENSION ORAL DAILY
Qty: 600 ML | Refills: 0 | Status: SHIPPED | OUTPATIENT
Start: 2023-01-01 | End: 2023-01-01

## 2023-01-01 RX ORDER — LORAZEPAM 2 MG/ML
1 INJECTION INTRAMUSCULAR
Status: DISCONTINUED | OUTPATIENT
Start: 2023-01-01 | End: 2023-11-28 | Stop reason: HOSPADM

## 2023-01-01 RX ORDER — ENOXAPARIN SODIUM 100 MG/ML
40 INJECTION SUBCUTANEOUS DAILY
Status: DISCONTINUED | OUTPATIENT
Start: 2023-01-01 | End: 2023-01-01 | Stop reason: HOSPADM

## 2023-01-01 RX ORDER — HYDROMORPHONE HYDROCHLORIDE 1 MG/ML
0.5 INJECTION, SOLUTION INTRAMUSCULAR; INTRAVENOUS; SUBCUTANEOUS EVERY 4 HOURS PRN
Status: DISCONTINUED | OUTPATIENT
Start: 2023-01-01 | End: 2023-01-01 | Stop reason: HOSPADM

## 2023-01-01 RX ORDER — ONDANSETRON 2 MG/ML
4 INJECTION INTRAMUSCULAR; INTRAVENOUS EVERY 4 HOURS PRN
Status: DISCONTINUED | OUTPATIENT
Start: 2023-01-01 | End: 2023-01-01 | Stop reason: HOSPADM

## 2023-01-01 RX ORDER — PHENYLEPHRINE HCL IN 0.9% NACL 0.5 MG/5ML
SYRINGE (ML) INTRAVENOUS PRN
Status: DISCONTINUED | OUTPATIENT
Start: 2023-01-01 | End: 2023-01-01 | Stop reason: SURG

## 2023-01-01 RX ORDER — ONDANSETRON 4 MG/1
4 TABLET, ORALLY DISINTEGRATING ORAL EVERY 4 HOURS PRN
Status: DISCONTINUED | OUTPATIENT
Start: 2023-01-01 | End: 2023-01-01

## 2023-01-01 RX ORDER — ROCURONIUM BROMIDE 10 MG/ML
INJECTION, SOLUTION INTRAVENOUS PRN
Status: DISCONTINUED | OUTPATIENT
Start: 2023-01-01 | End: 2023-01-01 | Stop reason: SURG

## 2023-01-01 RX ORDER — LIDOCAINE HYDROCHLORIDE 10 MG/ML
INJECTION, SOLUTION INFILTRATION; PERINEURAL
Status: COMPLETED
Start: 2023-01-01 | End: 2023-01-01

## 2023-01-01 RX ORDER — HYDROMORPHONE HYDROCHLORIDE 1 MG/ML
0.25 INJECTION, SOLUTION INTRAMUSCULAR; INTRAVENOUS; SUBCUTANEOUS EVERY 4 HOURS PRN
Status: DISCONTINUED | OUTPATIENT
Start: 2023-01-01 | End: 2023-01-01

## 2023-01-01 RX ORDER — DOCUSATE SODIUM 100 MG/1
100 CAPSULE, LIQUID FILLED ORAL 2 TIMES DAILY
Status: DISCONTINUED | OUTPATIENT
Start: 2023-01-01 | End: 2023-01-01 | Stop reason: HOSPADM

## 2023-01-01 RX ORDER — FAMOTIDINE 20 MG/1
10 TABLET, FILM COATED ORAL DAILY
Status: DISCONTINUED | OUTPATIENT
Start: 2023-01-01 | End: 2023-01-01 | Stop reason: HOSPADM

## 2023-01-01 RX ORDER — ACETAMINOPHEN 325 MG/1
975 TABLET ORAL EVERY 8 HOURS PRN
Status: DISCONTINUED | OUTPATIENT
Start: 2023-01-01 | End: 2023-01-01 | Stop reason: HOSPADM

## 2023-01-01 RX ORDER — NAPROXEN SODIUM 220 MG
220 TABLET ORAL 2 TIMES DAILY PRN
COMMUNITY

## 2023-01-01 RX ORDER — HALOPERIDOL 5 MG/ML
1 INJECTION INTRAMUSCULAR EVERY 6 HOURS PRN
Status: DISCONTINUED | OUTPATIENT
Start: 2023-01-01 | End: 2023-01-01

## 2023-01-01 RX ORDER — PROMETHAZINE HYDROCHLORIDE 25 MG/1
25 SUPPOSITORY RECTAL EVERY 6 HOURS PRN
Qty: 10 SUPPOSITORY | Refills: 0 | Status: SHIPPED | OUTPATIENT
Start: 2023-01-01 | End: 2023-01-01

## 2023-01-01 RX ORDER — LACTULOSE 20 G/30ML
10 SOLUTION ORAL
Status: DISCONTINUED | OUTPATIENT
Start: 2023-01-01 | End: 2023-01-01

## 2023-01-01 RX ORDER — SCOLOPAMINE TRANSDERMAL SYSTEM 1 MG/1
1 PATCH, EXTENDED RELEASE TRANSDERMAL
Status: DISCONTINUED | OUTPATIENT
Start: 2023-01-01 | End: 2023-01-01 | Stop reason: HOSPADM

## 2023-01-01 RX ORDER — EPHEDRINE SULFATE 50 MG/ML
5 INJECTION, SOLUTION INTRAVENOUS
Status: DISCONTINUED | OUTPATIENT
Start: 2023-01-01 | End: 2023-01-01 | Stop reason: HOSPADM

## 2023-01-01 RX ORDER — DEXTROSE AND SODIUM CHLORIDE 5; .9 G/100ML; G/100ML
INJECTION, SOLUTION INTRAVENOUS CONTINUOUS
Status: ACTIVE | OUTPATIENT
Start: 2023-01-01 | End: 2023-01-01

## 2023-01-01 RX ORDER — SCOLOPAMINE TRANSDERMAL SYSTEM 1 MG/1
1 PATCH, EXTENDED RELEASE TRANSDERMAL
Status: DISCONTINUED | OUTPATIENT
Start: 2023-01-01 | End: 2023-01-01

## 2023-01-01 RX ORDER — MEPERIDINE HYDROCHLORIDE 25 MG/ML
12.5 INJECTION INTRAMUSCULAR; INTRAVENOUS; SUBCUTANEOUS
Status: DISCONTINUED | OUTPATIENT
Start: 2023-01-01 | End: 2023-01-01 | Stop reason: HOSPADM

## 2023-01-01 RX ORDER — TRAMADOL HYDROCHLORIDE 50 MG/1
50 TABLET ORAL EVERY 6 HOURS PRN
Status: DISCONTINUED | OUTPATIENT
Start: 2023-01-01 | End: 2023-01-01 | Stop reason: HOSPADM

## 2023-01-01 RX ORDER — VANCOMYCIN HYDROCHLORIDE 125 MG/1
125 CAPSULE ORAL 4 TIMES DAILY
COMMUNITY

## 2023-01-01 RX ORDER — HYDROMORPHONE HYDROCHLORIDE 1 MG/ML
1 INJECTION, SOLUTION INTRAMUSCULAR; INTRAVENOUS; SUBCUTANEOUS
Status: DISCONTINUED | OUTPATIENT
Start: 2023-01-01 | End: 2023-01-01

## 2023-01-01 RX ORDER — PROCHLORPERAZINE MALEATE 10 MG
10 TABLET ORAL EVERY 6 HOURS PRN
Status: DISCONTINUED | OUTPATIENT
Start: 2023-01-01 | End: 2023-01-01 | Stop reason: HOSPADM

## 2023-01-01 RX ORDER — OXYCODONE HYDROCHLORIDE 5 MG/1
2.5 TABLET ORAL
Status: DISCONTINUED | OUTPATIENT
Start: 2023-01-01 | End: 2023-01-01 | Stop reason: HOSPADM

## 2023-01-01 RX ORDER — LENVATINIB 10 MG/1
20 CAPSULE ORAL DAILY
COMMUNITY
Start: 2023-01-01

## 2023-01-01 RX ORDER — ENOXAPARIN SODIUM 100 MG/ML
40 INJECTION SUBCUTANEOUS EVERY 12 HOURS
COMMUNITY
End: 2023-01-01

## 2023-01-01 RX ORDER — HEPARIN SODIUM 5000 [USP'U]/ML
5000 INJECTION, SOLUTION INTRAVENOUS; SUBCUTANEOUS EVERY 8 HOURS
Status: DISCONTINUED | OUTPATIENT
Start: 2023-01-01 | End: 2023-01-01

## 2023-01-01 RX ORDER — ONDANSETRON 4 MG/1
4 TABLET, ORALLY DISINTEGRATING ORAL EVERY 6 HOURS PRN
Status: ON HOLD | COMMUNITY
End: 2023-01-01 | Stop reason: SDUPTHER

## 2023-01-01 RX ORDER — MIDAZOLAM HYDROCHLORIDE 1 MG/ML
INJECTION INTRAMUSCULAR; INTRAVENOUS
Status: COMPLETED
Start: 2023-01-01 | End: 2023-01-01

## 2023-01-01 RX ORDER — HYDROMORPHONE HYDROCHLORIDE 1 MG/ML
0.4 INJECTION, SOLUTION INTRAMUSCULAR; INTRAVENOUS; SUBCUTANEOUS
Status: DISCONTINUED | OUTPATIENT
Start: 2023-01-01 | End: 2023-01-01 | Stop reason: HOSPADM

## 2023-01-01 RX ORDER — OXYCODONE HYDROCHLORIDE 5 MG/1
5 TABLET ORAL EVERY 4 HOURS PRN
COMMUNITY

## 2023-01-01 RX ORDER — MIDAZOLAM HYDROCHLORIDE 1 MG/ML
.5-2 INJECTION INTRAMUSCULAR; INTRAVENOUS PRN
Status: ACTIVE | OUTPATIENT
Start: 2023-01-01 | End: 2023-01-01

## 2023-01-01 RX ORDER — ONDANSETRON 2 MG/ML
4 INJECTION INTRAMUSCULAR; INTRAVENOUS PRN
Status: ACTIVE | OUTPATIENT
Start: 2023-01-01 | End: 2023-01-01

## 2023-01-01 RX ORDER — MIDAZOLAM HYDROCHLORIDE 1 MG/ML
INJECTION INTRAMUSCULAR; INTRAVENOUS PRN
Status: DISCONTINUED | OUTPATIENT
Start: 2023-01-01 | End: 2023-01-01 | Stop reason: SURG

## 2023-01-01 RX ORDER — DEXAMETHASONE 1 MG
2 TABLET ORAL DAILY
Status: DISCONTINUED | OUTPATIENT
Start: 2023-01-01 | End: 2023-01-01

## 2023-01-01 RX ORDER — POLYETHYLENE GLYCOL 3350 17 G/17G
1 POWDER, FOR SOLUTION ORAL
Status: DISCONTINUED | OUTPATIENT
Start: 2023-01-01 | End: 2023-01-01

## 2023-01-01 RX ORDER — ONDANSETRON 4 MG/1
4 TABLET, ORALLY DISINTEGRATING ORAL EVERY 4 HOURS PRN
Status: DISCONTINUED | OUTPATIENT
Start: 2023-01-01 | End: 2023-01-01 | Stop reason: HOSPADM

## 2023-01-01 RX ORDER — SODIUM CHLORIDE, SODIUM LACTATE, POTASSIUM CHLORIDE, CALCIUM CHLORIDE 600; 310; 30; 20 MG/100ML; MG/100ML; MG/100ML; MG/100ML
INJECTION, SOLUTION INTRAVENOUS CONTINUOUS
Status: ACTIVE | OUTPATIENT
Start: 2023-01-01 | End: 2023-01-01

## 2023-01-01 RX ORDER — MEGESTROL ACETATE 40 MG/ML
800 SUSPENSION ORAL DAILY
Qty: 30 ML | Refills: 2 | Status: CANCELLED | OUTPATIENT
Start: 2023-01-01

## 2023-01-01 RX ORDER — PANTOPRAZOLE SODIUM 40 MG/10ML
40 INJECTION, POWDER, LYOPHILIZED, FOR SOLUTION INTRAVENOUS DAILY
Status: DISCONTINUED | OUTPATIENT
Start: 2023-01-01 | End: 2023-01-01

## 2023-01-01 RX ORDER — ONDANSETRON 4 MG/1
4 TABLET, ORALLY DISINTEGRATING ORAL EVERY 4 HOURS PRN
Qty: 60 TABLET | Refills: 2 | Status: CANCELLED | OUTPATIENT
Start: 2023-01-01

## 2023-01-01 RX ORDER — HALOPERIDOL 5 MG/ML
1 INJECTION INTRAMUSCULAR
Status: DISCONTINUED | OUTPATIENT
Start: 2023-01-01 | End: 2023-01-01 | Stop reason: HOSPADM

## 2023-01-01 RX ORDER — DIPHENHYDRAMINE HYDROCHLORIDE 50 MG/ML
12.5 INJECTION INTRAMUSCULAR; INTRAVENOUS
Status: DISCONTINUED | OUTPATIENT
Start: 2023-01-01 | End: 2023-01-01 | Stop reason: HOSPADM

## 2023-01-01 RX ORDER — BISACODYL 10 MG
10 SUPPOSITORY, RECTAL RECTAL DAILY
Status: DISCONTINUED | OUTPATIENT
Start: 2023-01-01 | End: 2023-01-01

## 2023-01-01 RX ORDER — PROCHLORPERAZINE EDISYLATE 5 MG/ML
5-10 INJECTION INTRAMUSCULAR; INTRAVENOUS EVERY 4 HOURS PRN
Status: DISCONTINUED | OUTPATIENT
Start: 2023-01-01 | End: 2023-01-01

## 2023-01-01 RX ORDER — SODIUM CHLORIDE, SODIUM GLUCONATE, SODIUM ACETATE, POTASSIUM CHLORIDE AND MAGNESIUM CHLORIDE 526; 502; 368; 37; 30 MG/100ML; MG/100ML; MG/100ML; MG/100ML; MG/100ML
INJECTION, SOLUTION INTRAVENOUS
Status: DISCONTINUED | OUTPATIENT
Start: 2023-01-01 | End: 2023-01-01 | Stop reason: SURG

## 2023-01-01 RX ORDER — DEXTROSE MONOHYDRATE 100 MG/ML
INJECTION, SOLUTION INTRAVENOUS PRN
Status: DISCONTINUED | OUTPATIENT
Start: 2023-01-01 | End: 2023-01-01 | Stop reason: ALTCHOICE

## 2023-01-01 RX ORDER — OMEPRAZOLE 20 MG/1
20 CAPSULE, DELAYED RELEASE ORAL
COMMUNITY

## 2023-01-01 RX ORDER — DOCUSATE SODIUM 50 MG/5ML
100 LIQUID ORAL 2 TIMES DAILY
Status: DISCONTINUED | OUTPATIENT
Start: 2023-01-01 | End: 2023-11-28 | Stop reason: HOSPADM

## 2023-01-01 RX ORDER — OXYCODONE HYDROCHLORIDE 10 MG/1
10 TABLET ORAL
Status: CANCELLED | OUTPATIENT
Start: 2023-01-01

## 2023-01-01 RX ORDER — HYDROMORPHONE HYDROCHLORIDE 1 MG/ML
0.25 INJECTION, SOLUTION INTRAMUSCULAR; INTRAVENOUS; SUBCUTANEOUS
Status: DISCONTINUED | OUTPATIENT
Start: 2023-01-01 | End: 2023-01-01 | Stop reason: HOSPADM

## 2023-01-01 RX ORDER — DEXAMETHASONE SODIUM PHOSPHATE 4 MG/ML
4 INJECTION, SOLUTION INTRA-ARTICULAR; INTRALESIONAL; INTRAMUSCULAR; INTRAVENOUS; SOFT TISSUE
Status: DISCONTINUED | OUTPATIENT
Start: 2023-01-01 | End: 2023-01-01 | Stop reason: HOSPADM

## 2023-01-01 RX ORDER — ONDANSETRON 2 MG/ML
4 INJECTION INTRAMUSCULAR; INTRAVENOUS
Status: COMPLETED | OUTPATIENT
Start: 2023-01-01 | End: 2023-01-01

## 2023-01-01 RX ORDER — HYDROMORPHONE HYDROCHLORIDE 1 MG/ML
0.2 INJECTION, SOLUTION INTRAMUSCULAR; INTRAVENOUS; SUBCUTANEOUS
Status: DISCONTINUED | OUTPATIENT
Start: 2023-01-01 | End: 2023-01-01 | Stop reason: HOSPADM

## 2023-01-01 RX ORDER — SODIUM CHLORIDE 9 MG/ML
INJECTION, SOLUTION INTRAVENOUS CONTINUOUS
Status: DISCONTINUED | OUTPATIENT
Start: 2023-01-01 | End: 2023-01-01

## 2023-01-01 RX ORDER — ACETAMINOPHEN 650 MG/1
650 SUPPOSITORY RECTAL EVERY 4 HOURS PRN
Status: DISCONTINUED | OUTPATIENT
Start: 2023-01-01 | End: 2023-01-01

## 2023-01-01 RX ORDER — DIPHENHYDRAMINE HCL 25 MG
12.5 TABLET ORAL EVERY 6 HOURS PRN
Status: DISCONTINUED | OUTPATIENT
Start: 2023-01-01 | End: 2023-01-01 | Stop reason: HOSPADM

## 2023-01-01 RX ORDER — EPHEDRINE SULFATE 50 MG/ML
INJECTION, SOLUTION INTRAVENOUS PRN
Status: DISCONTINUED | OUTPATIENT
Start: 2023-01-01 | End: 2023-01-01 | Stop reason: SURG

## 2023-01-01 RX ORDER — METRONIDAZOLE 500 MG/100ML
500 INJECTION, SOLUTION INTRAVENOUS EVERY 8 HOURS
Status: DISCONTINUED | OUTPATIENT
Start: 2023-01-01 | End: 2023-01-01

## 2023-01-01 RX ORDER — SODIUM CHLORIDE, SODIUM LACTATE, POTASSIUM CHLORIDE, CALCIUM CHLORIDE 600; 310; 30; 20 MG/100ML; MG/100ML; MG/100ML; MG/100ML
INJECTION, SOLUTION INTRAVENOUS CONTINUOUS
Status: DISCONTINUED | OUTPATIENT
Start: 2023-01-01 | End: 2023-01-01

## 2023-01-01 RX ORDER — HYDROMORPHONE HYDROCHLORIDE 1 MG/ML
0.5 INJECTION, SOLUTION INTRAMUSCULAR; INTRAVENOUS; SUBCUTANEOUS EVERY 4 HOURS PRN
Status: DISCONTINUED | OUTPATIENT
Start: 2023-01-01 | End: 2023-01-01

## 2023-01-01 RX ORDER — DIPHENHYDRAMINE HYDROCHLORIDE 50 MG/ML
25 INJECTION INTRAMUSCULAR; INTRAVENOUS EVERY 6 HOURS PRN
Status: DISCONTINUED | OUTPATIENT
Start: 2023-01-01 | End: 2023-01-01

## 2023-01-01 RX ORDER — ONDANSETRON 2 MG/ML
8 INJECTION INTRAMUSCULAR; INTRAVENOUS EVERY 8 HOURS PRN
Status: DISCONTINUED | OUTPATIENT
Start: 2023-01-01 | End: 2023-01-01

## 2023-01-01 RX ORDER — OXYCODONE AND ACETAMINOPHEN 10; 325 MG/1; MG/1
1 TABLET ORAL EVERY 4 HOURS PRN
Status: ON HOLD | COMMUNITY
End: 2023-01-01

## 2023-01-01 RX ORDER — OXYCODONE HYDROCHLORIDE 5 MG/1
5 TABLET ORAL EVERY 4 HOURS PRN
Qty: 18 TABLET | Refills: 0 | Status: SHIPPED | OUTPATIENT
Start: 2023-01-01 | End: 2023-01-01

## 2023-01-01 RX ORDER — HYDROCODONE BITARTRATE AND ACETAMINOPHEN 5; 325 MG/1; MG/1
TABLET ORAL
Status: ON HOLD | COMMUNITY
Start: 2023-01-01 | End: 2023-01-01

## 2023-01-01 RX ORDER — OXYCODONE HYDROCHLORIDE 5 MG/1
5 TABLET ORAL
Status: DISCONTINUED | OUTPATIENT
Start: 2023-01-01 | End: 2023-01-01

## 2023-01-01 RX ORDER — SODIUM CHLORIDE, SODIUM LACTATE, POTASSIUM CHLORIDE, CALCIUM CHLORIDE 600; 310; 30; 20 MG/100ML; MG/100ML; MG/100ML; MG/100ML
INJECTION, SOLUTION INTRAVENOUS CONTINUOUS
Status: DISCONTINUED | OUTPATIENT
Start: 2023-01-01 | End: 2023-01-01 | Stop reason: HOSPADM

## 2023-01-01 RX ORDER — ONDANSETRON 2 MG/ML
8 INJECTION INTRAMUSCULAR; INTRAVENOUS EVERY 8 HOURS PRN
Status: DISCONTINUED | OUTPATIENT
Start: 2023-01-01 | End: 2023-11-28 | Stop reason: HOSPADM

## 2023-01-01 RX ORDER — MORPHINE SULFATE 4 MG/ML
4 INJECTION INTRAVENOUS
Status: DISCONTINUED | OUTPATIENT
Start: 2023-01-01 | End: 2023-01-01

## 2023-01-01 RX ORDER — LORAZEPAM 2 MG/ML
1 CONCENTRATE ORAL
Status: DISCONTINUED | OUTPATIENT
Start: 2023-01-01 | End: 2023-11-28 | Stop reason: HOSPADM

## 2023-01-01 RX ORDER — ONDANSETRON 4 MG/1
4 TABLET, ORALLY DISINTEGRATING ORAL EVERY 6 HOURS PRN
Qty: 10 TABLET | Refills: 0 | Status: SHIPPED | OUTPATIENT
Start: 2023-01-01

## 2023-01-01 RX ORDER — PROMETHAZINE HYDROCHLORIDE 25 MG/1
12.5-25 SUPPOSITORY RECTAL EVERY 4 HOURS PRN
Status: DISCONTINUED | OUTPATIENT
Start: 2023-01-01 | End: 2023-11-28 | Stop reason: HOSPADM

## 2023-01-01 RX ORDER — GLYCOPYRROLATE 1 MG/1
1 TABLET ORAL 3 TIMES DAILY PRN
Status: DISCONTINUED | OUTPATIENT
Start: 2023-01-01 | End: 2023-11-28 | Stop reason: HOSPADM

## 2023-01-01 RX ORDER — ONDANSETRON 4 MG/1
8 TABLET, ORALLY DISINTEGRATING ORAL EVERY 8 HOURS PRN
Status: DISCONTINUED | OUTPATIENT
Start: 2023-01-01 | End: 2023-11-28 | Stop reason: HOSPADM

## 2023-01-01 RX ORDER — ACETAMINOPHEN 500 MG
1000 TABLET ORAL EVERY 6 HOURS PRN
Status: DISCONTINUED | OUTPATIENT
Start: 2023-01-01 | End: 2023-01-01

## 2023-01-01 RX ORDER — DEXAMETHASONE 1 MG
1 TABLET ORAL DAILY
Status: COMPLETED | OUTPATIENT
Start: 2023-01-01 | End: 2023-01-01

## 2023-01-01 RX ORDER — BISACODYL 10 MG
10 SUPPOSITORY, RECTAL RECTAL
Status: DISCONTINUED | OUTPATIENT
Start: 2023-01-01 | End: 2023-11-28 | Stop reason: HOSPADM

## 2023-01-01 RX ORDER — SODIUM CHLORIDE 9 MG/ML
500 INJECTION, SOLUTION INTRAVENOUS
Status: ACTIVE | OUTPATIENT
Start: 2023-01-01 | End: 2023-01-01

## 2023-01-01 RX ORDER — CALCIUM CARBONATE 500 MG/1
500 TABLET, CHEWABLE ORAL 3 TIMES DAILY PRN
COMMUNITY

## 2023-01-01 RX ORDER — SODIUM CHLORIDE 9 MG/ML
INJECTION, SOLUTION INTRAVENOUS CONTINUOUS
Status: ACTIVE | OUTPATIENT
Start: 2023-01-01 | End: 2023-01-01

## 2023-01-01 RX ORDER — CARBOXYMETHYLCELLULOSE SODIUM 5 MG/ML
1 SOLUTION/ DROPS OPHTHALMIC PRN
Status: DISCONTINUED | OUTPATIENT
Start: 2023-01-01 | End: 2023-11-28 | Stop reason: HOSPADM

## 2023-01-01 RX ORDER — PROMETHAZINE HYDROCHLORIDE 25 MG/1
12.5-25 TABLET ORAL EVERY 4 HOURS PRN
Status: DISCONTINUED | OUTPATIENT
Start: 2023-01-01 | End: 2023-01-01

## 2023-01-01 RX ORDER — LIDOCAINE HYDROCHLORIDE 20 MG/ML
INJECTION, SOLUTION EPIDURAL; INFILTRATION; INTRACAUDAL; PERINEURAL PRN
Status: DISCONTINUED | OUTPATIENT
Start: 2023-01-01 | End: 2023-01-01 | Stop reason: SURG

## 2023-01-01 RX ORDER — ACETAMINOPHEN 325 MG/1
650 TABLET ORAL EVERY 6 HOURS PRN
Status: DISCONTINUED | OUTPATIENT
Start: 2023-01-01 | End: 2023-01-01

## 2023-01-01 RX ORDER — METOCLOPRAMIDE 10 MG/1
10 TABLET ORAL EVERY 6 HOURS PRN
Status: ON HOLD | COMMUNITY
Start: 2023-01-01 | End: 2023-01-01

## 2023-01-01 RX ORDER — PROCHLORPERAZINE MALEATE 10 MG
10 TABLET ORAL EVERY 6 HOURS PRN
Qty: 30 TABLET | Refills: 3 | Status: CANCELLED | OUTPATIENT
Start: 2023-01-01

## 2023-01-01 RX ORDER — INSULIN LISPRO 100 [IU]/ML
1-6 INJECTION, SOLUTION INTRAVENOUS; SUBCUTANEOUS EVERY 6 HOURS
Status: DISCONTINUED | OUTPATIENT
Start: 2023-01-01 | End: 2023-01-01

## 2023-01-01 RX ORDER — OXYCODONE HYDROCHLORIDE 5 MG/1
5 TABLET ORAL
Status: DISCONTINUED | OUTPATIENT
Start: 2023-01-01 | End: 2023-01-01 | Stop reason: HOSPADM

## 2023-01-01 RX ORDER — ACETAMINOPHEN 500 MG
1000 TABLET ORAL ONCE
Status: COMPLETED | OUTPATIENT
Start: 2023-01-01 | End: 2023-01-01

## 2023-01-01 RX ORDER — ONDANSETRON 4 MG/1
8 TABLET, ORALLY DISINTEGRATING ORAL EVERY 8 HOURS PRN
Status: DISCONTINUED | OUTPATIENT
Start: 2023-01-01 | End: 2023-01-01

## 2023-01-01 RX ORDER — NAPROXEN SODIUM 220 MG
220 TABLET ORAL PRN
Status: ON HOLD | COMMUNITY
End: 2023-01-01

## 2023-01-01 RX ORDER — TRAMADOL HYDROCHLORIDE 50 MG/1
50 TABLET ORAL EVERY 4 HOURS PRN
Status: ON HOLD | COMMUNITY
End: 2023-01-01

## 2023-01-01 RX ORDER — PROMETHAZINE HYDROCHLORIDE 25 MG/1
25 SUPPOSITORY RECTAL EVERY 6 HOURS PRN
Qty: 10 SUPPOSITORY | Refills: 2 | Status: CANCELLED | OUTPATIENT
Start: 2023-01-01

## 2023-01-01 RX ORDER — PANTOPRAZOLE SODIUM 40 MG/10ML
40 INJECTION, POWDER, LYOPHILIZED, FOR SOLUTION INTRAVENOUS DAILY
Status: DISCONTINUED | OUTPATIENT
Start: 2023-01-01 | End: 2023-01-01 | Stop reason: HOSPADM

## 2023-01-01 RX ORDER — DEXAMETHASONE 1 MG
2 TABLET ORAL DAILY
Status: COMPLETED | OUTPATIENT
Start: 2023-01-01 | End: 2023-01-01

## 2023-01-01 RX ORDER — PROCHLORPERAZINE MALEATE 10 MG
10 TABLET ORAL EVERY 6 HOURS PRN
Qty: 30 TABLET | Refills: 0 | Status: SHIPPED | OUTPATIENT
Start: 2023-01-01

## 2023-01-01 RX ORDER — DEXAMETHASONE 1 MG
1 TABLET ORAL DAILY
Status: DISCONTINUED | OUTPATIENT
Start: 2023-01-01 | End: 2023-01-01

## 2023-01-01 RX ORDER — SCOLOPAMINE TRANSDERMAL SYSTEM 1 MG/1
1 PATCH, EXTENDED RELEASE TRANSDERMAL
Status: DISCONTINUED | OUTPATIENT
Start: 2023-01-01 | End: 2023-11-28 | Stop reason: HOSPADM

## 2023-01-01 RX ORDER — LIDOCAINE HYDROCHLORIDE AND EPINEPHRINE 15; 5 MG/ML; UG/ML
INJECTION, SOLUTION EPIDURAL
Status: COMPLETED | OUTPATIENT
Start: 2023-01-01 | End: 2023-01-01

## 2023-01-01 RX ORDER — HYDROMORPHONE HYDROCHLORIDE 1 MG/ML
0.1 INJECTION, SOLUTION INTRAMUSCULAR; INTRAVENOUS; SUBCUTANEOUS
Status: DISCONTINUED | OUTPATIENT
Start: 2023-01-01 | End: 2023-01-01 | Stop reason: HOSPADM

## 2023-01-01 RX ORDER — OXYCODONE HYDROCHLORIDE 10 MG/1
10 TABLET ORAL
Status: DISCONTINUED | OUTPATIENT
Start: 2023-01-01 | End: 2023-01-01 | Stop reason: HOSPADM

## 2023-01-01 RX ORDER — OXYCODONE HYDROCHLORIDE 10 MG/1
10 TABLET ORAL
Status: DISCONTINUED | OUTPATIENT
Start: 2023-01-01 | End: 2023-01-01

## 2023-01-01 RX ORDER — EPHEDRINE SULFATE 50 MG/ML
5 INJECTION, SOLUTION INTRAVENOUS
Status: DISCONTINUED | OUTPATIENT
Start: 2023-01-01 | End: 2023-01-01

## 2023-01-01 RX ORDER — DIPHENHYDRAMINE HYDROCHLORIDE 50 MG/ML
25 INJECTION INTRAMUSCULAR; INTRAVENOUS EVERY 6 HOURS PRN
Status: DISCONTINUED | OUTPATIENT
Start: 2023-01-01 | End: 2023-01-01 | Stop reason: HOSPADM

## 2023-01-01 RX ORDER — HYDRALAZINE HYDROCHLORIDE 20 MG/ML
5 INJECTION INTRAMUSCULAR; INTRAVENOUS
Status: DISCONTINUED | OUTPATIENT
Start: 2023-01-01 | End: 2023-01-01 | Stop reason: HOSPADM

## 2023-01-01 RX ORDER — HYDROMORPHONE HYDROCHLORIDE 2 MG/ML
INJECTION, SOLUTION INTRAMUSCULAR; INTRAVENOUS; SUBCUTANEOUS PRN
Status: DISCONTINUED | OUTPATIENT
Start: 2023-01-01 | End: 2023-01-01 | Stop reason: SURG

## 2023-01-01 RX ORDER — DEXTROSE MONOHYDRATE 100 MG/ML
250 INJECTION, SOLUTION INTRAVENOUS ONCE
Status: COMPLETED | OUTPATIENT
Start: 2023-01-01 | End: 2023-01-01

## 2023-01-01 RX ORDER — EPHEDRINE SULFATE 50 MG/ML
10 INJECTION, SOLUTION INTRAVENOUS
Status: DISCONTINUED | OUTPATIENT
Start: 2023-01-01 | End: 2023-01-01

## 2023-01-01 RX ORDER — SODIUM CHLORIDE, SODIUM LACTATE, POTASSIUM CHLORIDE, AND CALCIUM CHLORIDE .6; .31; .03; .02 G/100ML; G/100ML; G/100ML; G/100ML
250 INJECTION, SOLUTION INTRAVENOUS PRN
Status: DISCONTINUED | OUTPATIENT
Start: 2023-01-01 | End: 2023-01-01

## 2023-01-01 RX ORDER — SODIUM CHLORIDE, SODIUM LACTATE, POTASSIUM CHLORIDE, AND CALCIUM CHLORIDE .6; .31; .03; .02 G/100ML; G/100ML; G/100ML; G/100ML
500 INJECTION, SOLUTION INTRAVENOUS ONCE
Status: COMPLETED | OUTPATIENT
Start: 2023-01-01 | End: 2023-01-01

## 2023-01-01 RX ORDER — MAGNESIUM SULFATE 1 G/100ML
1 INJECTION INTRAVENOUS ONCE
Status: COMPLETED | OUTPATIENT
Start: 2023-01-01 | End: 2023-01-01

## 2023-01-01 RX ORDER — DEXAMETHASONE 1 MG
2 TABLET ORAL EVERY 12 HOURS
Status: COMPLETED | OUTPATIENT
Start: 2023-01-01 | End: 2023-01-01

## 2023-01-01 RX ORDER — BISACODYL 10 MG
10 SUPPOSITORY, RECTAL RECTAL ONCE
Status: COMPLETED | OUTPATIENT
Start: 2023-01-01 | End: 2023-01-01

## 2023-01-01 RX ORDER — DEXAMETHASONE SODIUM PHOSPHATE 4 MG/ML
4 INJECTION, SOLUTION INTRA-ARTICULAR; INTRALESIONAL; INTRAMUSCULAR; INTRAVENOUS; SOFT TISSUE
Status: COMPLETED | OUTPATIENT
Start: 2023-01-01 | End: 2023-01-01

## 2023-01-01 RX ORDER — DEXTROSE AND SODIUM CHLORIDE 5; .9 G/100ML; G/100ML
INJECTION, SOLUTION INTRAVENOUS CONTINUOUS
Status: DISCONTINUED | OUTPATIENT
Start: 2023-01-01 | End: 2023-01-01

## 2023-01-01 RX ORDER — BUPIVACAINE HYDROCHLORIDE 2.5 MG/ML
INJECTION, SOLUTION EPIDURAL; INFILTRATION; INTRACAUDAL
Status: COMPLETED | OUTPATIENT
Start: 2023-01-01 | End: 2023-01-01

## 2023-01-01 RX ORDER — CEFAZOLIN SODIUM 1 G/3ML
INJECTION, POWDER, FOR SOLUTION INTRAMUSCULAR; INTRAVENOUS
Status: COMPLETED
Start: 2023-01-01 | End: 2023-01-01

## 2023-01-01 RX ORDER — ACETAMINOPHEN 325 MG/1
650 TABLET ORAL EVERY 6 HOURS PRN
Status: DISCONTINUED | OUTPATIENT
Start: 2023-01-01 | End: 2023-01-01 | Stop reason: HOSPADM

## 2023-01-01 RX ORDER — DIPHENHYDRAMINE HYDROCHLORIDE 50 MG/ML
12.5 INJECTION INTRAMUSCULAR; INTRAVENOUS EVERY 6 HOURS PRN
Status: DISCONTINUED | OUTPATIENT
Start: 2023-01-01 | End: 2023-01-01 | Stop reason: HOSPADM

## 2023-01-01 RX ORDER — DEXAMETHASONE SODIUM PHOSPHATE 4 MG/ML
4 INJECTION, SOLUTION INTRA-ARTICULAR; INTRALESIONAL; INTRAMUSCULAR; INTRAVENOUS; SOFT TISSUE
Status: DISCONTINUED | OUTPATIENT
Start: 2023-01-01 | End: 2023-01-01

## 2023-01-01 RX ORDER — ENEMA 19; 7 G/133ML; G/133ML
1 ENEMA RECTAL ONCE
Status: COMPLETED | OUTPATIENT
Start: 2023-01-01 | End: 2023-01-01

## 2023-01-01 RX ORDER — MIDAZOLAM HYDROCHLORIDE 1 MG/ML
1 INJECTION INTRAMUSCULAR; INTRAVENOUS
Status: DISCONTINUED | OUTPATIENT
Start: 2023-01-01 | End: 2023-01-01 | Stop reason: HOSPADM

## 2023-01-01 RX ORDER — GLYCOPYRROLATE 0.2 MG/ML
0.2 INJECTION INTRAMUSCULAR; INTRAVENOUS 3 TIMES DAILY PRN
Status: DISCONTINUED | OUTPATIENT
Start: 2023-01-01 | End: 2023-11-28 | Stop reason: HOSPADM

## 2023-01-01 RX ORDER — DEXAMETHASONE SODIUM PHOSPHATE 4 MG/ML
INJECTION, SOLUTION INTRA-ARTICULAR; INTRALESIONAL; INTRAMUSCULAR; INTRAVENOUS; SOFT TISSUE PRN
Status: DISCONTINUED | OUTPATIENT
Start: 2023-01-01 | End: 2023-01-01 | Stop reason: SURG

## 2023-01-01 RX ORDER — LIDOCAINE 50 MG/G
1 PATCH TOPICAL EVERY 24 HOURS
Status: DISCONTINUED | OUTPATIENT
Start: 2023-01-01 | End: 2023-01-01 | Stop reason: HOSPADM

## 2023-01-01 RX ORDER — OXYCODONE HYDROCHLORIDE 5 MG/1
TABLET ORAL
Status: CANCELLED | OUTPATIENT
Start: 2023-01-01

## 2023-01-01 RX ORDER — HYDROMORPHONE HYDROCHLORIDE 2 MG/ML
INJECTION, SOLUTION INTRAMUSCULAR; INTRAVENOUS; SUBCUTANEOUS
Status: COMPLETED
Start: 2023-01-01 | End: 2023-01-01

## 2023-01-01 RX ORDER — OMEPRAZOLE 20 MG/1
20 CAPSULE, DELAYED RELEASE ORAL DAILY
Qty: 30 CAPSULE | Refills: 0 | Status: ON HOLD | OUTPATIENT
Start: 2023-01-01 | End: 2023-01-01

## 2023-01-01 RX ORDER — LACTULOSE 20 G/30ML
10 SOLUTION ORAL
Status: DISCONTINUED | OUTPATIENT
Start: 2023-01-01 | End: 2023-11-28 | Stop reason: HOSPADM

## 2023-01-01 RX ORDER — SODIUM CHLORIDE 9 MG/ML
1000 INJECTION, SOLUTION INTRAVENOUS
Status: DISCONTINUED | OUTPATIENT
Start: 2023-01-01 | End: 2023-01-01 | Stop reason: HOSPADM

## 2023-01-01 RX ORDER — AMOXICILLIN 250 MG
2 CAPSULE ORAL 2 TIMES DAILY
Status: DISCONTINUED | OUTPATIENT
Start: 2023-01-01 | End: 2023-01-01

## 2023-01-01 RX ORDER — LIDOCAINE HYDROCHLORIDE 20 MG/ML
5 SOLUTION OROPHARYNGEAL EVERY 4 HOURS PRN
Status: DISCONTINUED | OUTPATIENT
Start: 2023-01-01 | End: 2023-11-28 | Stop reason: HOSPADM

## 2023-01-01 RX ORDER — HYDROMORPHONE HYDROCHLORIDE 1 MG/ML
0.5 INJECTION, SOLUTION INTRAMUSCULAR; INTRAVENOUS; SUBCUTANEOUS
Status: CANCELLED | OUTPATIENT
Start: 2023-01-01

## 2023-01-01 RX ORDER — OMEPRAZOLE 20 MG/1
20 CAPSULE, DELAYED RELEASE ORAL
Status: DISCONTINUED | OUTPATIENT
Start: 2023-01-01 | End: 2023-01-01

## 2023-01-01 RX ORDER — ONDANSETRON 2 MG/ML
4 INJECTION INTRAMUSCULAR; INTRAVENOUS EVERY 8 HOURS PRN
Status: DISCONTINUED | OUTPATIENT
Start: 2023-01-01 | End: 2023-01-01 | Stop reason: HOSPADM

## 2023-01-01 RX ORDER — PROCHLORPERAZINE EDISYLATE 5 MG/ML
10 INJECTION INTRAMUSCULAR; INTRAVENOUS EVERY 6 HOURS PRN
Status: DISCONTINUED | OUTPATIENT
Start: 2023-01-01 | End: 2023-01-01

## 2023-01-01 RX ORDER — CEFAZOLIN SODIUM 1 G/3ML
INJECTION, POWDER, FOR SOLUTION INTRAMUSCULAR; INTRAVENOUS PRN
Status: DISCONTINUED | OUTPATIENT
Start: 2023-01-01 | End: 2023-01-01 | Stop reason: SURG

## 2023-01-01 RX ORDER — PROMETHAZINE HYDROCHLORIDE 25 MG/1
25 SUPPOSITORY RECTAL EVERY 6 HOURS PRN
Status: DISCONTINUED | OUTPATIENT
Start: 2023-01-01 | End: 2023-01-01 | Stop reason: HOSPADM

## 2023-01-01 RX ORDER — LIDOCAINE 4 G/G
2 PATCH TOPICAL EVERY 24 HOURS
Status: DISCONTINUED | OUTPATIENT
Start: 2023-01-01 | End: 2023-01-01

## 2023-01-01 RX ADMIN — POTASSIUM CHLORIDE, DEXTROSE MONOHYDRATE AND SODIUM CHLORIDE: 150; 5; 450 INJECTION, SOLUTION INTRAVENOUS at 06:53

## 2023-01-01 RX ADMIN — HEPARIN SODIUM 5000 UNITS: 5000 INJECTION, SOLUTION INTRAVENOUS; SUBCUTANEOUS at 05:34

## 2023-01-01 RX ADMIN — INSULIN HUMAN 1 UNITS: 100 INJECTION, SOLUTION PARENTERAL at 06:10

## 2023-01-01 RX ADMIN — BISACODYL 10 MG: 10 SUPPOSITORY RECTAL at 10:11

## 2023-01-01 RX ADMIN — MORPHINE SULFATE 4 MG: 4 INJECTION, SOLUTION INTRAMUSCULAR; INTRAVENOUS at 20:34

## 2023-01-01 RX ADMIN — Medication: at 08:20

## 2023-01-01 RX ADMIN — VANCOMYCIN HYDROCHLORIDE 125 MG: 5 INJECTION, POWDER, LYOPHILIZED, FOR SOLUTION INTRAVENOUS at 17:05

## 2023-01-01 RX ADMIN — HEPARIN SODIUM 5000 UNITS: 5000 INJECTION, SOLUTION INTRAVENOUS; SUBCUTANEOUS at 21:47

## 2023-01-01 RX ADMIN — I.V. FAT EMULSION: 20 EMULSION INTRAVENOUS at 20:17

## 2023-01-01 RX ADMIN — PANTOPRAZOLE SODIUM 40 MG: 40 INJECTION, POWDER, FOR SOLUTION INTRAVENOUS at 14:01

## 2023-01-01 RX ADMIN — PIPERACILLIN AND TAZOBACTAM 3.38 G: 3; .375 INJECTION, POWDER, FOR SOLUTION INTRAVENOUS at 13:30

## 2023-01-01 RX ADMIN — HYDROMORPHONE HYDROCHLORIDE 1 MG: 2 INJECTION INTRAMUSCULAR; INTRAVENOUS; SUBCUTANEOUS at 11:44

## 2023-01-01 RX ADMIN — PANTOPRAZOLE SODIUM 40 MG: 40 INJECTION, POWDER, FOR SOLUTION INTRAVENOUS at 06:02

## 2023-01-01 RX ADMIN — HEPARIN SODIUM 5000 UNITS: 5000 INJECTION, SOLUTION INTRAVENOUS; SUBCUTANEOUS at 18:27

## 2023-01-01 RX ADMIN — HYDROMORPHONE HYDROCHLORIDE 0.5 MG: 1 INJECTION, SOLUTION INTRAMUSCULAR; INTRAVENOUS; SUBCUTANEOUS at 12:15

## 2023-01-01 RX ADMIN — VANCOMYCIN HYDROCHLORIDE 500 MG: 5 INJECTION, POWDER, LYOPHILIZED, FOR SOLUTION INTRAVENOUS at 06:39

## 2023-01-01 RX ADMIN — ENOXAPARIN SODIUM 40 MG: 100 INJECTION SUBCUTANEOUS at 18:43

## 2023-01-01 RX ADMIN — MORPHINE SULFATE 4 MG: 4 INJECTION, SOLUTION INTRAMUSCULAR; INTRAVENOUS at 10:11

## 2023-01-01 RX ADMIN — ACETAMINOPHEN 1000 MG: 500 TABLET ORAL at 17:07

## 2023-01-01 RX ADMIN — FENTANYL CITRATE 50 MCG: 50 INJECTION, SOLUTION INTRAMUSCULAR; INTRAVENOUS at 11:44

## 2023-01-01 RX ADMIN — HYDROMORPHONE HYDROCHLORIDE 1 MG: 1 INJECTION, SOLUTION INTRAMUSCULAR; INTRAVENOUS; SUBCUTANEOUS at 04:40

## 2023-01-01 RX ADMIN — PIPERACILLIN AND TAZOBACTAM 3.38 G: 3; .375 INJECTION, POWDER, FOR SOLUTION INTRAVENOUS at 05:45

## 2023-01-01 RX ADMIN — BUPIVACAINE HYDROCHLORIDE 1 ML: 2.5 INJECTION, SOLUTION EPIDURAL; INFILTRATION; INTRACAUDAL at 10:36

## 2023-01-01 RX ADMIN — PANTOPRAZOLE SODIUM 40 MG: 40 INJECTION, POWDER, FOR SOLUTION INTRAVENOUS at 06:03

## 2023-01-01 RX ADMIN — LORAZEPAM 1 MG: 2 INJECTION, SOLUTION INTRAMUSCULAR; INTRAVENOUS at 09:47

## 2023-01-01 RX ADMIN — POTASSIUM CHLORIDE, DEXTROSE MONOHYDRATE AND SODIUM CHLORIDE: 150; 5; 450 INJECTION, SOLUTION INTRAVENOUS at 11:09

## 2023-01-01 RX ADMIN — PIPERACILLIN AND TAZOBACTAM 3.38 G: 3; .375 INJECTION, POWDER, FOR SOLUTION INTRAVENOUS at 04:42

## 2023-01-01 RX ADMIN — THIAMINE HYDROCHLORIDE 100 MG: 100 INJECTION, SOLUTION INTRAMUSCULAR; INTRAVENOUS at 09:02

## 2023-01-01 RX ADMIN — MIDAZOLAM 1 MG: 1 INJECTION, SOLUTION INTRAMUSCULAR; INTRAVENOUS at 11:45

## 2023-01-01 RX ADMIN — DEXAMETHASONE SODIUM PHOSPHATE 4 MG: 4 INJECTION INTRA-ARTICULAR; INTRALESIONAL; INTRAMUSCULAR; INTRAVENOUS; SOFT TISSUE at 14:28

## 2023-01-01 RX ADMIN — SODIUM CHLORIDE: 9 INJECTION, SOLUTION INTRAVENOUS at 07:00

## 2023-01-01 RX ADMIN — OMEPRAZOLE 20 MG: 20 CAPSULE, DELAYED RELEASE ORAL at 11:41

## 2023-01-01 RX ADMIN — HYDROMORPHONE HYDROCHLORIDE 0.5 MG: 1 INJECTION, SOLUTION INTRAMUSCULAR; INTRAVENOUS; SUBCUTANEOUS at 04:41

## 2023-01-01 RX ADMIN — METRONIDAZOLE 500 MG: 500 INJECTION, SOLUTION INTRAVENOUS at 01:19

## 2023-01-01 RX ADMIN — HEPARIN SODIUM 5000 UNITS: 5000 INJECTION, SOLUTION INTRAVENOUS; SUBCUTANEOUS at 18:00

## 2023-01-01 RX ADMIN — DEXAMETHASONE SODIUM PHOSPHATE 4 MG: 4 INJECTION INTRA-ARTICULAR; INTRALESIONAL; INTRAMUSCULAR; INTRAVENOUS; SOFT TISSUE at 14:51

## 2023-01-01 RX ADMIN — OXYCODONE HYDROCHLORIDE 10 MG: 10 TABLET ORAL at 21:42

## 2023-01-01 RX ADMIN — OXYCODONE HYDROCHLORIDE 10 MG: 10 TABLET ORAL at 20:57

## 2023-01-01 RX ADMIN — HYDROMORPHONE HYDROCHLORIDE 0.5 MG: 1 INJECTION, SOLUTION INTRAMUSCULAR; INTRAVENOUS; SUBCUTANEOUS at 08:18

## 2023-01-01 RX ADMIN — SODIUM CHLORIDE, POTASSIUM CHLORIDE, SODIUM LACTATE AND CALCIUM CHLORIDE 500 ML: 600; 310; 30; 20 INJECTION, SOLUTION INTRAVENOUS at 22:18

## 2023-01-01 RX ADMIN — ACETAMINOPHEN 1000 MG: 500 TABLET ORAL at 18:26

## 2023-01-01 RX ADMIN — HYDROMORPHONE HYDROCHLORIDE 1 MG: 1 INJECTION, SOLUTION INTRAMUSCULAR; INTRAVENOUS; SUBCUTANEOUS at 06:21

## 2023-01-01 RX ADMIN — VANCOMYCIN HYDROCHLORIDE 500 MG: 5 INJECTION, POWDER, LYOPHILIZED, FOR SOLUTION INTRAVENOUS at 05:21

## 2023-01-01 RX ADMIN — DEXAMETHASONE SODIUM PHOSPHATE 4 MG: 4 INJECTION INTRA-ARTICULAR; INTRALESIONAL; INTRAMUSCULAR; INTRAVENOUS; SOFT TISSUE at 14:01

## 2023-01-01 RX ADMIN — HEPARIN SODIUM 5000 UNITS: 5000 INJECTION, SOLUTION INTRAVENOUS; SUBCUTANEOUS at 21:17

## 2023-01-01 RX ADMIN — Medication 100 MCG: at 10:23

## 2023-01-01 RX ADMIN — GLUCAGON 1 MG: 1 INJECTION, POWDER, LYOPHILIZED, FOR SOLUTION INTRAMUSCULAR; INTRAVENOUS at 14:20

## 2023-01-01 RX ADMIN — OXYCODONE HYDROCHLORIDE 10 MG: 5 SOLUTION ORAL at 12:50

## 2023-01-01 RX ADMIN — PANTOPRAZOLE SODIUM 40 MG: 40 INJECTION, POWDER, FOR SOLUTION INTRAVENOUS at 05:32

## 2023-01-01 RX ADMIN — I.V. FAT EMULSION: 20 EMULSION INTRAVENOUS at 21:15

## 2023-01-01 RX ADMIN — ROCURONIUM BROMIDE 10 MG: 50 INJECTION, SOLUTION INTRAVENOUS at 10:47

## 2023-01-01 RX ADMIN — LIDOCAINE PATCH 5% 1 PATCH: 700 PATCH TOPICAL at 21:34

## 2023-01-01 RX ADMIN — METRONIDAZOLE 500 MG: 500 INJECTION, SOLUTION INTRAVENOUS at 15:10

## 2023-01-01 RX ADMIN — METRONIDAZOLE 500 MG: 500 INJECTION, SOLUTION INTRAVENOUS at 21:29

## 2023-01-01 RX ADMIN — DEXTROSE MONOHYDRATE 250 ML: 100 INJECTION, SOLUTION INTRAVENOUS at 00:01

## 2023-01-01 RX ADMIN — MAGNESIUM SULFATE IN DEXTROSE 1 G: 10 INJECTION, SOLUTION INTRAVENOUS at 10:43

## 2023-01-01 RX ADMIN — Medication: at 07:27

## 2023-01-01 RX ADMIN — VANCOMYCIN HYDROCHLORIDE 125 MG: 5 INJECTION, POWDER, LYOPHILIZED, FOR SOLUTION INTRAVENOUS at 11:20

## 2023-01-01 RX ADMIN — DEXAMETHASONE SODIUM PHOSPHATE 4 MG: 4 INJECTION INTRA-ARTICULAR; INTRALESIONAL; INTRAMUSCULAR; INTRAVENOUS; SOFT TISSUE at 06:02

## 2023-01-01 RX ADMIN — HYDROMORPHONE HYDROCHLORIDE 0.5 MG: 1 INJECTION, SOLUTION INTRAMUSCULAR; INTRAVENOUS; SUBCUTANEOUS at 01:29

## 2023-01-01 RX ADMIN — HYDROMORPHONE HYDROCHLORIDE 1 MG: 1 INJECTION, SOLUTION INTRAMUSCULAR; INTRAVENOUS; SUBCUTANEOUS at 12:04

## 2023-01-01 RX ADMIN — SCOLOPAMINE TRANSDERMAL SYSTEM 1 PATCH: 1 PATCH, EXTENDED RELEASE TRANSDERMAL at 17:52

## 2023-01-01 RX ADMIN — HYDROMORPHONE HYDROCHLORIDE 0.5 MG: 2 INJECTION INTRAMUSCULAR; INTRAVENOUS; SUBCUTANEOUS at 08:37

## 2023-01-01 RX ADMIN — PROCHLORPERAZINE EDISYLATE 10 MG: 5 INJECTION INTRAMUSCULAR; INTRAVENOUS at 14:45

## 2023-01-01 RX ADMIN — BUPIVACAINE HYDROCHLORIDE 1 ML: 2.5 INJECTION, SOLUTION EPIDURAL; INFILTRATION; INTRACAUDAL at 10:05

## 2023-01-01 RX ADMIN — BUPIVACAINE HYDROCHLORIDE 0.5 ML: 2.5 INJECTION, SOLUTION EPIDURAL; INFILTRATION; INTRACAUDAL at 08:29

## 2023-01-01 RX ADMIN — VANCOMYCIN HYDROCHLORIDE 500 MG: 5 INJECTION, POWDER, LYOPHILIZED, FOR SOLUTION INTRAVENOUS at 00:29

## 2023-01-01 RX ADMIN — HEPARIN SODIUM 5000 UNITS: 5000 INJECTION, SOLUTION INTRAVENOUS; SUBCUTANEOUS at 14:22

## 2023-01-01 RX ADMIN — LORAZEPAM 1 MG: 2 INJECTION, SOLUTION INTRAMUSCULAR; INTRAVENOUS at 07:00

## 2023-01-01 RX ADMIN — THIAMINE HYDROCHLORIDE 100 MG: 100 INJECTION, SOLUTION INTRAMUSCULAR; INTRAVENOUS at 10:06

## 2023-01-01 RX ADMIN — MORPHINE SULFATE 4 MG: 4 INJECTION, SOLUTION INTRAMUSCULAR; INTRAVENOUS at 23:02

## 2023-01-01 RX ADMIN — HEPARIN SODIUM 5000 UNITS: 5000 INJECTION, SOLUTION INTRAVENOUS; SUBCUTANEOUS at 05:27

## 2023-01-01 RX ADMIN — ACETAMINOPHEN 1000 MG: 500 TABLET ORAL at 05:37

## 2023-01-01 RX ADMIN — SODIUM CHLORIDE, SODIUM GLUCONATE, SODIUM ACETATE, POTASSIUM CHLORIDE AND MAGNESIUM CHLORIDE: 526; 502; 368; 37; 30 INJECTION, SOLUTION INTRAVENOUS at 10:24

## 2023-01-01 RX ADMIN — HYDROMORPHONE HYDROCHLORIDE 0.5 MG: 1 INJECTION, SOLUTION INTRAMUSCULAR; INTRAVENOUS; SUBCUTANEOUS at 19:06

## 2023-01-01 RX ADMIN — OXYCODONE HYDROCHLORIDE 5 MG: 5 TABLET ORAL at 00:12

## 2023-01-01 RX ADMIN — HYDROMORPHONE HYDROCHLORIDE 0.5 MG: 1 INJECTION, SOLUTION INTRAMUSCULAR; INTRAVENOUS; SUBCUTANEOUS at 23:51

## 2023-01-01 RX ADMIN — VANCOMYCIN HYDROCHLORIDE 500 MG: 5 INJECTION, POWDER, LYOPHILIZED, FOR SOLUTION INTRAVENOUS at 04:39

## 2023-01-01 RX ADMIN — ONDANSETRON 4 MG: 2 INJECTION INTRAMUSCULAR; INTRAVENOUS at 11:35

## 2023-01-01 RX ADMIN — FENTANYL CITRATE 50 MCG: 50 INJECTION, SOLUTION INTRAMUSCULAR; INTRAVENOUS at 14:16

## 2023-01-01 RX ADMIN — INSULIN HUMAN 2 UNITS: 100 INJECTION, SOLUTION PARENTERAL at 06:11

## 2023-01-01 RX ADMIN — PIPERACILLIN AND TAZOBACTAM 3.38 G: 3; .375 INJECTION, POWDER, FOR SOLUTION INTRAVENOUS at 04:07

## 2023-01-01 RX ADMIN — LORAZEPAM 1 MG: 2 INJECTION, SOLUTION INTRAMUSCULAR; INTRAVENOUS at 19:28

## 2023-01-01 RX ADMIN — MORPHINE SULFATE 4 MG: 4 INJECTION, SOLUTION INTRAMUSCULAR; INTRAVENOUS at 16:26

## 2023-01-01 RX ADMIN — HEPARIN SODIUM 5000 UNITS: 5000 INJECTION, SOLUTION INTRAVENOUS; SUBCUTANEOUS at 21:26

## 2023-01-01 RX ADMIN — HYDROMORPHONE HYDROCHLORIDE 0.5 MG: 1 INJECTION, SOLUTION INTRAMUSCULAR; INTRAVENOUS; SUBCUTANEOUS at 23:40

## 2023-01-01 RX ADMIN — METRONIDAZOLE 500 MG: 500 INJECTION, SOLUTION INTRAVENOUS at 21:44

## 2023-01-01 RX ADMIN — DEXAMETHASONE SODIUM PHOSPHATE 4 MG: 4 INJECTION INTRA-ARTICULAR; INTRALESIONAL; INTRAMUSCULAR; INTRAVENOUS; SOFT TISSUE at 16:36

## 2023-01-01 RX ADMIN — INSULIN HUMAN 1 UNITS: 100 INJECTION, SOLUTION PARENTERAL at 16:54

## 2023-01-01 RX ADMIN — DEXAMETHASONE 2 MG: 1 TABLET ORAL at 05:34

## 2023-01-01 RX ADMIN — HYDROMORPHONE HYDROCHLORIDE 0.5 MG: 1 INJECTION, SOLUTION INTRAMUSCULAR; INTRAVENOUS; SUBCUTANEOUS at 18:22

## 2023-01-01 RX ADMIN — METRONIDAZOLE 500 MG: 500 INJECTION, SOLUTION INTRAVENOUS at 13:53

## 2023-01-01 RX ADMIN — I.V. FAT EMULSION: 20 EMULSION INTRAVENOUS at 21:53

## 2023-01-01 RX ADMIN — METRONIDAZOLE 500 MG: 500 INJECTION, SOLUTION INTRAVENOUS at 06:46

## 2023-01-01 RX ADMIN — Medication 100 MCG: at 10:53

## 2023-01-01 RX ADMIN — MIDAZOLAM 1 MG: 1 INJECTION, SOLUTION INTRAMUSCULAR; INTRAVENOUS at 14:10

## 2023-01-01 RX ADMIN — SODIUM CHLORIDE: 4 INJECTION, SOLUTION, CONCENTRATE INTRAVENOUS at 20:54

## 2023-01-01 RX ADMIN — OXYCODONE HYDROCHLORIDE 10 MG: 10 TABLET, FILM COATED, EXTENDED RELEASE ORAL at 06:50

## 2023-01-01 RX ADMIN — BUPIVACAINE HYDROCHLORIDE 1 ML: 2.5 INJECTION, SOLUTION EPIDURAL; INFILTRATION; INTRACAUDAL at 09:22

## 2023-01-01 RX ADMIN — HYDROMORPHONE HYDROCHLORIDE 0.5 MG: 1 INJECTION, SOLUTION INTRAMUSCULAR; INTRAVENOUS; SUBCUTANEOUS at 17:23

## 2023-01-01 RX ADMIN — ONDANSETRON 4 MG: 2 INJECTION INTRAMUSCULAR; INTRAVENOUS at 14:25

## 2023-01-01 RX ADMIN — LIDOCAINE HYDROCHLORIDE 80 MG: 20 INJECTION, SOLUTION EPIDURAL; INFILTRATION; INTRACAUDAL at 07:50

## 2023-01-01 RX ADMIN — OMEPRAZOLE 20 MG: 20 CAPSULE, DELAYED RELEASE ORAL at 05:26

## 2023-01-01 RX ADMIN — HYDROMORPHONE HYDROCHLORIDE 1 MG: 1 INJECTION, SOLUTION INTRAMUSCULAR; INTRAVENOUS; SUBCUTANEOUS at 09:00

## 2023-01-01 RX ADMIN — FENTANYL CITRATE 50 MCG: 50 INJECTION, SOLUTION INTRAMUSCULAR; INTRAVENOUS at 14:21

## 2023-01-01 RX ADMIN — INSULIN HUMAN 1 UNITS: 100 INJECTION, SOLUTION PARENTERAL at 16:40

## 2023-01-01 RX ADMIN — SODIUM CHLORIDE, POTASSIUM CHLORIDE, SODIUM LACTATE AND CALCIUM CHLORIDE: 600; 310; 30; 20 INJECTION, SOLUTION INTRAVENOUS at 05:29

## 2023-01-01 RX ADMIN — ONDANSETRON 4 MG: 2 INJECTION INTRAMUSCULAR; INTRAVENOUS at 17:33

## 2023-01-01 RX ADMIN — LIDOCAINE 2 PATCH: 4 PATCH TOPICAL at 16:39

## 2023-01-01 RX ADMIN — Medication 50 MCG: at 09:21

## 2023-01-01 RX ADMIN — VANCOMYCIN HYDROCHLORIDE 500 MG: 5 INJECTION, POWDER, LYOPHILIZED, FOR SOLUTION INTRAVENOUS at 23:56

## 2023-01-01 RX ADMIN — MORPHINE SULFATE 4 MG: 4 INJECTION, SOLUTION INTRAMUSCULAR; INTRAVENOUS at 13:45

## 2023-01-01 RX ADMIN — HEPARIN SODIUM 5000 UNITS: 5000 INJECTION, SOLUTION INTRAVENOUS; SUBCUTANEOUS at 06:21

## 2023-01-01 RX ADMIN — ACETAMINOPHEN 975 MG: 325 TABLET, FILM COATED ORAL at 08:53

## 2023-01-01 RX ADMIN — FENTANYL CITRATE 25 MCG: 50 INJECTION, SOLUTION INTRAMUSCULAR; INTRAVENOUS at 13:50

## 2023-01-01 RX ADMIN — HEPARIN SODIUM 5000 UNITS: 5000 INJECTION, SOLUTION INTRAVENOUS; SUBCUTANEOUS at 04:41

## 2023-01-01 RX ADMIN — MORPHINE SULFATE 4 MG: 4 INJECTION, SOLUTION INTRAMUSCULAR; INTRAVENOUS at 01:28

## 2023-01-01 RX ADMIN — Medication 100 MCG: at 10:47

## 2023-01-01 RX ADMIN — HYDROMORPHONE HYDROCHLORIDE 0.5 MG: 1 INJECTION, SOLUTION INTRAMUSCULAR; INTRAVENOUS; SUBCUTANEOUS at 08:47

## 2023-01-01 RX ADMIN — HYDROMORPHONE HYDROCHLORIDE 0.5 MG: 2 INJECTION INTRAMUSCULAR; INTRAVENOUS; SUBCUTANEOUS at 09:57

## 2023-01-01 RX ADMIN — HYDROMORPHONE HYDROCHLORIDE 1 MG: 1 INJECTION, SOLUTION INTRAMUSCULAR; INTRAVENOUS; SUBCUTANEOUS at 16:38

## 2023-01-01 RX ADMIN — HEPARIN SODIUM 5000 UNITS: 5000 INJECTION, SOLUTION INTRAVENOUS; SUBCUTANEOUS at 00:06

## 2023-01-01 RX ADMIN — VANCOMYCIN HYDROCHLORIDE 500 MG: 5 INJECTION, POWDER, LYOPHILIZED, FOR SOLUTION INTRAVENOUS at 12:49

## 2023-01-01 RX ADMIN — Medication 100 MCG: at 10:36

## 2023-01-01 RX ADMIN — MORPHINE SULFATE 4 MG: 4 INJECTION, SOLUTION INTRAMUSCULAR; INTRAVENOUS at 10:57

## 2023-01-01 RX ADMIN — INSULIN HUMAN 2 UNITS: 100 INJECTION, SOLUTION PARENTERAL at 12:47

## 2023-01-01 RX ADMIN — I.V. FAT EMULSION: 20 EMULSION INTRAVENOUS at 20:04

## 2023-01-01 RX ADMIN — SODIUM CHLORIDE, POTASSIUM CHLORIDE, SODIUM LACTATE AND CALCIUM CHLORIDE: 600; 310; 30; 20 INJECTION, SOLUTION INTRAVENOUS at 14:24

## 2023-01-01 RX ADMIN — FENTANYL CITRATE 50 MCG: 50 INJECTION, SOLUTION INTRAMUSCULAR; INTRAVENOUS at 14:25

## 2023-01-01 RX ADMIN — HEPARIN SODIUM 5000 UNITS: 5000 INJECTION, SOLUTION INTRAVENOUS; SUBCUTANEOUS at 20:34

## 2023-01-01 RX ADMIN — DEXAMETHASONE SODIUM PHOSPHATE 4 MG: 4 INJECTION INTRA-ARTICULAR; INTRALESIONAL; INTRAMUSCULAR; INTRAVENOUS; SOFT TISSUE at 05:30

## 2023-01-01 RX ADMIN — MORPHINE SULFATE 4 MG: 4 INJECTION, SOLUTION INTRAMUSCULAR; INTRAVENOUS at 00:00

## 2023-01-01 RX ADMIN — MIDAZOLAM 1 MG: 1 INJECTION, SOLUTION INTRAMUSCULAR; INTRAVENOUS at 11:52

## 2023-01-01 RX ADMIN — PANTOPRAZOLE SODIUM 40 MG: 40 INJECTION, POWDER, FOR SOLUTION INTRAVENOUS at 05:30

## 2023-01-01 RX ADMIN — HEPARIN SODIUM 5000 UNITS: 5000 INJECTION, SOLUTION INTRAVENOUS; SUBCUTANEOUS at 14:28

## 2023-01-01 RX ADMIN — MORPHINE SULFATE 4 MG: 4 INJECTION, SOLUTION INTRAMUSCULAR; INTRAVENOUS at 15:20

## 2023-01-01 RX ADMIN — OXYCODONE HYDROCHLORIDE 5 MG: 5 TABLET ORAL at 19:23

## 2023-01-01 RX ADMIN — Medication 100 MCG: at 11:20

## 2023-01-01 RX ADMIN — HYDROMORPHONE HYDROCHLORIDE 1 MG: 1 INJECTION, SOLUTION INTRAMUSCULAR; INTRAVENOUS; SUBCUTANEOUS at 18:35

## 2023-01-01 RX ADMIN — SODIUM PHOSPHATE 133 ML: 7; 19 ENEMA RECTAL at 16:30

## 2023-01-01 RX ADMIN — MINERAL OIL, AND WHITE PETROLATUM 1 APPLICATION: 425; 573 OINTMENT OPHTHALMIC at 20:15

## 2023-01-01 RX ADMIN — CEFAZOLIN 2 G: 1 INJECTION, POWDER, FOR SOLUTION INTRAMUSCULAR; INTRAVENOUS at 07:53

## 2023-01-01 RX ADMIN — PANTOPRAZOLE SODIUM 40 MG: 40 INJECTION, POWDER, FOR SOLUTION INTRAVENOUS at 05:04

## 2023-01-01 RX ADMIN — BISACODYL 10 MG: 10 SUPPOSITORY RECTAL at 09:00

## 2023-01-01 RX ADMIN — BISACODYL 10 MG: 10 SUPPOSITORY RECTAL at 04:39

## 2023-01-01 RX ADMIN — BISACODYL 10 MG: 10 SUPPOSITORY RECTAL at 12:25

## 2023-01-01 RX ADMIN — POTASSIUM CHLORIDE, DEXTROSE MONOHYDRATE AND SODIUM CHLORIDE: 150; 5; 450 INJECTION, SOLUTION INTRAVENOUS at 15:10

## 2023-01-01 RX ADMIN — DEXAMETHASONE 1 MG: 1 TABLET ORAL at 05:05

## 2023-01-01 RX ADMIN — CEFAZOLIN 2 G: 1 INJECTION, POWDER, FOR SOLUTION INTRAMUSCULAR; INTRAVENOUS at 11:41

## 2023-01-01 RX ADMIN — I.V. FAT EMULSION: 20 EMULSION INTRAVENOUS at 20:12

## 2023-01-01 RX ADMIN — I.V. FAT EMULSION: 20 EMULSION INTRAVENOUS at 20:02

## 2023-01-01 RX ADMIN — VANCOMYCIN HYDROCHLORIDE 500 MG: 5 INJECTION, POWDER, LYOPHILIZED, FOR SOLUTION INTRAVENOUS at 17:05

## 2023-01-01 RX ADMIN — HYDROMORPHONE HYDROCHLORIDE 0.5 MG: 1 INJECTION, SOLUTION INTRAMUSCULAR; INTRAVENOUS; SUBCUTANEOUS at 14:24

## 2023-01-01 RX ADMIN — HEPARIN SODIUM 5000 UNITS: 5000 INJECTION, SOLUTION INTRAVENOUS; SUBCUTANEOUS at 19:32

## 2023-01-01 RX ADMIN — HYDROMORPHONE HYDROCHLORIDE 1 MG: 1 INJECTION, SOLUTION INTRAMUSCULAR; INTRAVENOUS; SUBCUTANEOUS at 09:12

## 2023-01-01 RX ADMIN — HEPARIN SODIUM 5000 UNITS: 5000 INJECTION, SOLUTION INTRAVENOUS; SUBCUTANEOUS at 21:09

## 2023-01-01 RX ADMIN — BUPIVACAINE HYDROCHLORIDE 2 ML: 2.5 INJECTION, SOLUTION EPIDURAL; INFILTRATION; INTRACAUDAL at 08:12

## 2023-01-01 RX ADMIN — HEPARIN SODIUM 5000 UNITS: 5000 INJECTION, SOLUTION INTRAVENOUS; SUBCUTANEOUS at 21:44

## 2023-01-01 RX ADMIN — POTASSIUM CHLORIDE, DEXTROSE MONOHYDRATE AND SODIUM CHLORIDE: 150; 5; 450 INJECTION, SOLUTION INTRAVENOUS at 19:35

## 2023-01-01 RX ADMIN — ACETAMINOPHEN 1000 MG: 500 TABLET ORAL at 09:14

## 2023-01-01 RX ADMIN — DEXAMETHASONE SODIUM PHOSPHATE 4 MG: 4 INJECTION INTRA-ARTICULAR; INTRALESIONAL; INTRAMUSCULAR; INTRAVENOUS; SOFT TISSUE at 14:07

## 2023-01-01 RX ADMIN — ONDANSETRON 4 MG: 2 INJECTION INTRAMUSCULAR; INTRAVENOUS at 11:41

## 2023-01-01 RX ADMIN — SODIUM CHLORIDE, POTASSIUM CHLORIDE, SODIUM LACTATE AND CALCIUM CHLORIDE: 600; 310; 30; 20 INJECTION, SOLUTION INTRAVENOUS at 05:07

## 2023-01-01 RX ADMIN — HYDROMORPHONE HYDROCHLORIDE 1 MG: 1 INJECTION, SOLUTION INTRAMUSCULAR; INTRAVENOUS; SUBCUTANEOUS at 18:09

## 2023-01-01 RX ADMIN — MORPHINE SULFATE 4 MG: 4 INJECTION, SOLUTION INTRAMUSCULAR; INTRAVENOUS at 22:33

## 2023-01-01 RX ADMIN — I.V. FAT EMULSION: 20 EMULSION INTRAVENOUS at 20:39

## 2023-01-01 RX ADMIN — DEXTROSE AND SODIUM CHLORIDE: 5; 900 INJECTION, SOLUTION INTRAVENOUS at 05:16

## 2023-01-01 RX ADMIN — FENTANYL CITRATE 25 MCG: 50 INJECTION, SOLUTION INTRAMUSCULAR; INTRAVENOUS at 14:48

## 2023-01-01 RX ADMIN — PIPERACILLIN AND TAZOBACTAM 3.38 G: 3; .375 INJECTION, POWDER, FOR SOLUTION INTRAVENOUS at 04:44

## 2023-01-01 RX ADMIN — DEXAMETHASONE SODIUM PHOSPHATE 8 MG: 4 INJECTION INTRA-ARTICULAR; INTRALESIONAL; INTRAMUSCULAR; INTRAVENOUS; SOFT TISSUE at 08:00

## 2023-01-01 RX ADMIN — BUPIVACAINE HYDROCHLORIDE 0.5 ML: 2.5 INJECTION, SOLUTION EPIDURAL; INFILTRATION; INTRACAUDAL at 07:36

## 2023-01-01 RX ADMIN — INSULIN HUMAN 1 UNITS: 100 INJECTION, SOLUTION PARENTERAL at 00:04

## 2023-01-01 RX ADMIN — MORPHINE SULFATE 4 MG: 4 INJECTION, SOLUTION INTRAMUSCULAR; INTRAVENOUS at 06:40

## 2023-01-01 RX ADMIN — Medication: at 11:54

## 2023-01-01 RX ADMIN — Medication: at 05:11

## 2023-01-01 RX ADMIN — HYDROMORPHONE HYDROCHLORIDE 0.5 MG: 1 INJECTION, SOLUTION INTRAMUSCULAR; INTRAVENOUS; SUBCUTANEOUS at 20:54

## 2023-01-01 RX ADMIN — HYDROMORPHONE HYDROCHLORIDE 0.5 MG: 1 INJECTION, SOLUTION INTRAMUSCULAR; INTRAVENOUS; SUBCUTANEOUS at 01:45

## 2023-01-01 RX ADMIN — BUPIVACAINE HYDROCHLORIDE 1 ML: 2.5 INJECTION, SOLUTION EPIDURAL; INFILTRATION; INTRACAUDAL at 11:41

## 2023-01-01 RX ADMIN — EPHEDRINE SULFATE 10 MG: 50 INJECTION, SOLUTION INTRAVENOUS at 07:54

## 2023-01-01 RX ADMIN — Medication 100 MCG: at 11:16

## 2023-01-01 RX ADMIN — ACETAMINOPHEN 650 MG: 325 TABLET, FILM COATED ORAL at 17:18

## 2023-01-01 RX ADMIN — HYDROMORPHONE HYDROCHLORIDE 1 MG: 1 INJECTION, SOLUTION INTRAMUSCULAR; INTRAVENOUS; SUBCUTANEOUS at 07:41

## 2023-01-01 RX ADMIN — MORPHINE SULFATE 4 MG: 4 INJECTION, SOLUTION INTRAMUSCULAR; INTRAVENOUS at 19:47

## 2023-01-01 RX ADMIN — Medication: at 12:46

## 2023-01-01 RX ADMIN — Medication: at 06:14

## 2023-01-01 RX ADMIN — ROCURONIUM BROMIDE 50 MG: 50 INJECTION, SOLUTION INTRAVENOUS at 07:50

## 2023-01-01 RX ADMIN — FENTANYL CITRATE 25 MCG: 50 INJECTION, SOLUTION INTRAMUSCULAR; INTRAVENOUS at 11:55

## 2023-01-01 RX ADMIN — HEPARIN SODIUM 5000 UNITS: 5000 INJECTION, SOLUTION INTRAVENOUS; SUBCUTANEOUS at 14:01

## 2023-01-01 RX ADMIN — PROCHLORPERAZINE EDISYLATE 10 MG: 5 INJECTION INTRAMUSCULAR; INTRAVENOUS at 11:17

## 2023-01-01 RX ADMIN — SENNOSIDES AND DOCUSATE SODIUM 2 TABLET: 50; 8.6 TABLET ORAL at 05:38

## 2023-01-01 RX ADMIN — FOLIC ACID 1 MG: 5 INJECTION, SOLUTION INTRAMUSCULAR; INTRAVENOUS; SUBCUTANEOUS at 08:21

## 2023-01-01 RX ADMIN — OMEPRAZOLE 20 MG: 20 CAPSULE, DELAYED RELEASE ORAL at 07:47

## 2023-01-01 RX ADMIN — HYDROMORPHONE HYDROCHLORIDE 1 MG: 1 INJECTION, SOLUTION INTRAMUSCULAR; INTRAVENOUS; SUBCUTANEOUS at 04:14

## 2023-01-01 RX ADMIN — BUPIVACAINE HYDROCHLORIDE 1 ML: 2.5 INJECTION, SOLUTION EPIDURAL; INFILTRATION; INTRACAUDAL at 11:56

## 2023-01-01 RX ADMIN — POTASSIUM PHOSPHATE, MONOBASIC AND POTASSIUM PHOSPHATE, DIBASIC 30 MMOL: 224; 236 INJECTION, SOLUTION, CONCENTRATE INTRAVENOUS at 16:54

## 2023-01-01 RX ADMIN — PIPERACILLIN AND TAZOBACTAM 3.38 G: 3; .375 INJECTION, POWDER, FOR SOLUTION INTRAVENOUS at 05:19

## 2023-01-01 RX ADMIN — I.V. FAT EMULSION: 20 EMULSION INTRAVENOUS at 20:38

## 2023-01-01 RX ADMIN — I.V. FAT EMULSION: 20 EMULSION INTRAVENOUS at 20:59

## 2023-01-01 RX ADMIN — POTASSIUM CHLORIDE, DEXTROSE MONOHYDRATE AND SODIUM CHLORIDE: 150; 5; 450 INJECTION, SOLUTION INTRAVENOUS at 09:24

## 2023-01-01 RX ADMIN — SODIUM CHLORIDE, SODIUM GLUCONATE, SODIUM ACETATE, POTASSIUM CHLORIDE AND MAGNESIUM CHLORIDE: 526; 502; 368; 37; 30 INJECTION, SOLUTION INTRAVENOUS at 09:27

## 2023-01-01 RX ADMIN — PIPERACILLIN AND TAZOBACTAM 3.38 G: 3; .375 INJECTION, POWDER, FOR SOLUTION INTRAVENOUS at 20:27

## 2023-01-01 RX ADMIN — HYDROMORPHONE HYDROCHLORIDE 1 MG: 1 INJECTION, SOLUTION INTRAMUSCULAR; INTRAVENOUS; SUBCUTANEOUS at 20:57

## 2023-01-01 RX ADMIN — I.V. FAT EMULSION: 20 EMULSION INTRAVENOUS at 20:18

## 2023-01-01 RX ADMIN — PANTOPRAZOLE SODIUM 40 MG: 40 INJECTION, POWDER, FOR SOLUTION INTRAVENOUS at 10:13

## 2023-01-01 RX ADMIN — HYDROMORPHONE HYDROCHLORIDE 1 MG: 1 INJECTION, SOLUTION INTRAMUSCULAR; INTRAVENOUS; SUBCUTANEOUS at 21:38

## 2023-01-01 RX ADMIN — Medication 1 APPLICATOR: at 04:17

## 2023-01-01 RX ADMIN — ONDANSETRON 4 MG: 2 INJECTION INTRAMUSCULAR; INTRAVENOUS at 05:33

## 2023-01-01 RX ADMIN — Medication 1 APPLICATOR: at 06:00

## 2023-01-01 RX ADMIN — TRAMADOL HYDROCHLORIDE 50 MG: 50 TABLET ORAL at 00:38

## 2023-01-01 RX ADMIN — POTASSIUM CHLORIDE, DEXTROSE MONOHYDRATE AND SODIUM CHLORIDE: 150; 5; 450 INJECTION, SOLUTION INTRAVENOUS at 11:08

## 2023-01-01 RX ADMIN — FENTANYL CITRATE 50 MCG: 50 INJECTION, SOLUTION INTRAMUSCULAR; INTRAVENOUS at 14:10

## 2023-01-01 RX ADMIN — Medication: at 08:55

## 2023-01-01 RX ADMIN — HYDROMORPHONE HYDROCHLORIDE 1 MG: 1 INJECTION, SOLUTION INTRAMUSCULAR; INTRAVENOUS; SUBCUTANEOUS at 06:02

## 2023-01-01 RX ADMIN — MIDAZOLAM 2 MG: 1 INJECTION, SOLUTION INTRAMUSCULAR; INTRAVENOUS at 11:38

## 2023-01-01 RX ADMIN — MIDAZOLAM 1 MG: 1 INJECTION, SOLUTION INTRAMUSCULAR; INTRAVENOUS at 14:21

## 2023-01-01 RX ADMIN — MEGESTROL ACETATE 800 MG: 40 SUSPENSION ORAL at 05:32

## 2023-01-01 RX ADMIN — HYDROMORPHONE HYDROCHLORIDE 1 MG: 1 INJECTION, SOLUTION INTRAMUSCULAR; INTRAVENOUS; SUBCUTANEOUS at 13:33

## 2023-01-01 RX ADMIN — SODIUM CHLORIDE, POTASSIUM CHLORIDE, SODIUM LACTATE AND CALCIUM CHLORIDE: 600; 310; 30; 20 INJECTION, SOLUTION INTRAVENOUS at 01:26

## 2023-01-01 RX ADMIN — CARBOXYMETHYLCELLULOSE SODIUM 1 DROP: 5 SOLUTION/ DROPS OPHTHALMIC at 17:30

## 2023-01-01 RX ADMIN — Medication 1 APPLICATOR: at 18:47

## 2023-01-01 RX ADMIN — MEGESTROL ACETATE 800 MG: 40 SUSPENSION ORAL at 12:53

## 2023-01-01 RX ADMIN — HEPARIN SODIUM 5000 UNITS: 5000 INJECTION, SOLUTION INTRAVENOUS; SUBCUTANEOUS at 14:50

## 2023-01-01 RX ADMIN — FENTANYL CITRATE 50 MCG: 50 INJECTION, SOLUTION INTRAMUSCULAR; INTRAVENOUS at 11:45

## 2023-01-01 RX ADMIN — INSULIN HUMAN 2 UNITS: 100 INJECTION, SOLUTION PARENTERAL at 00:09

## 2023-01-01 RX ADMIN — ONDANSETRON 4 MG: 2 INJECTION INTRAMUSCULAR; INTRAVENOUS at 18:32

## 2023-01-01 RX ADMIN — IOHEXOL 300 ML: 300 INJECTION, SOLUTION INTRAVENOUS at 16:45

## 2023-01-01 RX ADMIN — DEXTROSE AND SODIUM CHLORIDE: 5; 900 INJECTION, SOLUTION INTRAVENOUS at 12:34

## 2023-01-01 RX ADMIN — VANCOMYCIN HYDROCHLORIDE 500 MG: 5 INJECTION, POWDER, LYOPHILIZED, FOR SOLUTION INTRAVENOUS at 11:23

## 2023-01-01 RX ADMIN — METRONIDAZOLE 500 MG: 500 INJECTION, SOLUTION INTRAVENOUS at 06:35

## 2023-01-01 RX ADMIN — HYDROMORPHONE HYDROCHLORIDE 1 MG: 1 INJECTION, SOLUTION INTRAMUSCULAR; INTRAVENOUS; SUBCUTANEOUS at 01:24

## 2023-01-01 RX ADMIN — LIDOCAINE HYDROCHLORIDE: 10 INJECTION, SOLUTION INFILTRATION; PERINEURAL at 14:00

## 2023-01-01 RX ADMIN — PIPERACILLIN AND TAZOBACTAM 3.38 G: 3; .375 INJECTION, POWDER, FOR SOLUTION INTRAVENOUS at 20:25

## 2023-01-01 RX ADMIN — ONDANSETRON 4 MG: 2 INJECTION INTRAMUSCULAR; INTRAVENOUS at 07:48

## 2023-01-01 RX ADMIN — IOHEXOL 100 ML: 350 INJECTION, SOLUTION INTRAVENOUS at 19:46

## 2023-01-01 RX ADMIN — MORPHINE SULFATE 4 MG: 4 INJECTION, SOLUTION INTRAMUSCULAR; INTRAVENOUS at 02:13

## 2023-01-01 RX ADMIN — HEPARIN SODIUM 5000 UNITS: 5000 INJECTION, SOLUTION INTRAVENOUS; SUBCUTANEOUS at 05:00

## 2023-01-01 RX ADMIN — I.V. FAT EMULSION: 20 EMULSION INTRAVENOUS at 20:05

## 2023-01-01 RX ADMIN — HYDROMORPHONE HYDROCHLORIDE 1 MG: 1 INJECTION, SOLUTION INTRAMUSCULAR; INTRAVENOUS; SUBCUTANEOUS at 23:17

## 2023-01-01 RX ADMIN — HEPARIN SODIUM 5000 UNITS: 5000 INJECTION, SOLUTION INTRAVENOUS; SUBCUTANEOUS at 06:40

## 2023-01-01 RX ADMIN — HEPARIN SODIUM 5000 UNITS: 5000 INJECTION, SOLUTION INTRAVENOUS; SUBCUTANEOUS at 05:37

## 2023-01-01 RX ADMIN — SODIUM CHLORIDE, SODIUM GLUCONATE, SODIUM ACETATE, POTASSIUM CHLORIDE AND MAGNESIUM CHLORIDE: 526; 502; 368; 37; 30 INJECTION, SOLUTION INTRAVENOUS at 07:59

## 2023-01-01 RX ADMIN — SODIUM CHLORIDE: 9 INJECTION, SOLUTION INTRAVENOUS at 12:04

## 2023-01-01 RX ADMIN — I.V. FAT EMULSION: 20 EMULSION INTRAVENOUS at 20:22

## 2023-01-01 RX ADMIN — METRONIDAZOLE 500 MG: 500 INJECTION, SOLUTION INTRAVENOUS at 05:21

## 2023-01-01 RX ADMIN — POTASSIUM CHLORIDE, DEXTROSE MONOHYDRATE AND SODIUM CHLORIDE: 150; 5; 450 INJECTION, SOLUTION INTRAVENOUS at 22:09

## 2023-01-01 RX ADMIN — PIPERACILLIN AND TAZOBACTAM 3.38 G: 3; .375 INJECTION, POWDER, FOR SOLUTION INTRAVENOUS at 14:32

## 2023-01-01 RX ADMIN — INSULIN HUMAN 2 UNITS: 100 INJECTION, SOLUTION PARENTERAL at 12:12

## 2023-01-01 RX ADMIN — FENTANYL CITRATE 25 MCG: 50 INJECTION, SOLUTION INTRAMUSCULAR; INTRAVENOUS at 12:54

## 2023-01-01 RX ADMIN — HEPARIN SODIUM 5000 UNITS: 5000 INJECTION, SOLUTION INTRAVENOUS; SUBCUTANEOUS at 14:07

## 2023-01-01 RX ADMIN — FAMOTIDINE 10 MG: 20 TABLET, FILM COATED ORAL at 18:44

## 2023-01-01 RX ADMIN — Medication 50 MCG: at 09:15

## 2023-01-01 RX ADMIN — DEXAMETHASONE SODIUM PHOSPHATE 4 MG: 4 INJECTION INTRA-ARTICULAR; INTRALESIONAL; INTRAMUSCULAR; INTRAVENOUS; SOFT TISSUE at 17:43

## 2023-01-01 RX ADMIN — OXYCODONE HYDROCHLORIDE 5 MG: 5 TABLET ORAL at 09:13

## 2023-01-01 RX ADMIN — HYDROMORPHONE HYDROCHLORIDE 0.5 MG: 1 INJECTION, SOLUTION INTRAMUSCULAR; INTRAVENOUS; SUBCUTANEOUS at 04:59

## 2023-01-01 RX ADMIN — Medication: at 10:53

## 2023-01-01 RX ADMIN — HEPARIN SODIUM 5000 UNITS: 5000 INJECTION, SOLUTION INTRAVENOUS; SUBCUTANEOUS at 04:39

## 2023-01-01 RX ADMIN — ONDANSETRON 4 MG: 2 INJECTION INTRAMUSCULAR; INTRAVENOUS at 10:07

## 2023-01-01 RX ADMIN — LORAZEPAM 1 MG: 2 INJECTION, SOLUTION INTRAMUSCULAR; INTRAVENOUS at 12:48

## 2023-01-01 RX ADMIN — HYDROMORPHONE HYDROCHLORIDE 0.5 MG: 1 INJECTION, SOLUTION INTRAMUSCULAR; INTRAVENOUS; SUBCUTANEOUS at 12:39

## 2023-01-01 RX ADMIN — PANTOPRAZOLE SODIUM 40 MG: 40 INJECTION, POWDER, FOR SOLUTION INTRAVENOUS at 05:27

## 2023-01-01 RX ADMIN — SODIUM PHOSPHATE, MONOBASIC, MONOHYDRATE AND SODIUM PHOSPHATE, DIBASIC, ANHYDROUS 30 MMOL: 142; 276 INJECTION, SOLUTION INTRAVENOUS at 13:31

## 2023-01-01 RX ADMIN — PANTOPRAZOLE SODIUM 40 MG: 40 INJECTION, POWDER, FOR SOLUTION INTRAVENOUS at 04:39

## 2023-01-01 RX ADMIN — CEFAZOLIN 2 G: 1 INJECTION, POWDER, FOR SOLUTION INTRAMUSCULAR; INTRAVENOUS at 14:45

## 2023-01-01 RX ADMIN — HEPARIN SODIUM 5000 UNITS: 5000 INJECTION, SOLUTION INTRAVENOUS; SUBCUTANEOUS at 22:06

## 2023-01-01 RX ADMIN — MIDAZOLAM HYDROCHLORIDE 2 MG: 1 INJECTION INTRAMUSCULAR; INTRAVENOUS at 11:38

## 2023-01-01 RX ADMIN — POTASSIUM CHLORIDE, DEXTROSE MONOHYDRATE AND SODIUM CHLORIDE: 150; 5; 450 INJECTION, SOLUTION INTRAVENOUS at 19:36

## 2023-01-01 RX ADMIN — PIPERACILLIN AND TAZOBACTAM 3.38 G: 3; .375 INJECTION, POWDER, FOR SOLUTION INTRAVENOUS at 09:01

## 2023-01-01 RX ADMIN — HEPARIN SODIUM 5000 UNITS: 5000 INJECTION, SOLUTION INTRAVENOUS; SUBCUTANEOUS at 06:02

## 2023-01-01 RX ADMIN — DEXTROSE AND SODIUM CHLORIDE: 5; 900 INJECTION, SOLUTION INTRAVENOUS at 21:48

## 2023-01-01 RX ADMIN — ACETAMINOPHEN 650 MG: 325 TABLET, FILM COATED ORAL at 09:23

## 2023-01-01 RX ADMIN — BUPIVACAINE HYDROCHLORIDE 1 ML: 2.5 INJECTION, SOLUTION EPIDURAL; INFILTRATION; INTRACAUDAL at 11:24

## 2023-01-01 RX ADMIN — SODIUM CHLORIDE, POTASSIUM CHLORIDE, SODIUM LACTATE AND CALCIUM CHLORIDE: 600; 310; 30; 20 INJECTION, SOLUTION INTRAVENOUS at 08:49

## 2023-01-01 RX ADMIN — DIBASIC SODIUM PHOSPHATE, MONOBASIC POTASSIUM PHOSPHATE AND MONOBASIC SODIUM PHOSPHATE 250 MG: 852; 155; 130 TABLET ORAL at 07:47

## 2023-01-01 RX ADMIN — VANCOMYCIN HYDROCHLORIDE 500 MG: 5 INJECTION, POWDER, LYOPHILIZED, FOR SOLUTION INTRAVENOUS at 17:52

## 2023-01-01 RX ADMIN — HYDROMORPHONE HYDROCHLORIDE 1 MG: 1 INJECTION, SOLUTION INTRAMUSCULAR; INTRAVENOUS; SUBCUTANEOUS at 14:16

## 2023-01-01 RX ADMIN — SODIUM CHLORIDE: 4 INJECTION, SOLUTION, CONCENTRATE INTRAVENOUS at 10:17

## 2023-01-01 RX ADMIN — HYDROMORPHONE HYDROCHLORIDE 0.5 MG: 1 INJECTION, SOLUTION INTRAMUSCULAR; INTRAVENOUS; SUBCUTANEOUS at 08:16

## 2023-01-01 RX ADMIN — HEPARIN SODIUM 5000 UNITS: 5000 INJECTION, SOLUTION INTRAVENOUS; SUBCUTANEOUS at 17:03

## 2023-01-01 RX ADMIN — METRONIDAZOLE 500 MG: 500 INJECTION, SOLUTION INTRAVENOUS at 13:49

## 2023-01-01 RX ADMIN — DEXTROSE AND SODIUM CHLORIDE: 5; 900 INJECTION, SOLUTION INTRAVENOUS at 20:42

## 2023-01-01 RX ADMIN — BUPIVACAINE HYDROCHLORIDE 1 ML: 2.5 INJECTION, SOLUTION EPIDURAL; INFILTRATION; INTRACAUDAL at 09:52

## 2023-01-01 RX ADMIN — LORAZEPAM 1 MG: 2 INJECTION, SOLUTION INTRAMUSCULAR; INTRAVENOUS at 16:52

## 2023-01-01 RX ADMIN — ROCURONIUM BROMIDE 10 MG: 50 INJECTION, SOLUTION INTRAVENOUS at 08:20

## 2023-01-01 RX ADMIN — HEPARIN SODIUM 5000 UNITS: 5000 INJECTION, SOLUTION INTRAVENOUS; SUBCUTANEOUS at 20:54

## 2023-01-01 RX ADMIN — HYDROMORPHONE HYDROCHLORIDE: 1 INJECTION, SOLUTION INTRAMUSCULAR; INTRAVENOUS; SUBCUTANEOUS at 12:07

## 2023-01-01 RX ADMIN — BUPIVACAINE HYDROCHLORIDE 1 ML: 2.5 INJECTION, SOLUTION EPIDURAL; INFILTRATION; INTRACAUDAL at 08:31

## 2023-01-01 RX ADMIN — MIDAZOLAM 2 MG: 1 INJECTION, SOLUTION INTRAMUSCULAR; INTRAVENOUS at 11:44

## 2023-01-01 RX ADMIN — PROCHLORPERAZINE EDISYLATE 10 MG: 5 INJECTION INTRAMUSCULAR; INTRAVENOUS at 11:18

## 2023-01-01 RX ADMIN — OXYCODONE HYDROCHLORIDE 10 MG: 10 TABLET ORAL at 02:39

## 2023-01-01 RX ADMIN — VANCOMYCIN HYDROCHLORIDE 500 MG: 5 INJECTION, POWDER, LYOPHILIZED, FOR SOLUTION INTRAVENOUS at 14:43

## 2023-01-01 RX ADMIN — SODIUM CHLORIDE, POTASSIUM CHLORIDE, SODIUM LACTATE AND CALCIUM CHLORIDE: 600; 310; 30; 20 INJECTION, SOLUTION INTRAVENOUS at 13:54

## 2023-01-01 RX ADMIN — FENTANYL CITRATE 100 MCG: 50 INJECTION, SOLUTION INTRAMUSCULAR; INTRAVENOUS at 08:03

## 2023-01-01 RX ADMIN — ROCURONIUM BROMIDE 10 MG: 50 INJECTION, SOLUTION INTRAVENOUS at 09:31

## 2023-01-01 RX ADMIN — CEFAZOLIN: 1 INJECTION, POWDER, FOR SOLUTION INTRAMUSCULAR; INTRAVENOUS at 14:15

## 2023-01-01 RX ADMIN — HYDROMORPHONE HYDROCHLORIDE 1 MG: 1 INJECTION, SOLUTION INTRAMUSCULAR; INTRAVENOUS; SUBCUTANEOUS at 01:06

## 2023-01-01 RX ADMIN — BUPIVACAINE HYDROCHLORIDE 1 ML: 2.5 INJECTION, SOLUTION EPIDURAL; INFILTRATION; INTRACAUDAL at 09:59

## 2023-01-01 RX ADMIN — INSULIN HUMAN 2 UNITS: 100 INJECTION, SOLUTION PARENTERAL at 12:38

## 2023-01-01 RX ADMIN — EPHEDRINE SULFATE 5 MG: 50 INJECTION, SOLUTION INTRAVENOUS at 08:54

## 2023-01-01 RX ADMIN — HYDROMORPHONE HYDROCHLORIDE 1 MG: 1 INJECTION, SOLUTION INTRAMUSCULAR; INTRAVENOUS; SUBCUTANEOUS at 11:57

## 2023-01-01 RX ADMIN — MEGESTROL ACETATE 800 MG: 40 SUSPENSION ORAL at 06:02

## 2023-01-01 RX ADMIN — SODIUM CHLORIDE, POTASSIUM CHLORIDE, SODIUM LACTATE AND CALCIUM CHLORIDE: 600; 310; 30; 20 INJECTION, SOLUTION INTRAVENOUS at 12:47

## 2023-01-01 RX ADMIN — ACETAMINOPHEN 1000 MG: 500 TABLET ORAL at 12:37

## 2023-01-01 RX ADMIN — PANTOPRAZOLE SODIUM 40 MG: 40 INJECTION, POWDER, FOR SOLUTION INTRAVENOUS at 05:26

## 2023-01-01 RX ADMIN — FENTANYL CITRATE 50 MCG: 50 INJECTION, SOLUTION INTRAMUSCULAR; INTRAVENOUS at 07:33

## 2023-01-01 RX ADMIN — DIBASIC SODIUM PHOSPHATE, MONOBASIC POTASSIUM PHOSPHATE AND MONOBASIC SODIUM PHOSPHATE 250 MG: 852; 155; 130 TABLET ORAL at 12:37

## 2023-01-01 RX ADMIN — DEXAMETHASONE 2 MG: 1 TABLET ORAL at 06:03

## 2023-01-01 RX ADMIN — SODIUM CHLORIDE, POTASSIUM CHLORIDE, SODIUM LACTATE AND CALCIUM CHLORIDE: 600; 310; 30; 20 INJECTION, SOLUTION INTRAVENOUS at 07:22

## 2023-01-01 RX ADMIN — POTASSIUM CHLORIDE, DEXTROSE MONOHYDRATE AND SODIUM CHLORIDE: 150; 5; 450 INJECTION, SOLUTION INTRAVENOUS at 14:15

## 2023-01-01 RX ADMIN — DEXAMETHASONE SODIUM PHOSPHATE 4 MG: 4 INJECTION INTRA-ARTICULAR; INTRALESIONAL; INTRAMUSCULAR; INTRAVENOUS; SOFT TISSUE at 06:21

## 2023-01-01 RX ADMIN — PROCHLORPERAZINE MALEATE 10 MG: 10 TABLET ORAL at 09:45

## 2023-01-01 RX ADMIN — HYDROMORPHONE HYDROCHLORIDE 1 MG: 1 INJECTION, SOLUTION INTRAMUSCULAR; INTRAVENOUS; SUBCUTANEOUS at 02:32

## 2023-01-01 RX ADMIN — PANTOPRAZOLE SODIUM 40 MG: 40 INJECTION, POWDER, FOR SOLUTION INTRAVENOUS at 05:34

## 2023-01-01 RX ADMIN — DEXAMETHASONE SODIUM PHOSPHATE 4 MG: 4 INJECTION INTRA-ARTICULAR; INTRALESIONAL; INTRAMUSCULAR; INTRAVENOUS; SOFT TISSUE at 06:18

## 2023-01-01 RX ADMIN — TRAMADOL HYDROCHLORIDE 50 MG: 50 TABLET ORAL at 18:44

## 2023-01-01 RX ADMIN — VANCOMYCIN HYDROCHLORIDE 125 MG: 5 INJECTION, POWDER, LYOPHILIZED, FOR SOLUTION INTRAVENOUS at 04:33

## 2023-01-01 RX ADMIN — HEPARIN SODIUM 5000 UNITS: 5000 INJECTION, SOLUTION INTRAVENOUS; SUBCUTANEOUS at 13:31

## 2023-01-01 RX ADMIN — HEPARIN SODIUM 5000 UNITS: 5000 INJECTION, SOLUTION INTRAVENOUS; SUBCUTANEOUS at 13:50

## 2023-01-01 RX ADMIN — EPHEDRINE SULFATE 5 MG: 50 INJECTION, SOLUTION INTRAVENOUS at 08:24

## 2023-01-01 RX ADMIN — SODIUM CHLORIDE, POTASSIUM CHLORIDE, SODIUM LACTATE AND CALCIUM CHLORIDE: 600; 310; 30; 20 INJECTION, SOLUTION INTRAVENOUS at 06:47

## 2023-01-01 RX ADMIN — CARBOXYMETHYLCELLULOSE SODIUM 1 DROP: 5 SOLUTION/ DROPS OPHTHALMIC at 20:17

## 2023-01-01 RX ADMIN — TRAMADOL HYDROCHLORIDE 50 MG: 50 TABLET ORAL at 12:49

## 2023-01-01 RX ADMIN — HYDROMORPHONE HYDROCHLORIDE 0.5 MG: 1 INJECTION, SOLUTION INTRAMUSCULAR; INTRAVENOUS; SUBCUTANEOUS at 03:12

## 2023-01-01 RX ADMIN — ONDANSETRON 4 MG: 2 INJECTION INTRAMUSCULAR; INTRAVENOUS at 13:27

## 2023-01-01 RX ADMIN — DEXAMETHASONE SODIUM PHOSPHATE 4 MG: 4 INJECTION INTRA-ARTICULAR; INTRALESIONAL; INTRAMUSCULAR; INTRAVENOUS; SOFT TISSUE at 13:33

## 2023-01-01 RX ADMIN — LIDOCAINE HYDROCHLORIDE,EPINEPHRINE BITARTRATE 3 ML: 15; .005 INJECTION, SOLUTION EPIDURAL; INFILTRATION; INTRACAUDAL; PERINEURAL at 07:40

## 2023-01-01 RX ADMIN — HEPARIN SODIUM 5000 UNITS: 5000 INJECTION, SOLUTION INTRAVENOUS; SUBCUTANEOUS at 13:33

## 2023-01-01 RX ADMIN — SCOPOLAMINE 1 PATCH: 1.5 PATCH, EXTENDED RELEASE TRANSDERMAL at 17:29

## 2023-01-01 RX ADMIN — BUPIVACAINE HYDROCHLORIDE 1 ML: 2.5 INJECTION, SOLUTION EPIDURAL; INFILTRATION; INTRACAUDAL at 09:10

## 2023-01-01 RX ADMIN — PROPOFOL 200 MG: 10 INJECTION, EMULSION INTRAVENOUS at 07:50

## 2023-01-01 RX ADMIN — MORPHINE SULFATE 4 MG: 4 INJECTION, SOLUTION INTRAMUSCULAR; INTRAVENOUS at 19:27

## 2023-01-01 RX ADMIN — OXYCODONE HYDROCHLORIDE 10 MG: 10 TABLET ORAL at 00:26

## 2023-01-01 RX ADMIN — MIDAZOLAM 1 MG: 1 INJECTION, SOLUTION INTRAMUSCULAR; INTRAVENOUS at 07:34

## 2023-01-01 RX ADMIN — HYDROMORPHONE HYDROCHLORIDE 0.5 MG: 2 INJECTION INTRAMUSCULAR; INTRAVENOUS; SUBCUTANEOUS at 08:07

## 2023-01-01 RX ADMIN — IOHEXOL 40 ML: 300 INJECTION, SOLUTION INTRAVENOUS at 15:15

## 2023-01-01 RX ADMIN — DOCUSATE SODIUM 100 MG: 100 CAPSULE, LIQUID FILLED ORAL at 17:57

## 2023-01-01 RX ADMIN — HYDROMORPHONE HYDROCHLORIDE 1 MG: 1 INJECTION, SOLUTION INTRAMUSCULAR; INTRAVENOUS; SUBCUTANEOUS at 15:04

## 2023-01-01 RX ADMIN — HEPARIN SODIUM 5000 UNITS: 5000 INJECTION, SOLUTION INTRAVENOUS; SUBCUTANEOUS at 06:03

## 2023-01-01 RX ADMIN — Medication 1 APPLICATOR: at 17:33

## 2023-01-01 RX ADMIN — FOLIC ACID 1 MG: 5 INJECTION, SOLUTION INTRAMUSCULAR; INTRAVENOUS; SUBCUTANEOUS at 12:02

## 2023-01-01 RX ADMIN — PROCHLORPERAZINE MALEATE 10 MG: 10 TABLET ORAL at 09:14

## 2023-01-01 RX ADMIN — SENNOSIDES AND DOCUSATE SODIUM 2 TABLET: 50; 8.6 TABLET ORAL at 18:27

## 2023-01-01 RX ADMIN — FAMOTIDINE 10 MG: 20 TABLET, FILM COATED ORAL at 17:57

## 2023-01-01 RX ADMIN — DEXTROSE AND SODIUM CHLORIDE: 5; 900 INJECTION, SOLUTION INTRAVENOUS at 02:03

## 2023-01-01 RX ADMIN — VANCOMYCIN HYDROCHLORIDE 125 MG: 5 INJECTION, POWDER, LYOPHILIZED, FOR SOLUTION INTRAVENOUS at 00:29

## 2023-01-01 RX ADMIN — HYDROMORPHONE HYDROCHLORIDE 1 MG: 1 INJECTION, SOLUTION INTRAMUSCULAR; INTRAVENOUS; SUBCUTANEOUS at 22:06

## 2023-01-01 RX ADMIN — ACETAMINOPHEN 1000 MG: 500 TABLET, FILM COATED ORAL at 06:50

## 2023-01-01 RX ADMIN — ACETAMINOPHEN 1000 MG: 500 TABLET ORAL at 02:07

## 2023-01-01 RX ADMIN — ATROPINE SULFATE 2 DROP: 10 SOLUTION/ DROPS OPHTHALMIC at 21:45

## 2023-01-01 RX ADMIN — ONDANSETRON 4 MG: 2 INJECTION INTRAMUSCULAR; INTRAVENOUS at 07:55

## 2023-01-01 RX ADMIN — LORAZEPAM 1 MG: 2 INJECTION, SOLUTION INTRAMUSCULAR; INTRAVENOUS at 04:49

## 2023-01-01 RX ADMIN — BUPIVACAINE HYDROCHLORIDE 1 ML: 2.5 INJECTION, SOLUTION EPIDURAL; INFILTRATION; INTRACAUDAL at 07:29

## 2023-01-01 RX ADMIN — MIDAZOLAM 1 MG: 1 INJECTION, SOLUTION INTRAMUSCULAR; INTRAVENOUS at 07:32

## 2023-01-01 RX ADMIN — SUGAMMADEX 200 MG: 100 INJECTION, SOLUTION INTRAVENOUS at 11:41

## 2023-01-01 RX ADMIN — Medication 1 APPLICATOR: at 04:34

## 2023-01-01 RX ADMIN — Medication: at 16:17

## 2023-01-01 RX ADMIN — THIAMINE HYDROCHLORIDE 100 MG: 100 INJECTION, SOLUTION INTRAMUSCULAR; INTRAVENOUS at 06:32

## 2023-01-01 RX ADMIN — FENTANYL CITRATE 25 MCG: 50 INJECTION, SOLUTION INTRAMUSCULAR; INTRAVENOUS at 11:51

## 2023-01-01 RX ADMIN — POTASSIUM CHLORIDE, DEXTROSE MONOHYDRATE AND SODIUM CHLORIDE: 150; 5; 450 INJECTION, SOLUTION INTRAVENOUS at 03:18

## 2023-01-01 RX ADMIN — LORAZEPAM 1 MG: 2 INJECTION, SOLUTION INTRAMUSCULAR; INTRAVENOUS at 17:48

## 2023-01-01 RX ADMIN — Medication 50 MCG: at 09:06

## 2023-01-01 RX ADMIN — DEXAMETHASONE 2 MG: 1 TABLET ORAL at 14:20

## 2023-01-01 RX ADMIN — OXYCODONE HYDROCHLORIDE 5 MG: 5 TABLET ORAL at 04:47

## 2023-01-01 RX ADMIN — THIAMINE HYDROCHLORIDE 100 MG: 100 INJECTION, SOLUTION INTRAMUSCULAR; INTRAVENOUS at 06:34

## 2023-01-01 RX ADMIN — HEPARIN SODIUM 5000 UNITS: 5000 INJECTION, SOLUTION INTRAVENOUS; SUBCUTANEOUS at 05:30

## 2023-01-01 RX ADMIN — I.V. FAT EMULSION: 20 EMULSION INTRAVENOUS at 21:07

## 2023-01-01 RX ADMIN — ROCURONIUM BROMIDE 10 MG: 50 INJECTION, SOLUTION INTRAVENOUS at 10:43

## 2023-01-01 RX ADMIN — PIPERACILLIN AND TAZOBACTAM 3.38 G: 3; .375 INJECTION, POWDER, FOR SOLUTION INTRAVENOUS at 20:34

## 2023-01-01 RX ADMIN — POTASSIUM CHLORIDE, DEXTROSE MONOHYDRATE AND SODIUM CHLORIDE: 150; 5; 450 INJECTION, SOLUTION INTRAVENOUS at 23:07

## 2023-01-01 RX ADMIN — ACETAMINOPHEN 1000 MG: 500 TABLET ORAL at 19:31

## 2023-01-01 RX ADMIN — POTASSIUM CHLORIDE, DEXTROSE MONOHYDRATE AND SODIUM CHLORIDE: 150; 5; 450 INJECTION, SOLUTION INTRAVENOUS at 02:12

## 2023-01-01 RX ADMIN — VANCOMYCIN HYDROCHLORIDE 125 MG: 5 INJECTION, POWDER, LYOPHILIZED, FOR SOLUTION INTRAVENOUS at 02:27

## 2023-01-01 RX ADMIN — SODIUM CHLORIDE, POTASSIUM CHLORIDE, SODIUM LACTATE AND CALCIUM CHLORIDE 500 ML: 600; 310; 30; 20 INJECTION, SOLUTION INTRAVENOUS at 01:55

## 2023-01-01 RX ADMIN — OXYCODONE HYDROCHLORIDE 5 MG: 5 TABLET ORAL at 10:43

## 2023-01-01 RX ADMIN — PROMETHAZINE HYDROCHLORIDE 25 MG: 25 SUPPOSITORY RECTAL at 11:30

## 2023-01-01 RX ADMIN — SODIUM CHLORIDE: 9 INJECTION, SOLUTION INTRAVENOUS at 07:32

## 2023-01-01 RX ADMIN — DEXAMETHASONE SODIUM PHOSPHATE 4 MG: 4 INJECTION INTRA-ARTICULAR; INTRALESIONAL; INTRAMUSCULAR; INTRAVENOUS; SOFT TISSUE at 07:41

## 2023-01-01 RX ADMIN — ACETAMINOPHEN 650 MG: 325 TABLET, FILM COATED ORAL at 18:00

## 2023-01-01 RX ADMIN — ONDANSETRON 4 MG: 2 INJECTION INTRAMUSCULAR; INTRAVENOUS at 16:30

## 2023-01-01 RX ADMIN — SENNOSIDES AND DOCUSATE SODIUM 2 TABLET: 50; 8.6 TABLET ORAL at 17:07

## 2023-01-01 RX ADMIN — BISACODYL 10 MG: 10 SUPPOSITORY RECTAL at 05:04

## 2023-01-01 RX ADMIN — POTASSIUM PHOSPHATE, MONOBASIC POTASSIUM PHOSPHATE, DIBASIC 15 MMOL: 224; 236 INJECTION, SOLUTION, CONCENTRATE INTRAVENOUS at 11:57

## 2023-01-01 RX ADMIN — MORPHINE SULFATE 4 MG: 4 INJECTION, SOLUTION INTRAMUSCULAR; INTRAVENOUS at 08:05

## 2023-01-01 RX ADMIN — INSULIN HUMAN 2 UNITS: 100 INJECTION, SOLUTION PARENTERAL at 05:44

## 2023-01-01 RX ADMIN — MORPHINE SULFATE 4 MG: 4 INJECTION, SOLUTION INTRAMUSCULAR; INTRAVENOUS at 12:43

## 2023-01-01 RX ADMIN — DEXTROSE AND SODIUM CHLORIDE: 5; 900 INJECTION, SOLUTION INTRAVENOUS at 16:03

## 2023-01-01 RX ADMIN — HEPARIN SODIUM 5000 UNITS: 5000 INJECTION, SOLUTION INTRAVENOUS; SUBCUTANEOUS at 21:38

## 2023-01-01 RX ADMIN — Medication 1 APPLICATOR: at 17:57

## 2023-01-01 RX ADMIN — ROCURONIUM BROMIDE 10 MG: 50 INJECTION, SOLUTION INTRAVENOUS at 09:06

## 2023-01-01 RX ADMIN — PROCHLORPERAZINE EDISYLATE 10 MG: 5 INJECTION INTRAMUSCULAR; INTRAVENOUS at 20:09

## 2023-01-01 RX ADMIN — MIDAZOLAM HYDROCHLORIDE 2 MG: 1 INJECTION INTRAMUSCULAR; INTRAVENOUS at 11:44

## 2023-01-01 RX ADMIN — HEPARIN SODIUM 5000 UNITS: 5000 INJECTION, SOLUTION INTRAVENOUS; SUBCUTANEOUS at 22:03

## 2023-01-01 RX ADMIN — HEPARIN SODIUM 5000 UNITS: 5000 INJECTION, SOLUTION INTRAVENOUS; SUBCUTANEOUS at 04:14

## 2023-01-01 RX ADMIN — DEXAMETHASONE SODIUM PHOSPHATE 4 MG: 4 INJECTION INTRA-ARTICULAR; INTRALESIONAL; INTRAMUSCULAR; INTRAVENOUS; SOFT TISSUE at 19:34

## 2023-01-01 RX ADMIN — HYDROMORPHONE HYDROCHLORIDE 1 MG: 1 INJECTION, SOLUTION INTRAMUSCULAR; INTRAVENOUS; SUBCUTANEOUS at 01:39

## 2023-01-01 RX ADMIN — HEPARIN SODIUM 5000 UNITS: 5000 INJECTION, SOLUTION INTRAVENOUS; SUBCUTANEOUS at 06:01

## 2023-01-01 RX ADMIN — INSULIN HUMAN 1 UNITS: 100 INJECTION, SOLUTION PARENTERAL at 23:55

## 2023-01-01 RX ADMIN — OXYCODONE HYDROCHLORIDE 10 MG: 10 TABLET ORAL at 15:59

## 2023-01-01 RX ADMIN — FENTANYL CITRATE 50 MCG: 50 INJECTION, SOLUTION INTRAMUSCULAR; INTRAVENOUS at 11:38

## 2023-01-01 RX ADMIN — SODIUM CHLORIDE, POTASSIUM CHLORIDE, SODIUM LACTATE AND CALCIUM CHLORIDE: 600; 310; 30; 20 INJECTION, SOLUTION INTRAVENOUS at 01:18

## 2023-01-01 RX ADMIN — HYDROMORPHONE HYDROCHLORIDE 1 MG: 1 INJECTION, SOLUTION INTRAMUSCULAR; INTRAVENOUS; SUBCUTANEOUS at 10:11

## 2023-01-01 RX ADMIN — ONDANSETRON 4 MG: 4 TABLET, ORALLY DISINTEGRATING ORAL at 17:07

## 2023-01-01 RX ADMIN — PIPERACILLIN AND TAZOBACTAM 3.38 G: 3; .375 INJECTION, POWDER, FOR SOLUTION INTRAVENOUS at 20:47

## 2023-01-01 RX ADMIN — HYDROMORPHONE HYDROCHLORIDE 0.5 MG: 1 INJECTION, SOLUTION INTRAMUSCULAR; INTRAVENOUS; SUBCUTANEOUS at 23:34

## 2023-01-01 RX ADMIN — PIPERACILLIN AND TAZOBACTAM 3.38 G: 3; .375 INJECTION, POWDER, FOR SOLUTION INTRAVENOUS at 12:45

## 2023-01-01 RX ADMIN — THIAMINE HYDROCHLORIDE 100 MG: 100 INJECTION, SOLUTION INTRAMUSCULAR; INTRAVENOUS at 04:38

## 2023-01-01 RX ADMIN — BISACODYL 10 MG: 10 SUPPOSITORY RECTAL at 16:36

## 2023-01-01 RX ADMIN — VANCOMYCIN HYDROCHLORIDE 125 MG: 5 INJECTION, POWDER, LYOPHILIZED, FOR SOLUTION INTRAVENOUS at 05:21

## 2023-01-01 RX ADMIN — DEXTROSE AND SODIUM CHLORIDE: 5; 900 INJECTION, SOLUTION INTRAVENOUS at 05:55

## 2023-01-01 ASSESSMENT — ENCOUNTER SYMPTOMS
SHORTNESS OF BREATH: 0
VOMITING: 1
HEADACHES: 0
VOMITING: 0
SPUTUM PRODUCTION: 0
WHEEZING: 0
EYE PAIN: 0
EYES NEGATIVE: 1
ABDOMINAL PAIN: 0
DIZZINESS: 0
COUGH: 0
NAUSEA: 0
SPEECH CHANGE: 0
BACK PAIN: 0
WHEEZING: 0
ABDOMINAL PAIN: 0
ABDOMINAL PAIN: 0
NECK PAIN: 0
ABDOMINAL PAIN: 1
SORE THROAT: 0
CHILLS: 0
CARDIOVASCULAR NEGATIVE: 1
PALPITATIONS: 0
VOMITING: 1
SENSORY CHANGE: 0
DIZZINESS: 0
DIARRHEA: 0
VOMITING: 0
SHORTNESS OF BREATH: 0
DIARRHEA: 0
HEADACHES: 0
SENSORY CHANGE: 0
WEAKNESS: 1
BLURRED VISION: 0
ABDOMINAL PAIN: 1
DIZZINESS: 0
CHILLS: 0
CONSTIPATION: 1
BLURRED VISION: 0
VOMITING: 0
DIZZINESS: 0
FALLS: 0
DIARRHEA: 1
FEVER: 0
HEARTBURN: 0
ABDOMINAL PAIN: 1
NERVOUS/ANXIOUS: 1
BLOOD IN STOOL: 0
PALPITATIONS: 0
PALPITATIONS: 0
HEADACHES: 0
COUGH: 0
DOUBLE VISION: 0
SPUTUM PRODUCTION: 0
HEADACHES: 0
HALLUCINATIONS: 0
ABDOMINAL PAIN: 1
BACK PAIN: 0
ORTHOPNEA: 0
CHILLS: 0
COUGH: 0
DOUBLE VISION: 0
PALPITATIONS: 0
COUGH: 0
CARDIOVASCULAR NEGATIVE: 1
CHILLS: 0
CHILLS: 0
COUGH: 0
HEMOPTYSIS: 0
SPUTUM PRODUCTION: 0
COUGH: 0
BACK PAIN: 1
NAUSEA: 1
WHEEZING: 0
WEIGHT LOSS: 1
ABDOMINAL PAIN: 0
DIAPHORESIS: 0
COUGH: 0
PALPITATIONS: 0
CHILLS: 0
HEADACHES: 0
CONSTIPATION: 1
NAUSEA: 1
ABDOMINAL PAIN: 1
CHILLS: 0
WEIGHT LOSS: 1
FOCAL WEAKNESS: 0
NAUSEA: 0
SORE THROAT: 0
ABDOMINAL PAIN: 0
SPUTUM PRODUCTION: 0
FEVER: 0
FEVER: 0
RESPIRATORY NEGATIVE: 1
HEADACHES: 0
DOUBLE VISION: 0
DIAPHORESIS: 0
WEAKNESS: 1
SHORTNESS OF BREATH: 0
CONSTIPATION: 1
CARDIOVASCULAR NEGATIVE: 1
HEARTBURN: 0
WEAKNESS: 1
SPEECH CHANGE: 0
BLURRED VISION: 0
BLURRED VISION: 0
FALLS: 0
NAUSEA: 1
NAUSEA: 1
MYALGIAS: 0
VOMITING: 0
SPEECH CHANGE: 0
NAUSEA: 0
BLURRED VISION: 0
SORE THROAT: 0
DIAPHORESIS: 0
BACK PAIN: 0
RESPIRATORY NEGATIVE: 1
SORE THROAT: 0
SHORTNESS OF BREATH: 0
DEPRESSION: 0
HEARTBURN: 0
WEIGHT LOSS: 1
NAUSEA: 0
SHORTNESS OF BREATH: 0
NAUSEA: 1
MYALGIAS: 0
PALPITATIONS: 0
CHILLS: 0
COUGH: 0
BLOOD IN STOOL: 0
NAUSEA: 1
BACK PAIN: 0
CHILLS: 0
SORE THROAT: 0
DIZZINESS: 0
FEVER: 0
DOUBLE VISION: 0
COUGH: 0
DIARRHEA: 0
VOMITING: 0
HEADACHES: 0
DIAPHORESIS: 0
CHILLS: 0
STRIDOR: 0
NAUSEA: 1
DOUBLE VISION: 0
SPEECH CHANGE: 0
SHORTNESS OF BREATH: 0
SORE THROAT: 0
HEADACHES: 0
COUGH: 0
WEAKNESS: 1
COUGH: 0
CONSTIPATION: 0
DIZZINESS: 0
HEADACHES: 0
WEIGHT LOSS: 1
RESPIRATORY NEGATIVE: 1
COUGH: 0
MYALGIAS: 0
FALLS: 0
SPEECH CHANGE: 0
CHILLS: 0
CARDIOVASCULAR NEGATIVE: 1
HEADACHES: 0
NAUSEA: 1
VOMITING: 0
DIZZINESS: 0
CLAUDICATION: 0
RESPIRATORY NEGATIVE: 1
DIZZINESS: 0
PALPITATIONS: 0
VOMITING: 0
VOMITING: 0
SHORTNESS OF BREATH: 0
DIAPHORESIS: 0
VOMITING: 0
WHEEZING: 0
HEADACHES: 0
FEVER: 0
DIARRHEA: 0
MYALGIAS: 0
BACK PAIN: 0
ABDOMINAL PAIN: 1
VOMITING: 0
HEARTBURN: 0
NAUSEA: 0
WHEEZING: 0
FEVER: 0
FEVER: 0
VOMITING: 1
CONSTIPATION: 1
HEADACHES: 0
DIARRHEA: 0
PND: 0
NECK PAIN: 0
CARDIOVASCULAR NEGATIVE: 1
EYES NEGATIVE: 1
ORTHOPNEA: 0
BLURRED VISION: 0
NERVOUS/ANXIOUS: 1
FEVER: 0
HEADACHES: 0
PALPITATIONS: 0
FLANK PAIN: 0
NECK PAIN: 0
WHEEZING: 0
MYALGIAS: 0
SORE THROAT: 0
SORE THROAT: 0
SHORTNESS OF BREATH: 0
CLAUDICATION: 0
MYALGIAS: 0
NECK PAIN: 0
FLANK PAIN: 0
DIARRHEA: 0
VOMITING: 0
PALPITATIONS: 0
PALPITATIONS: 0
COUGH: 0
ORTHOPNEA: 0
DIARRHEA: 0
DIAPHORESIS: 0
COUGH: 0
COUGH: 0
SENSORY CHANGE: 0
SHORTNESS OF BREATH: 0
MYALGIAS: 0
CHILLS: 1
FOCAL WEAKNESS: 0
NECK PAIN: 0
SHORTNESS OF BREATH: 0
NAUSEA: 1
HEADACHES: 0
NAUSEA: 1
VOMITING: 0
VOMITING: 0
ABDOMINAL PAIN: 1
PALPITATIONS: 0
MYALGIAS: 0
BACK PAIN: 0
ORTHOPNEA: 0
CHILLS: 0
BACK PAIN: 0
ABDOMINAL PAIN: 1
FALLS: 0
WEIGHT LOSS: 1
ABDOMINAL PAIN: 0
BLURRED VISION: 0
ABDOMINAL PAIN: 1
VOMITING: 0
ABDOMINAL PAIN: 0
DIARRHEA: 1
MYALGIAS: 0
NAUSEA: 0
SPEECH CHANGE: 0
BACK PAIN: 1
MYALGIAS: 0
FALLS: 0
COUGH: 0
SENSORY CHANGE: 0
WHEEZING: 0
COUGH: 0
FEVER: 0
VOMITING: 1
FEVER: 0
WEAKNESS: 0
SHORTNESS OF BREATH: 0
HEARTBURN: 0
FEVER: 0
RESPIRATORY NEGATIVE: 1
CONSTIPATION: 1
NAUSEA: 1
PALPITATIONS: 0
VOMITING: 1
WEAKNESS: 1
CARDIOVASCULAR NEGATIVE: 1
EYES NEGATIVE: 1
NERVOUS/ANXIOUS: 1
VOMITING: 0
CLAUDICATION: 0
SHORTNESS OF BREATH: 0
FEVER: 0
TREMORS: 0
MYALGIAS: 0
SHORTNESS OF BREATH: 0
ABDOMINAL PAIN: 0
WEAKNESS: 1
DIZZINESS: 0
HEARTBURN: 0
CONSTIPATION: 1
PALPITATIONS: 0
NERVOUS/ANXIOUS: 1
FALLS: 0
FEVER: 0
VOMITING: 0
FOCAL WEAKNESS: 0
FOCAL WEAKNESS: 0
NAUSEA: 1
BLURRED VISION: 0
FEVER: 0
NAUSEA: 0
HEARTBURN: 0
HEMOPTYSIS: 0
FLANK PAIN: 0
CHILLS: 0
SORE THROAT: 0
ABDOMINAL PAIN: 1
CHILLS: 0
DIAPHORESIS: 0
FEVER: 0
DIZZINESS: 0
EYES NEGATIVE: 1
HEADACHES: 0
PALPITATIONS: 0
COUGH: 0
FEVER: 0
WHEEZING: 0
CLAUDICATION: 0
NAUSEA: 0
PALPITATIONS: 0
FLANK PAIN: 0
COUGH: 0
NAUSEA: 0
NAUSEA: 1
VOMITING: 0
NAUSEA: 1
NAUSEA: 0
DIZZINESS: 0
SORE THROAT: 0
DIZZINESS: 0
MYALGIAS: 0
VOMITING: 0
COUGH: 0
VOMITING: 0
HEARTBURN: 0
CHILLS: 0
NECK PAIN: 0
DIZZINESS: 0
NERVOUS/ANXIOUS: 1
FEVER: 0
RESPIRATORY NEGATIVE: 1
SINUS PAIN: 0
DIZZINESS: 0
PHOTOPHOBIA: 0
FEVER: 0
DOUBLE VISION: 0
SENSORY CHANGE: 0
FOCAL WEAKNESS: 0
CONSTIPATION: 1
ABDOMINAL PAIN: 1
BACK PAIN: 0
DIARRHEA: 0
BLURRED VISION: 0
DIAPHORESIS: 0
ABDOMINAL PAIN: 1
SHORTNESS OF BREATH: 0
SHORTNESS OF BREATH: 0
SORE THROAT: 0
NAUSEA: 1
PALPITATIONS: 0
POLYDIPSIA: 0
DIARRHEA: 0
DIZZINESS: 0
CHILLS: 0
ABDOMINAL PAIN: 1
SHORTNESS OF BREATH: 0
VOMITING: 0
WHEEZING: 0
EYES NEGATIVE: 1
BLOOD IN STOOL: 0
BACK PAIN: 0
DIZZINESS: 0
ABDOMINAL PAIN: 0
ABDOMINAL PAIN: 0
BRUISES/BLEEDS EASILY: 0
SHORTNESS OF BREATH: 0
NAUSEA: 1
FEVER: 0
HEMOPTYSIS: 0
MYALGIAS: 0
NAUSEA: 1
FLANK PAIN: 0
DOUBLE VISION: 0
DIAPHORESIS: 0
WHEEZING: 0
MYALGIAS: 0
DIARRHEA: 0
FALLS: 0
DIAPHORESIS: 0
ABDOMINAL PAIN: 0
SHORTNESS OF BREATH: 0
CHILLS: 0
HEADACHES: 0
VOMITING: 0
FEVER: 0
HEARTBURN: 0
PALPITATIONS: 0
ABDOMINAL PAIN: 1
HEADACHES: 0
BACK PAIN: 0
EYES NEGATIVE: 1
CHILLS: 0
HEARTBURN: 0
WEAKNESS: 1
BLURRED VISION: 0
CHILLS: 0
SHORTNESS OF BREATH: 0
FLANK PAIN: 0
FEVER: 0
NAUSEA: 1
MYALGIAS: 0
DEPRESSION: 1
FEVER: 0
WEIGHT LOSS: 1
SORE THROAT: 0
SHORTNESS OF BREATH: 0
BLURRED VISION: 0
HEADACHES: 0
BACK PAIN: 0
BACK PAIN: 0
SORE THROAT: 0
WEIGHT LOSS: 1
SHORTNESS OF BREATH: 0
BLURRED VISION: 0
DIZZINESS: 0
BACK PAIN: 0
FLANK PAIN: 1
ABDOMINAL PAIN: 0
HEMOPTYSIS: 0
FOCAL WEAKNESS: 0
NECK PAIN: 0
SHORTNESS OF BREATH: 1
BLOOD IN STOOL: 0
ABDOMINAL PAIN: 1
NERVOUS/ANXIOUS: 1
ABDOMINAL PAIN: 0
FLANK PAIN: 0
SENSORY CHANGE: 0
DIZZINESS: 0
TINGLING: 0
PALPITATIONS: 0
COUGH: 0
SHORTNESS OF BREATH: 0
CHILLS: 0
CHILLS: 0
BLURRED VISION: 0
VOMITING: 0
VOMITING: 1
FEVER: 0
CHILLS: 0

## 2023-01-01 ASSESSMENT — PAIN DESCRIPTION - PAIN TYPE
TYPE: SURGICAL PAIN
TYPE: ACUTE PAIN
TYPE: ACUTE PAIN;SURGICAL PAIN
TYPE: SURGICAL PAIN
TYPE: ACUTE PAIN
TYPE: ACUTE PAIN;SURGICAL PAIN
TYPE: ACUTE PAIN
TYPE: SURGICAL PAIN
TYPE: ACUTE PAIN
TYPE: ACUTE PAIN;CHRONIC PAIN
TYPE: ACUTE PAIN
TYPE: SURGICAL PAIN
TYPE: SURGICAL PAIN
TYPE: ACUTE PAIN
TYPE: SURGICAL PAIN
TYPE: ACUTE PAIN;CHRONIC PAIN
TYPE: ACUTE PAIN
TYPE: ACUTE PAIN;CHRONIC PAIN
TYPE: ACUTE PAIN;CHRONIC PAIN
TYPE: ACUTE PAIN
TYPE: SURGICAL PAIN
TYPE: ACUTE PAIN
TYPE: ACUTE PAIN
TYPE: SURGICAL PAIN
TYPE: ACUTE PAIN
TYPE: SURGICAL PAIN
TYPE: ACUTE PAIN;CHRONIC PAIN
TYPE: SURGICAL PAIN
TYPE: CHRONIC PAIN
TYPE: ACUTE PAIN
TYPE: SURGICAL PAIN
TYPE: ACUTE PAIN;SURGICAL PAIN
TYPE: ACUTE PAIN
TYPE: ACUTE PAIN;CHRONIC PAIN
TYPE: ACUTE PAIN
TYPE: SURGICAL PAIN
TYPE: SURGICAL PAIN
TYPE: ACUTE PAIN
TYPE: SURGICAL PAIN
TYPE: SURGICAL PAIN
TYPE: ACUTE PAIN;SURGICAL PAIN
TYPE: ACUTE PAIN;CHRONIC PAIN
TYPE: ACUTE PAIN
TYPE: SURGICAL PAIN
TYPE: ACUTE PAIN
TYPE: SURGICAL PAIN
TYPE: ACUTE PAIN
TYPE: CHRONIC PAIN
TYPE: SURGICAL PAIN
TYPE: SURGICAL PAIN
TYPE: ACUTE PAIN
TYPE: ACUTE PAIN;CHRONIC PAIN
TYPE: ACUTE PAIN;SURGICAL PAIN
TYPE: ACUTE PAIN
TYPE: ACUTE PAIN
TYPE: ACUTE PAIN;SURGICAL PAIN
TYPE: ACUTE PAIN
TYPE: SURGICAL PAIN
TYPE: ACUTE PAIN;SURGICAL PAIN
TYPE: SURGICAL PAIN
TYPE: ACUTE PAIN
TYPE: ACUTE PAIN;SURGICAL PAIN
TYPE: SURGICAL PAIN
TYPE: ACUTE PAIN
TYPE: ACUTE PAIN
TYPE: ACUTE PAIN;SURGICAL PAIN
TYPE: ACUTE PAIN
TYPE: ACUTE PAIN;CHRONIC PAIN
TYPE: ACUTE PAIN
TYPE: ACUTE PAIN;CHRONIC PAIN
TYPE: ACUTE PAIN;CHRONIC PAIN
TYPE: SURGICAL PAIN
TYPE: ACUTE PAIN
TYPE: ACUTE PAIN;SURGICAL PAIN
TYPE: ACUTE PAIN
TYPE: ACUTE PAIN
TYPE: SURGICAL PAIN
TYPE: ACUTE PAIN
TYPE: SURGICAL PAIN
TYPE: ACUTE PAIN;CHRONIC PAIN
TYPE: ACUTE PAIN
TYPE: ACUTE PAIN;SURGICAL PAIN
TYPE: ACUTE PAIN;CHRONIC PAIN
TYPE: ACUTE PAIN
TYPE: ACUTE PAIN;CHRONIC PAIN
TYPE: ACUTE PAIN
TYPE: SURGICAL PAIN
TYPE: ACUTE PAIN
TYPE: SURGICAL PAIN
TYPE: SURGICAL PAIN
TYPE: ACUTE PAIN
TYPE: ACUTE PAIN
TYPE: SURGICAL PAIN
TYPE: ACUTE PAIN
TYPE: ACUTE PAIN;CHRONIC PAIN
TYPE: ACUTE PAIN;CHRONIC PAIN
TYPE: ACUTE PAIN
TYPE: ACUTE PAIN
TYPE: SURGICAL PAIN
TYPE: ACUTE PAIN;CHRONIC PAIN
TYPE: ACUTE PAIN
TYPE: SURGICAL PAIN
TYPE: ACUTE PAIN
TYPE: ACUTE PAIN
TYPE: ACUTE PAIN;SURGICAL PAIN
TYPE: ACUTE PAIN;CHRONIC PAIN
TYPE: ACUTE PAIN
TYPE: ACUTE PAIN;CHRONIC PAIN
TYPE: SURGICAL PAIN
TYPE: ACUTE PAIN
TYPE: ACUTE PAIN;SURGICAL PAIN
TYPE: ACUTE PAIN
TYPE: ACUTE PAIN
TYPE: SURGICAL PAIN
TYPE: ACUTE PAIN
TYPE: SURGICAL PAIN
TYPE: ACUTE PAIN;CHRONIC PAIN
TYPE: ACUTE PAIN
TYPE: ACUTE PAIN;CHRONIC PAIN
TYPE: ACUTE PAIN
TYPE: ACUTE PAIN
TYPE: SURGICAL PAIN
TYPE: ACUTE PAIN
TYPE: SURGICAL PAIN
TYPE: ACUTE PAIN
TYPE: ACUTE PAIN
TYPE: ACUTE PAIN;CHRONIC PAIN
TYPE: ACUTE PAIN

## 2023-01-01 ASSESSMENT — PATIENT HEALTH QUESTIONNAIRE - PHQ9
5. POOR APPETITE OR OVEREATING: NEARLY EVERY DAY
SUM OF ALL RESPONSES TO PHQ9 QUESTIONS 1 AND 2: 0
2. FEELING DOWN, DEPRESSED, IRRITABLE, OR HOPELESS: NOT AT ALL
1. LITTLE INTEREST OR PLEASURE IN DOING THINGS: NOT AT ALL
1. LITTLE INTEREST OR PLEASURE IN DOING THINGS: NOT AT ALL
1. LITTLE INTEREST OR PLEASURE IN DOING THINGS: MORE THAN HALF THE DAYS
6. FEELING BAD ABOUT YOURSELF - OR THAT YOU ARE A FAILURE OR HAVE LET YOURSELF OR YOUR FAMILY DOWN: NOT AL ALL
9. THOUGHTS THAT YOU WOULD BE BETTER OFF DEAD, OR OF HURTING YOURSELF: NOT AT ALL
2. FEELING DOWN, DEPRESSED, IRRITABLE, OR HOPELESS: NOT AT ALL
4. FEELING TIRED OR HAVING LITTLE ENERGY: NEARLY EVERY DAY
3. TROUBLE FALLING OR STAYING ASLEEP OR SLEEPING TOO MUCH: NEARLY EVERY DAY
SUM OF ALL RESPONSES TO PHQ9 QUESTIONS 1 AND 2: 0
7. TROUBLE CONCENTRATING ON THINGS, SUCH AS READING THE NEWSPAPER OR WATCHING TELEVISION: NOT AT ALL
2. FEELING DOWN, DEPRESSED, IRRITABLE, OR HOPELESS: NOT AT ALL
SUM OF ALL RESPONSES TO PHQ9 QUESTIONS 1 AND 2: 2
SUM OF ALL RESPONSES TO PHQ QUESTIONS 1-9: 11
8. MOVING OR SPEAKING SO SLOWLY THAT OTHER PEOPLE COULD HAVE NOTICED. OR THE OPPOSITE, BEING SO FIGETY OR RESTLESS THAT YOU HAVE BEEN MOVING AROUND A LOT MORE THAN USUAL: NOT AT ALL

## 2023-01-01 ASSESSMENT — COGNITIVE AND FUNCTIONAL STATUS - GENERAL
SUGGESTED CMS G CODE MODIFIER DAILY ACTIVITY: CL
WALKING IN HOSPITAL ROOM: A LITTLE
MOVING TO AND FROM BED TO CHAIR: A LITTLE
PERSONAL GROOMING: A LOT
DAILY ACTIVITIY SCORE: 11
WALKING IN HOSPITAL ROOM: A LITTLE
TURNING FROM BACK TO SIDE WHILE IN FLAT BAD: A LOT
SUGGESTED CMS G CODE MODIFIER MOBILITY: CL
DAILY ACTIVITIY SCORE: 13
TOILETING: A LITTLE
CLIMB 3 TO 5 STEPS WITH RAILING: A LITTLE
DRESSING REGULAR UPPER BODY CLOTHING: A LOT
TOILETING: A LITTLE
CLIMB 3 TO 5 STEPS WITH RAILING: A LOT
MOVING FROM LYING ON BACK TO SITTING ON SIDE OF FLAT BED: A LITTLE
MOBILITY SCORE: 11
DAILY ACTIVITIY SCORE: 22
EATING MEALS: TOTAL
HELP NEEDED FOR BATHING: A LOT
MOVING FROM LYING ON BACK TO SITTING ON SIDE OF FLAT BED: A LOT
WALKING IN HOSPITAL ROOM: A LITTLE
SUGGESTED CMS G CODE MODIFIER DAILY ACTIVITY: CK
MOVING TO AND FROM BED TO CHAIR: A LOT
CLIMB 3 TO 5 STEPS WITH RAILING: A LOT
PERSONAL GROOMING: A LOT
DAILY ACTIVITIY SCORE: 11
TURNING FROM BACK TO SIDE WHILE IN FLAT BAD: A LOT
MOBILITY SCORE: 13
MOBILITY SCORE: 14
PERSONAL GROOMING: A LITTLE
MOBILITY SCORE: 14
TOILETING: A LOT
MOVING FROM LYING ON BACK TO SITTING ON SIDE OF FLAT BED: UNABLE
MOBILITY SCORE: 19
DAILY ACTIVITIY SCORE: 24
CLIMB 3 TO 5 STEPS WITH RAILING: A LOT
SUGGESTED CMS G CODE MODIFIER MOBILITY: CJ
MOVING FROM LYING ON BACK TO SITTING ON SIDE OF FLAT BED: A LOT
DRESSING REGULAR UPPER BODY CLOTHING: A LOT
PERSONAL GROOMING: A LOT
WALKING IN HOSPITAL ROOM: A LITTLE
DRESSING REGULAR LOWER BODY CLOTHING: A LOT
SUGGESTED CMS G CODE MODIFIER DAILY ACTIVITY: CJ
SUGGESTED CMS G CODE MODIFIER MOBILITY: CL
MOVING TO AND FROM BED TO CHAIR: A LITTLE
MOBILITY SCORE: 21
HELP NEEDED FOR BATHING: A LOT
SUGGESTED CMS G CODE MODIFIER DAILY ACTIVITY: CL
SUGGESTED CMS G CODE MODIFIER DAILY ACTIVITY: CL
SUGGESTED CMS G CODE MODIFIER MOBILITY: CL
STANDING UP FROM CHAIR USING ARMS: A LITTLE
TURNING FROM BACK TO SIDE WHILE IN FLAT BAD: UNABLE
TOILETING: A LOT
CLIMB 3 TO 5 STEPS WITH RAILING: A LOT
STANDING UP FROM CHAIR USING ARMS: A LITTLE
DRESSING REGULAR LOWER BODY CLOTHING: A LOT
STANDING UP FROM CHAIR USING ARMS: A LOT
SUGGESTED CMS G CODE MODIFIER MOBILITY: CL
DRESSING REGULAR LOWER BODY CLOTHING: A LITTLE
MOVING TO AND FROM BED TO CHAIR: A LOT
MOVING FROM LYING ON BACK TO SITTING ON SIDE OF FLAT BED: A LOT
WALKING IN HOSPITAL ROOM: A LOT
DRESSING REGULAR LOWER BODY CLOTHING: A LOT
SUGGESTED CMS G CODE MODIFIER MOBILITY: CK
TURNING FROM BACK TO SIDE WHILE IN FLAT BAD: A LITTLE
WALKING IN HOSPITAL ROOM: A LITTLE
STANDING UP FROM CHAIR USING ARMS: A LITTLE
SUGGESTED CMS G CODE MODIFIER DAILY ACTIVITY: CH
STANDING UP FROM CHAIR USING ARMS: A LITTLE
DRESSING REGULAR LOWER BODY CLOTHING: A LOT
HELP NEEDED FOR BATHING: A LOT
DAILY ACTIVITIY SCORE: 17
MOVING TO AND FROM BED TO CHAIR: A LITTLE
CLIMB 3 TO 5 STEPS WITH RAILING: A LOT
DRESSING REGULAR UPPER BODY CLOTHING: A LOT
TURNING FROM BACK TO SIDE WHILE IN FLAT BAD: A LOT
EATING MEALS: TOTAL
MOVING TO AND FROM BED TO CHAIR: UNABLE
SUGGESTED CMS G CODE MODIFIER MOBILITY: CL
TOILETING: A LOT
STANDING UP FROM CHAIR USING ARMS: A LITTLE
EATING MEALS: A LITTLE
HELP NEEDED FOR BATHING: A LOT
DRESSING REGULAR UPPER BODY CLOTHING: A LITTLE
MOVING TO AND FROM BED TO CHAIR: UNABLE
MOVING FROM LYING ON BACK TO SITTING ON SIDE OF FLAT BED: A LOT
TURNING FROM BACK TO SIDE WHILE IN FLAT BAD: A LOT
MOBILITY SCORE: 14

## 2023-01-01 ASSESSMENT — LIFESTYLE VARIABLES
ALCOHOL_USE: NO
CONSUMPTION TOTAL: NEGATIVE
TOTAL SCORE: 0
EVER FELT BAD OR GUILTY ABOUT YOUR DRINKING: NO
TOTAL SCORE: 0
TOTAL SCORE: 0
HAVE PEOPLE ANNOYED YOU BY CRITICIZING YOUR DRINKING: NO
ON A TYPICAL DAY WHEN YOU DRINK ALCOHOL HOW MANY DRINKS DO YOU HAVE: 0
EVER HAD A DRINK FIRST THING IN THE MORNING TO STEADY YOUR NERVES TO GET RID OF A HANGOVER: NO
ALCOHOL_USE: YES
EVER HAD A DRINK FIRST THING IN THE MORNING TO STEADY YOUR NERVES TO GET RID OF A HANGOVER: NO
HOW MANY TIMES IN THE PAST YEAR HAVE YOU HAD 5 OR MORE DRINKS IN A DAY: 0
HAVE YOU EVER FELT YOU SHOULD CUT DOWN ON YOUR DRINKING: NO
DOES PATIENT WANT TO STOP DRINKING: NO
AVERAGE NUMBER OF DAYS PER WEEK YOU HAVE A DRINK CONTAINING ALCOHOL: 0
TOTAL SCORE: 0
HAVE PEOPLE ANNOYED YOU BY CRITICIZING YOUR DRINKING: NO
TOTAL SCORE: 0
DOES PATIENT WANT TO STOP DRINKING: NO
ALCOHOL_USE: NO
DOES PATIENT WANT TO STOP DRINKING: NO
TOTAL SCORE: 0
ALCOHOL_USE: NO
EVER HAD A DRINK FIRST THING IN THE MORNING TO STEADY YOUR NERVES TO GET RID OF A HANGOVER: NO
TOTAL SCORE: 0
CONSUMPTION TOTAL: INCOMPLETE
EVER FELT BAD OR GUILTY ABOUT YOUR DRINKING: NO
ON A TYPICAL DAY WHEN YOU DRINK ALCOHOL HOW MANY DRINKS DO YOU HAVE: 0
EVER FELT BAD OR GUILTY ABOUT YOUR DRINKING: NO
HAVE YOU EVER FELT YOU SHOULD CUT DOWN ON YOUR DRINKING: NO
TOTAL SCORE: 0
TOTAL SCORE: 0
AVERAGE NUMBER OF DAYS PER WEEK YOU HAVE A DRINK CONTAINING ALCOHOL: 0
EVER FELT BAD OR GUILTY ABOUT YOUR DRINKING: NO
CONSUMPTION TOTAL: NEGATIVE
HAVE YOU EVER FELT YOU SHOULD CUT DOWN ON YOUR DRINKING: NO
EVER HAD A DRINK FIRST THING IN THE MORNING TO STEADY YOUR NERVES TO GET RID OF A HANGOVER: NO
HAVE PEOPLE ANNOYED YOU BY CRITICIZING YOUR DRINKING: NO
HOW MANY TIMES IN THE PAST YEAR HAVE YOU HAD 5 OR MORE DRINKS IN A DAY: 0
TOTAL SCORE: 0
HAVE YOU EVER FELT YOU SHOULD CUT DOWN ON YOUR DRINKING: NO
ON A TYPICAL DAY WHEN YOU DRINK ALCOHOL HOW MANY DRINKS DO YOU HAVE: 0
HAVE PEOPLE ANNOYED YOU BY CRITICIZING YOUR DRINKING: NO
SUBSTANCE_ABUSE: 0
CONSUMPTION TOTAL: NEGATIVE
TOTAL SCORE: 0
TOTAL SCORE: 0
HOW MANY TIMES IN THE PAST YEAR HAVE YOU HAD 5 OR MORE DRINKS IN A DAY: 0
AVERAGE NUMBER OF DAYS PER WEEK YOU HAVE A DRINK CONTAINING ALCOHOL: 0

## 2023-01-01 ASSESSMENT — GAIT ASSESSMENTS
DEVIATION: BRADYKINETIC
ASSISTIVE DEVICE: FRONT WHEEL WALKER
GAIT LEVEL OF ASSIST: CONTACT GUARD ASSIST
ASSISTIVE DEVICE: FRONT WHEEL WALKER
DEVIATION: DECREASED BASE OF SUPPORT;SHUFFLED GAIT;BRADYKINETIC
GAIT LEVEL OF ASSIST: CONTACT GUARD ASSIST
GAIT LEVEL OF ASSIST: CONTACT GUARD ASSIST
DISTANCE (FEET): 15
DISTANCE (FEET): 15
ASSISTIVE DEVICE: FRONT WHEEL WALKER
DISTANCE (FEET): 15
ASSISTIVE DEVICE: FRONT WHEEL WALKER
DEVIATION: SHUFFLED GAIT;BRADYKINETIC
DEVIATION: DECREASED BASE OF SUPPORT;SHUFFLED GAIT;BRADYKINETIC
GAIT LEVEL OF ASSIST: CONTACT GUARD ASSIST

## 2023-01-01 ASSESSMENT — ACTIVITIES OF DAILY LIVING (ADL): TOILETING: INDEPENDENT

## 2023-01-01 ASSESSMENT — FIBROSIS 4 INDEX
FIB4 SCORE: 0.8
FIB4 SCORE: 1.19
FIB4 SCORE: 0.83
FIB4 SCORE: 0.88
FIB4 SCORE: 0.88
FIB4 SCORE: 0.57
FIB4 SCORE: 0.78

## 2023-01-01 ASSESSMENT — PAIN SCALES - WONG BAKER
WONGBAKER_NUMERICALRESPONSE: HURTS JUST A LITTLE BIT
WONGBAKER_NUMERICALRESPONSE: HURTS A LITTLE MORE

## 2023-01-01 ASSESSMENT — PAIN SCALES - GENERAL: PAIN_LEVEL: 1

## 2023-08-02 PROBLEM — C25.1: Status: ACTIVE | Noted: 2023-01-01

## 2023-08-02 PROBLEM — C48.0: Status: ACTIVE | Noted: 2023-01-01

## 2023-08-02 PROBLEM — C64.2 MALIGNANT NEOPLASM OF LEFT KIDNEY (HCC): Status: ACTIVE | Noted: 2023-01-01

## 2023-08-02 NOTE — OR SURGEON
Immediate Post OP Note    PreOp Diagnosis: left renal mass      PostOp Diagnosis: same      Procedure(s):  OPEN LEFT RADICAL NEPHRECTOMY - Wound Class: Clean  BIOPSY, PANCREAS, OPEN - Wound Class: Clean    Surgeon(s):  KIM Reynolds M.D. Angelo W Kanellos, M.D. Christos A. Galanopoulos, M.D.    Anesthesiologist/Type of Anesthesia:  Anesthesiologist: Nghia Garcia M.D./General    Surgical Staff:  Circulator: Jazmin Osborne  Operating Room Assistant (ORA): Donn Augustine  Relief Circulator: Elayne Sams R.N.  Relief Scrub: Spencer Ragland  Scrub Person: Nikki Castro; Brenda Khan    Specimens removed if any:  ID Type Source Tests Collected by Time Destination   A : Hilar Lymph Node Tissue Lymph Node PATHOLOGY SPECIMEN Guy Jackson M.D. 8/2/2023  8:56 AM    B : Pancreas Margin Tissue Pancreas PATHOLOGY SPECIMEN Guy Jackson M.D. 8/2/2023 10:04 AM    C : Left Kidney Tissue Kidney PATHOLOGY SPECIMEN Gyu Jackson M.D. 8/2/2023 10:26 AM    D : Pancreatic Body Biopsy Tissue Pancreas PATHOLOGY SPECIMEN Farhan Portillo M.D. 8/2/2023 11:07 AM    E : Medial Margin of Tumor Other Other PATHOLOGY SPECIMEN Guy Jackson M.D. 8/2/2023 11:12 AM        Estimated Blood Loss: 600mL    Findings: locally and apparent regional metastatic spread of cancer medially    Complications: none        8/2/2023 11:53 AM Guy Jackson M.D.

## 2023-08-02 NOTE — ANESTHESIA PREPROCEDURE EVALUATION
Case: [id number] Date/Time: 08/02/23 0715    Procedure: OPEN LEFT RADICAL NEPHRECTOMY    Pre-op diagnosis: RENAL MASS    Location: [facility] OR 07 / SURGERY [facility]    Surgeons: Guy Jackson M.D.      62 yo male, Left renal mass extending from upper pole of left kidney to epigastric    P Med Hx:  Cancer   Renal disorder  Indigestion  Bowel habit changes  Pneumonia  Sleep apnea  Breath shortness  Disorder of thyroid  Pain    P Surg Hx:  Sinus surgery 1988  Ing Hernia  Thyroid lobectomy 2018  Delayed emergence from general anesthesia    Non-smoker  Reports ~2 EtOH drinks/wk    NPO    Relevant Problems   No relevant active problems       Physical Exam    Airway   Mallampati: II  TM distance: >3 FB  Neck ROM: full       Cardiovascular - normal exam  Rhythm: regular  Rate: normal  (-) murmur     Dental - normal exam           Pulmonary - normal exam  Breath sounds clear to auscultation     Abdominal    Neurological - normal exam                 Anesthesia Plan    ASA 2       Plan - general       Airway plan will be ETT    (A.line,  2nd PIV, Thoracic Epidural for post op analgesia)      Induction: intravenous    Postoperative Plan: Postoperative administration of opioids is intended.    Pertinent diagnostic labs and testing reviewed    Informed Consent:    Anesthetic plan and risks discussed with patient.    Use of blood products discussed with: patient whom consented to blood products.

## 2023-08-02 NOTE — OP REPORT
DATE OF SERVICE:  08/02/2023     NAME OF OPERATION:  Open left radical nephrectomy.     PREOPERATIVE DIAGNOSIS:  An 18 cm left renal mass.     POSTOPERATIVE DIAGNOSIS:  An 18 cm left renal mass.     PRIMARY SURGEON:  Guy Jackson MD     FIRST ASSISTANT:  Del Coto MD     INTRAOPERATIVE CONSULTS:  Farhan Portillo MD     FINDINGS:  Very large lobulated tumor with local invasion medially with direct   extension apparently into the pancreas.  Further investigation of the   pancreas by intraoperative ultrasound by Dr. Portillo as well as   exploration of the upper abdomen demonstrated apparent regional metastatic   spread at least to lymph node regions of the right side and the undersurface   of the stomach.     SPECIMENS:  1.  Left radical nephrectomy.  2.  Medial margin of tumor.  3.  Regional lymph nodes.  4.  Open pancreas biopsy per Dr. Portillo.     INDICATIONS:  Briefly, the patient is a 63-year-old gentleman who presented   with abdominal mass and the CT scan demonstrated the presence of an 18 cm left   upper pole renal mass.  There was apparent extension medially as well.    Indications were for operative management.  Informed consent has been   obtained.     OPERATION IN DETAIL:  The patient was taken to the operating room and placed   on the operating table in supine position.  After administration of general   anesthetic, his abdomen was prepped and draped sterilely.  A subcostal   incision was made on the left abdomen.  This was carried across the midline   for approximately 6 cm on the right side.  Sharp dissection was carried down   through the rectus fascia and muscle as well as other layers of the abdominal   wall. The peritoneum was opened sharply with Metzenbaum scissors and opened   along its entire length.  The liver was inspected.  There was no evidence of   any obvious metastatic disease.  The stomach appeared to be within normal   limits.  There was a  significant mass in the left upper quadrant consistent   with a known renal mass.     Colon was then taken down from its lateral attachments on the left side and   reflected medially.  It became quite apparent that there was tumor extension   in the region of the left renal hilum denoting this as a more challenging   portion of the upcoming dissection.  The inferior aspect of the kidney, which   was spared of any disease process, was dissected around Gerota's fascia and   freed.  The gonadal vein was identified and ligated between clips.  Dissection   was then carried up towards the hilum.  There appeared to be some mild   regional lymphadenopathy that was overlying the aorta in this region.  A few   of these were dissected off and sent off for permanent analysis.  Ultimately,   the renal vein was identified, pressed caudad as well as elongated over the   tumor.  The upper border of the vein was fused to the tumor.     After a significant amount of tedious dissection, I was able to find the   takeoff of the left renal artery from the aorta and tied this off with an 0   silk tie.  The same was performed on the kidney side and the artery was   ligated and was transected between.  An additional clip was placed on the   aorta side. Next, attention was turned laterally to free up the surfaces the   side of the kidney as well as superiorly.  Towards the medial superior aspect   of the mass, it became apparent that this was fairly stuck to the undersurface   of the peritoneum.  More medially it was apparently quite stuck to the   pancreas.  Given the absence of any tissue planes, I had to use blunt finger   dissection around the medial aspect of the tumor and it did seem to gross   inspection that this represented a positive margin, right at the level of the   pancreas.  A frozen section was taken here and the preliminary reading was   atypia favoring carcinoma.     At this point, intraoperative consult was called for   Bandar.  At   this time, I was able to further dissect the vein, painstakingly as the tumor   was fused to the upper border of its surface.  Ultimately, I was able to   ligate the vein with an 0 Vicryl suture and transect it.  Remaining   attachments were taken down with the use of clips and cautery.  Ultimately,   the specimen was freed and passed off.  Hemostasis appeared to be fairly good   and spot cautery was used where necessary.     Inspection of the cavity now demonstrated significant amount of indurated   tissue, apparent carcinoma at the undersurface of the pancreas.  The pancreas   felt quite firm and indurated.  At this point, Dr. Portillo arrived and did   further evaluation with ultrasound finding what appeared to be a gross   infiltration of the pancreas with tumor.  Additionally, on the contralateral   side of the stomach, there appeared to be significant involvement of tumor   within the lymph nodes near the takeoff of the celiac structures.  This was deemed   unresectable.  There was an aspect of apparent tumor on the undersurface of   the pancreas, which was carefully dissected off and passed as a separate   specimen.     Some Surgiflo and Vistaseal was then utilized in the surgical cavity after the   wound had been copiously irrigated multiple times.     Intraabdominal contents were replaced, and 0 Vicryl suture was used to run   close the peritoneal surface of the incision. A #1 looped PDS suture was used   to close the anterior abdominal wall fascia in a single layer.  One was begun   laterally on the left and one on the right side.  These were tied near the   midline.  The wound was copiously irrigated and staples were used to close the   skin.  The wound was then dressed sterilely.  A drain had been placed prior   to this in the retroperitoneum and brought out a separate stab incision   inferior to the incision.     He will be admitted to the hospital for ongoing medical attention  in recovery.    Anesthesia is currently managing his epidural for postoperative analgesia.    He tolerated the procedure well and was taken to recovery room in stable   condition.        ______________________________  Guy Jackson MD MCM/TERESITA/ANTOINETTE    DD:  08/02/2023 12:04  DT:  08/02/2023 12:42    Job#:  [id number]

## 2023-08-02 NOTE — ANESTHESIA PROCEDURE NOTES
Airway    Date/Time: 8/2/2023 7:50 AM    Performed by: Nghia Garcia M.D.  Authorized by: Nghia Garcia M.D.    Location:  OR  Urgency:  Elective  Indications for Airway Management:  Anesthesia      Spontaneous Ventilation: absent    Sedation Level:  Deep  Preoxygenated: Yes    Patient Position:  Sniffing  Mask Difficulty Assessment:  1 - vent by mask  Final Airway Type:  Endotracheal airway  Final Endotracheal Airway:  ETT  Cuffed: Yes    Technique Used for Successful ETT Placement:  Direct laryngoscopy    Insertion Site:  Oral  Blade Type:  Noris  Laryngoscope Blade/Videolaryngoscope Blade Size:  4  ETT Size (mm):  7.5  Measured from:  Lips  ETT to Lips (cm):  23  Placement Verified by: auscultation and capnometry    Cormack-Lehane Classification:  Grade IIa - partial view of glottis  Number of Attempts at Approach:  1

## 2023-08-02 NOTE — ANESTHESIA PROCEDURE NOTES
Epidural Block    Date/Time: 8/2/2023 7:36 AM    Performed by: Nghia Garcia M.D.  Authorized by: Nghia Garcia M.D.    Start Time:  8/2/2023 7:36 AM  End Time:  8/2/2023 7:42 AM  Reason for Block: at surgeon's request and post-op pain management    patient identified, IV checked, site marked, risks and benefits discussed, surgical consent, monitors and equipment checked, pre-op evaluation and timeout performed    Patient Position:  Sitting  Prep: ChloraPrep, patient draped and sterile technique    Monitoring:  Blood pressure, continuous pulse oximetry and heart rate  Approach:  Right paramedian  Location:  T7-T8  Injection Technique:  ZHANE saline  Skin infiltration:  Lidocaine  Strength:  1%  Dose:  2ml  Needle Type:  Tuohy  Needle Gauge:  17 G  Needle Length:  3.5 in  Loss of resistance::  6.5  Catheter Size:  19 G  Catheter at Skin Depth:  11   STRICT STERILE TECHNIQUE.  RIGHT PARAMEDIAN TECHNIQUE BY LANDMARKS.  CLEAR ZHANE AND CATH THREADED EASILY.  STERILE BENZOIN AND STERI-STRIPS USED.  CLEAR BIO-OCCLUSIVE DRESSING WITH MEDIPORE TAPE TO SECURE

## 2023-08-02 NOTE — ANESTHESIA POSTPROCEDURE EVALUATION
Patient: Nghia Coffman     Procedure Summary     Date: 08/02/23 Room / Location: [place] [address] / SURGERY [facility]    Anesthesia Start: 0732 Anesthesia Stop: 1202    Procedures:       OPEN LEFT RADICAL NEPHRECTOMY (Abdomen)      BIOPSY, PANCREAS, OPEN (Abdomen) Diagnosis: (LEFT RENAL MASS)    Surgeons: Guy Jackson M.D.; Farhan Portilol M.D. Responsible Provider: Nghia Garcia M.D.    Anesthesia Type: general ASA Status: 2          Final Anesthesia Type: general  Last vitals  BP   Blood Pressure: 120/68    Temp   36.4 °C (97.5 °F)    Pulse   (!) 110   Resp   16    SpO2   98 %      Anesthesia Post Evaluation    Patient location during evaluation: PACU  Patient participation: complete - patient participated  Level of consciousness: awake and alert  Pain score: 1    Airway patency: patent  Anesthetic complications: no  Cardiovascular status: hemodynamically stable  Respiratory status: acceptable  Hydration status: euvolemic    PONV: none          No notable events documented.     Nurse Pain Score: 2 (NPRS)

## 2023-08-02 NOTE — ANESTHESIA PROCEDURE NOTES
Peripheral IV    Date/Time: 8/2/2023 7:58 AM    Performed by: Nghia Garcia M.D.  Authorized by: Nghia Garcia M.D.    Size:  16 G  Laterality:  Left  Peripheral IV Location:  Forearm  Site Prep:  Alcohol  Technique:  Direct puncture  Attempts:  1   ASEPTIC TECHNIQUE.  BIO-OCCLUSIVE CLEAR DRESSING

## 2023-08-02 NOTE — ANESTHESIA TIME REPORT
Anesthesia Start and Stop Event Times     Date Time Event    8/2/2023 0642 Ready for Procedure     0732 Anesthesia Start     1202 Anesthesia Stop        Responsible Staff  08/02/23    Name Role Begin End    Nghia Garcia M.D. Anesth 0732 1202        Overtime Reason:  no overtime (within assigned shift)    Comments:

## 2023-08-02 NOTE — OP REPORT
DATE OF SERVICE:  08/02/2023     INDICATIONS:  The patient is a 63-year-old male patient of Dr. Guy Jackson, who I was consulted intraoperatively.  Dr. Jackson had taken the   patient to the operating room to remove a left renal tumor and retroperitoneum   malignant tumor and upon completing the portion of the procedure, he was   noted to have a large pancreatic tumor extension and into the area of the   celiac trunk, so I was requested to come intraoperatively.     SURGEON:  Farhan Portillo MD     ASSISTANT SURGEON:  Guy Jackson MD from urology.     ANESTHESIA:  General anesthetic.     ESTIMATED BLOOD LOSS:  Less than 5 mL     COMPLICATIONS:  No complications.     FINDINGS:  The patient was found to have a large infiltrative tumor, most   likely related to the retroperitoneal tumor from the kidney invading the   pancreas, retroperitoneum, and celiac region extending up towards the right   crura posterior to the left lateral segment of the liver.     MY PROCEDURES:   1.  Open pancreatic biopsy.  2.  Intraoperative ultrasound survey of the pancreas, celiac trunk and   vasculature, portal vein, and SMA.     COMPLICATIONS:  No complications.     Case was then turned back over to Dr. Jackson to finish.     DESCRIPTION OF PROCEDURE:  The patient was already in the operating room and I   was consulted intraoperatively by the staff per the request of Dr. Jackson   to evaluate the patient's pancreas.  Upon entering, the retractors were in and   noted that there was a large infiltrative mass involving the pancreas   extending up to the retroperitoneum along the celiac trunk and along to the   right crura.  The tumor itself was fused to the posterior wall of the stomach   in that region, but upon opening the lesser sac, we were able to identify the   pancreas and its infiltrative mass. I then used the 5/12 MHz T-probe to do   intraoperative ultrasound survey of this tumor and mass to find the  extension,   whether it involved the SMA portal vein and the celiac trunk.  It did appear   to have direct extension and involvement of the celiac trunk and the left   hepatic artery and the splenic artery along with the splenic vein, but the   portal vein appeared free.  The SMA appeared free as well.  At that point, Dr. Jackson then educated me as to what had happened in the abdomen, showed me   the tumor of the kidney, but it appears as though that this tracked   infiltrative posteriorly along the pancreatic body and into the   retroperitoneum and this may also be invasive nodes in the area of the celiac   trunk invading the pancreatic body.  In any event, using a 10 blade, I did an   incisional biopsy of the pancreatic tumor under ultrasound guidance and sent   that off for permanence.  At that point, there was no surgical option from a   surgical oncology perspective for resection, so at that point, I scrubbed out   and handed the case back over to Dr. Jackson.        ______________________________  Farhan Portillo MD    CAG/RUB/KOBE    DD:  08/02/2023 11:21  DT:  08/02/2023 12:01    Job#:  [id number]    CC:Guy Jackson MD

## 2023-08-03 NOTE — ANESTHESIA POST-OP FOLLOW-UP NOTE
"Anesthesia Pain Service Note    Type:  Epidural catheter    Patient: Nghia Coffman Jr    Patient seen and examined.     BP 95/58 Comment: RN notified  Pulse 88   Temp 36.6 °C (97.9 °F) (Temporal)   Resp 18   Ht 1.854 m (6' 1\")   Wt 83.4 kg (183 lb 13.8 oz)   SpO2 95%   BMI 24.26 kg/m²     Complaints:  No complaints. Mild hypotension overnight. Stable MAPs, Sytolic pressure in the 90s this AM.     Pain:   2/10    LOC:   Awake arousable    Rate:  2ml/hr    Exam:  Vital signs stable, Afebrile and Site clean, dry, intact without tenderness or erythema    Impression:  Adequate analgesia    Assessment & Plan:  Acute post-procedural pain (G89.18)     No change  - continue     Given hypotension overnight along with adequate pain control currently, recommend continuing current rate of 2ml/hr. If BP improves and pain increased, can consider increasing to 4ml/hr as needed.     Prophylactic anticoagulation ok while epidural is in: Prophylactic Lovenox 40 mg SQ daily in epidural patient Hold Lovenox 12 hours prior to and 4 hours following catheter removal.      Chris Emanuel M.D.   Anesthesiology  8/3/2023  9:38 AM  "

## 2023-08-03 NOTE — CARE PLAN
The patient is Stable - Low risk of patient condition declining or worsening    Shift Goals  Clinical Goals: Neuro checks, BP monitor, pain control, safety  Patient Goals: Rest, pain control  Family Goals: Updates    Progress made toward(s) clinical / shift goals:    Problem: Knowledge Deficit - Standard  Goal: Patient and family/care givers will demonstrate understanding of plan of care, disease process/condition, diagnostic tests and medications  Description: Target End Date:  1-3 days or as soon as patient condition allows    Document in Patient Education    1.  Patient and family/caregiver oriented to unit, equipment, visitation policy and means for communicating concern  2.  Complete/review Learning Assessment  3.  Assess knowledge level of disease process/condition, treatment plan, diagnostic tests and medications  4.  Explain disease process/condition, treatment plan, diagnostic tests and medications  Outcome: Progressing     Problem: Hemodynamics  Goal: Patient's hemodynamics, fluid balance and neurologic status will be stable or improve  Description: Target End Date:  Prior to discharge or change in level of care    Document on Assessment and I/O flowsheet templates    1.  Monitor vital signs, pulse oximetry and cardiac monitor per provider order and/or policy  2.  Maintain blood pressure per provider order  3.  Hemodynamic monitoring per provider order  4.  Manage IV fluids and IV infusions  5.  Monitor intake and output  6.  Daily weights per unit policy or provider order  7.  Assess peripheral pulses and capillary refill  8.  Assess color and body temperature  9.  Position patient for maximum circulation/cardiac output  10. Monitor for signs/symptoms of excessive bleeding  11. Assess mental status, restlessness and changes in level of consciousness  12. Monitor temperature and report fever or hypothermia to provider immediately. Consideration of targeted temperature management.  Outcome: Progressing      Problem: Nutrition  Goal: Patient's nutritional and fluid intake will be adequate or improve  Description: Target End Date:  Prior to discharge or change in level of care    Document on I/O flowsheet    1.  Monitor nutritional intake  2.  Monitor weight per provider order  3.  Assess patient's ability to take oral nutrition  4.  Collaborate with Speech Therapy, Dietitian and interdisciplinary team for appropriate feeding and fluid intake  5.  Assist with feeding  Outcome: Progressing     Problem: Urinary Elimination  Goal: Establish and maintain regular urinary output  Description: Target End Date:  Prior to discharge or change in level of care    Document on I/O and Assessment flowsheets    1.  Evaluate need to continue indwelling catheter every shift  2.  Assess signs and symptoms of urinary retention  3.  Assess post-void residual volumes  4.  Implement bladder training program  5.  Encourage scheduled voidings  6.  Assist patient to sit on bedside commode or toilet for voiding  7.  Educate patient and family/caregiver on use and purpose of urine collection devices (document in Patient Education)  Outcome: Progressing     Problem: Mobility  Goal: Patient's capacity to carry out activities will improve  Description: Target End Date:  Prior to discharge or change in level of care    1.  Assess for barriers to mobility/activity  2.  Implement activity per interdisciplinary team recommendations  3.  Target activity level identified and patient/family/caregiver aware of goal  4.  Provide assistive devices  5.  Instruct patient/caregiver on proper use of assistive/adaptive devices  6.  Schedule activities and rest periods to decrease effects of fatigue  7.  Encourage mobilization to extent of ability  8.  Maintain proper body alignment  9.  Provide adequate pain management to allow progressive mobilization  10. Implement pace maker precautions as needed  Outcome: Progressing     Problem: Wound/ / Incision  Healing  Goal: Patient's wound/surgical incision will decrease in size and heals properly  Description: Target End Date:  Prior to discharge or change in level of care    Document on LDA    1.  Assess and document surgical incision/wound  2.  Provide incision/wound care per policy and/or provider orders  3.  Manage surgical drains per policy if applicable  4.  Encourage adequate nutrition to promote wound healing  5.  Collaborate with Clinical Dietician  Outcome: Progressing

## 2023-08-03 NOTE — PROGRESS NOTES
"Urology Progress Note    Post op Day # 1    Overnight Events: None    S: No fevers, chills. Mild nausea, no vomiting.  no flatus.    O:   BP 95/60   Pulse 92   Temp 36.9 °C (98.4 °F) (Temporal)   Resp 16   Ht 1.854 m (6' 1\")   Wt 83.4 kg (183 lb 13.8 oz)   SpO2 97%   Recent Labs     08/02/23  1230 08/03/23  0429   SODIUM 137 132*   POTASSIUM 4.6 4.6   CHLORIDE 100 98   CO2 26 27   GLUCOSE 154* 147*   BUN 17 16   CREATININE 1.18 1.29   CALCIUM 8.2* 8.1*     Recent Labs     08/02/23  1256 08/03/23  0429   WBC 11.6* 11.0*   RBC 3.88* 3.74*   HEMOGLOBIN 11.1* 10.4*   HEMATOCRIT 34.5* 33.6*   MCV 88.9 89.8   MCH 28.6 27.8   MCHC 32.2* 31.0*   RDW 44.7 45.7   PLATELETCT 319 345   MPV 9.7 9.7         Intake/Output Summary (Last 24 hours) at 8/3/2023 0736  Last data filed at 8/3/2023 0642  Gross per 24 hour   Intake 6189.51 ml   Output 1763 ml   Net 4426.51 ml       Exam:  Abdomen soft, benign.  Incisions clean, dry, intact.       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 Pain managed with epidural.  Ambulate, IS.  Advance diet   Consider hawkins and drain removal within next day  To begin anticoag, pending opinion regarding epidural  I will try to call [facility] today   "

## 2023-08-03 NOTE — PROGRESS NOTES
Per Dr. Garcia epidural restarted at 2 ml/hr, ordered fluids started. BP 92/53 30 mins after restarting epidural. Pt complaining of pain in shoulders, states pain is tolerable. Heat packs provided for additional comfort. BP rechecked R arm SBP 84, L arm SBP 94. Pt states pain is tolerable at this current time. Epidural running at 2 ml/hr.

## 2023-08-03 NOTE — PROGRESS NOTES
Received report from previous shift RN  Assessment complete.  A&O x 4. Patient calls appropriately.  Patient ambulates with standby assist. Bed alarm off.   Patient has 4/10 pain. Pain managed with epidural and heat packs.  Reports intermittent nausea. Tolerating regular diet.  Epidural site to spine.  LLQ SARAH to bulb suction.  X2 ab incision  Juarez per epidural, - flatus, - BM.  Patient denies SOB.  SCD's on.    Review plan of care with patient. Call light and personal belongings with in reach. Hourly rounding in place. All needs met at this time.

## 2023-08-03 NOTE — CARE PLAN
The patient is Stable - Low risk of patient condition declining or worsening    Shift Goals  Clinical Goals: neuro checks; hemodynamic stability  Patient Goals: rest; comfort  Family Goals: Updates    Progress made toward(s) clinical / shift goals:       Patient is not progressing towards the following goals:

## 2023-08-03 NOTE — PROGRESS NOTES
AA&Ox4. Denies CP/SOB.  No reports of pain. Epidural in place.   Educated patient regarding pharmacologic and non pharmacologic modalities for pain management.  Skin per flowsheet.  Tolerating regular diet. Denies N/V.  + void via hawkins Last BM PTA.  Pt ambulates x1 w/FWW.  All needs met at this time. Call light within reach. Pt calls appropriately. Bed low and locked, non skid socks in place. Hourly rounding in place.

## 2023-08-03 NOTE — PROGRESS NOTES
4 Eyes Skin Assessment Completed by ALEJANDRA Rogers and ALEJANDRA Newman.    Head WDL  Ears WDL  Nose WDL  Mouth WDL  Neck WDL  Breast/Chest WDL  Shoulder Blades WDL  Spine Epidural site  (R) Arm/Elbow/Hand WDL  (L) Arm/Elbow/Hand WDL  Abdomen x2 ab incisions, LLQ SARAH  Groin Juarez  Scrotum/Coccyx/Buttocks WDL  (R) Leg WDL  (L) Leg WDL  (R) Heel/Foot/Toe WDL  (L) Heel/Foot/Toe WDL          Devices In Places Blood Pressure Cuff, Pulse Ox, Juarez, SCD's, and Nasal Cannula      Interventions In Place NC W/Ear Foams, Pillows, Heels Loaded W/Pillows, and Pressure Redistribution Mattress    Possible Skin Injury No    Pictures Uploaded Into Epic N/A  Wound Consult Placed N/A  RN Wound Prevention Protocol Ordered No

## 2023-08-04 NOTE — PROGRESS NOTES
Note to reader: this note follows the APSO format rather than the historical SOAP format. Assessment and plan located at the top of the note for ease of use.    Chief Complaint  63 y.o. year old male here with No chief complaint on file.      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 Jackson 8/2. Reports overall good pain control until coughing/moving. Poor po intake s/s to nausea, motivated to take shower today and move to chair potentially. Labs stable yesterday. 20cc documented from drain 8/3. VSS.    Disposition  Stable.      PLAN:  Slow with advancement of diet  Juarez out when ambulating  SARAH to be removed   Start anticoags when epidural out, likely tomorrow.   Encourage ambulation.   Oncology consult pending    Review of Systems  Physical Exam   Review of Systems   Constitutional:  Negative for chills and fever.   Respiratory:  Negative for shortness of breath.    Cardiovascular:  Negative for chest pain.   Gastrointestinal:  Positive for nausea. Negative for abdominal pain and vomiting.   Genitourinary:  Negative for dysuria.     Vitals:    08/03/23 1922 08/04/23 0000 08/04/23 0350 08/04/23 0730   BP: 100/61 104/64 104/69 104/71   Pulse: 99 (!) 102 97 93   Resp: 17 18 20 17   Temp: 37.2 °C (99 °F) 36.7 °C (98.1 °F) 36.6 °C (97.9 °F) 36.4 °C (97.5 °F)   TempSrc: Temporal Temporal Temporal Temporal   SpO2: 96% 94% 92% 94%   Weight:       Height:         Physical Exam  Vitals and nursing note reviewed.   HENT:      Head: Normocephalic and atraumatic.      Mouth/Throat:      Mouth: Mucous membranes are dry.   Eyes:      Pupils: Pupils are equal, round, and reactive to light.   Pulmonary:      Effort: Pulmonary effort is normal.   Abdominal:       General: Abdomen is flat.      Tenderness: There is no abdominal tenderness.      Comments: Incisions covered, no saturation noted  Damian drain in place with slightly blood tinged ss fluid   Genitourinary:     Comments: Juarez in place with straw colored urine  Neurological:      Mental Status: He is oriented to person, place, and time.   Psychiatric:         Behavior: Behavior normal.          Hematology Chemistry   Lab Results   Component Value Date/Time    WBC 11.0 (H) 08/03/2023 04:29 AM    HEMOGLOBIN 10.4 (L) 08/03/2023 04:29 AM    HEMATOCRIT 33.6 (L) 08/03/2023 04:29 AM    PLATELETCT 345 08/03/2023 04:29 AM     Lab Results   Component Value Date/Time    SODIUM 132 (L) 08/03/2023 04:29 AM    POTASSIUM 4.6 08/03/2023 04:29 AM    CHLORIDE 98 08/03/2023 04:29 AM    CO2 27 08/03/2023 04:29 AM    GLUCOSE 147 (H) 08/03/2023 04:29 AM    BUN 16 08/03/2023 04:29 AM    CREATININE 1.29 08/03/2023 04:29 AM         Labs not explicitly included in this progress note were reviewed by the author.   Radiology/imaging not explicitly included in this progress note was reviewed by the author.     Core Measures

## 2023-08-04 NOTE — CARE PLAN
The patient is Stable - Low risk of patient condition declining or worsening    Shift Goals  Clinical Goals: Neuro checks, pain control, BP monitor  Patient Goals: Rest  Family Goals: Updates    Progress made toward(s) clinical / shift goals:    Problem: Knowledge Deficit - Standard  Goal: Patient and family/care givers will demonstrate understanding of plan of care, disease process/condition, diagnostic tests and medications  Description: Target End Date:  1-3 days or as soon as patient condition allows    Document in Patient Education    1.  Patient and family/caregiver oriented to unit, equipment, visitation policy and means for communicating concern  2.  Complete/review Learning Assessment  3.  Assess knowledge level of disease process/condition, treatment plan, diagnostic tests and medications  4.  Explain disease process/condition, treatment plan, diagnostic tests and medications  Outcome: Progressing     Problem: Hemodynamics  Goal: Patient's hemodynamics, fluid balance and neurologic status will be stable or improve  Description: Target End Date:  Prior to discharge or change in level of care    Document on Assessment and I/O flowsheet templates    1.  Monitor vital signs, pulse oximetry and cardiac monitor per provider order and/or policy  2.  Maintain blood pressure per provider order  3.  Hemodynamic monitoring per provider order  4.  Manage IV fluids and IV infusions  5.  Monitor intake and output  6.  Daily weights per unit policy or provider order  7.  Assess peripheral pulses and capillary refill  8.  Assess color and body temperature  9.  Position patient for maximum circulation/cardiac output  10. Monitor for signs/symptoms of excessive bleeding  11. Assess mental status, restlessness and changes in level of consciousness  12. Monitor temperature and report fever or hypothermia to provider immediately. Consideration of targeted temperature management.  Outcome: Progressing     Problem:  Nutrition  Goal: Patient's nutritional and fluid intake will be adequate or improve  Description: Target End Date:  Prior to discharge or change in level of care    Document on I/O flowsheet    1.  Monitor nutritional intake  2.  Monitor weight per provider order  3.  Assess patient's ability to take oral nutrition  4.  Collaborate with Speech Therapy, Dietitian and interdisciplinary team for appropriate feeding and fluid intake  5.  Assist with feeding  Outcome: Progressing     Problem: Urinary Elimination  Goal: Establish and maintain regular urinary output  Description: Target End Date:  Prior to discharge or change in level of care    Document on I/O and Assessment flowsheets    1.  Evaluate need to continue indwelling catheter every shift  2.  Assess signs and symptoms of urinary retention  3.  Assess post-void residual volumes  4.  Implement bladder training program  5.  Encourage scheduled voidings  6.  Assist patient to sit on bedside commode or toilet for voiding  7.  Educate patient and family/caregiver on use and purpose of urine collection devices (document in Patient Education)  Outcome: Progressing     Problem: Mobility  Goal: Patient's capacity to carry out activities will improve  Description: Target End Date:  Prior to discharge or change in level of care    1.  Assess for barriers to mobility/activity  2.  Implement activity per interdisciplinary team recommendations  3.  Target activity level identified and patient/family/caregiver aware of goal  4.  Provide assistive devices  5.  Instruct patient/caregiver on proper use of assistive/adaptive devices  6.  Schedule activities and rest periods to decrease effects of fatigue  7.  Encourage mobilization to extent of ability  8.  Maintain proper body alignment  9.  Provide adequate pain management to allow progressive mobilization  10. Implement pace maker precautions as needed  Outcome: Progressing     Problem: Wound/ / Incision Healing  Goal:  Patient's wound/surgical incision will decrease in size and heals properly  Description: Target End Date:  Prior to discharge or change in level of care    Document on LDA    1.  Assess and document surgical incision/wound  2.  Provide incision/wound care per policy and/or provider orders  3.  Manage surgical drains per policy if applicable  4.  Encourage adequate nutrition to promote wound healing  5.  Collaborate with Clinical Dietician  Outcome: Progressing     Problem: Pain - Standard  Goal: Alleviation of pain or a reduction in pain to the patient’s comfort goal  Description: Target End Date:  Prior to discharge or change in level of care    Document on Vitals flowsheet    1.  Document pain using the appropriate pain scale per order or unit policy  2.  Educate and implement non-pharmacologic comfort measures (i.e. relaxation, distraction, massage, cold/heat therapy, etc.)  3.  Pain management medications as ordered  4.  Reassess pain after pain med administration per policy  5.  If opiods administered assess patient's response to pain medication is appropriate per POSS sedation scale  6.  Follow pain management plan developed in collaboration with patient and interdisciplinary team (including palliative care or pain specialists if applicable)  Outcome: Progressing

## 2023-08-04 NOTE — ANESTHESIA POST-OP FOLLOW-UP NOTE
"Anesthesia Pain Service Note    Type:  Epidural catheter    Patient: Nghia Coffman Jr    Patient seen and examined. Chart reviewed.      /69   Pulse 97   Temp 36.6 °C (97.9 °F) (Temporal)   Resp 20   Ht 1.854 m (6' 1\")   Wt 83.4 kg (183 lb 13.8 oz)   SpO2 92%   BMI 24.26 kg/m²     Complaints:  No complaints. Rate increased from 2ml/hr to 4ml/hr yesterday. Denies any pain at this time.    Pain:   0/10    LOC:   Awake, alert.    Rate:  4 ml/hr      Exam:  Vital signs stable, Afebrile and Site clean, dry, intact without tenderness or erythema    Impression:  Adequate analgesia    Assessment & Plan:  Acute post-procedural pain (G89.18)     No change  - continue    Hold Lovenox 12 hours prior to and 4 hours following catheter removal.    Desiree Barfield M.D.  8/4/2023  7:45 AM  "

## 2023-08-04 NOTE — PROGRESS NOTES
Received report from previous shift RN  Assessment complete.  A&O x 4. Reporting full sensation to extremities and no new onset of numbness and tingling Patient calls appropriately.  Patient ambulates with x1 assist w/ FWW. Bed alarm off.   Patient has 1/10 pain. Pain managed with epidural.  Denies N&V. Tolerating regular diet.  Epidural site intact.  LLQ SARAH drain to bulb suction.  X2 ab incisions, DIP, CDI.  Juarez in place, - flatus, - BM.  Patient denies SOB.  SCD's on.    Review plan of care with patient. Call light and personal belongings with in reach. Hourly rounding in place. All needs met at this time.

## 2023-08-05 NOTE — PROGRESS NOTES
Note to reader: this note follows the APSO format rather than the historical SOAP format. Assessment and plan located at the top of the note for ease of use.    Chief Complaint  63 y.o. year old male here with No chief complaint on file.      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 Nausea persistent, minimal ambulation. Good uop from hawkins. +diarrhea per nurse. No new labs. Vitals stable. Had a little bit of food this AM. Ambulating OK to bathroom.     8/4- pod 2 from open left rad nephrectomy with Dr. Jackson 8/2. Reports overall good pain control until coughing/moving. Poor po intake s/s to nausea, motivated to take shower today and move to chair potentially. Labs stable yesterday. 20cc documented from drain 8/3. VSS.    Disposition  Stable.      PLAN:  Epidural out with transition to PO pain meds.   Slow with advancement of diet  Hawkins out when ambulating   Start anticoags today after removal of epidural.   Encourage ambulation.   Oncology consult to be done outpatient.   Consider home tomorrow based off pain s/p removal of epidural.    Review of Systems  Physical Exam   Review of Systems   Constitutional:  Negative for chills and fever.   Respiratory:  Negative for shortness of breath.    Cardiovascular:  Negative for chest pain.   Gastrointestinal:  Positive for diarrhea and nausea. Negative for abdominal pain and vomiting.   Genitourinary:  Negative for dysuria.     Vitals:    08/04/23 1928 08/05/23 0043 08/05/23 0410 08/05/23 0730   BP: 111/71 116/79 114/69 123/71   Pulse: 86 95 98 87   Resp: 17 17 16 17   Temp: 36.1 °C (97 °F) 37 °C (98.6 °F) 36.7 °C (98.1 °F) 36.8 °C (98.2 °F)   TempSrc: Temporal Temporal Temporal Temporal   SpO2: 97% 94% 95% 92%   Weight:       Height:          Physical Exam  Vitals and nursing note reviewed.   HENT:      Head: Normocephalic and atraumatic.      Mouth/Throat:      Mouth: Mucous membranes are dry.   Eyes:      Pupils: Pupils are equal, round, and reactive to light.   Pulmonary:      Effort: Pulmonary effort is normal.   Abdominal:      General: Abdomen is flat.      Tenderness: There is no abdominal tenderness.      Comments: Incisions covered, no saturation noted  Damian drain in place with slightly blood tinged ss fluid   Genitourinary:     Comments: Juarez in place with straw colored urine  Neurological:      Mental Status: He is oriented to person, place, and time.   Psychiatric:         Behavior: Behavior normal.          Hematology Chemistry   Lab Results   Component Value Date/Time    WBC 11.0 (H) 08/03/2023 04:29 AM    HEMOGLOBIN 10.4 (L) 08/03/2023 04:29 AM    HEMATOCRIT 33.6 (L) 08/03/2023 04:29 AM    PLATELETCT 345 08/03/2023 04:29 AM     Lab Results   Component Value Date/Time    SODIUM 132 (L) 08/03/2023 04:29 AM    POTASSIUM 4.6 08/03/2023 04:29 AM    CHLORIDE 98 08/03/2023 04:29 AM    CO2 27 08/03/2023 04:29 AM    GLUCOSE 147 (H) 08/03/2023 04:29 AM    BUN 16 08/03/2023 04:29 AM    CREATININE 1.29 08/03/2023 04:29 AM         Labs not explicitly included in this progress note were reviewed by the author.   Radiology/imaging not explicitly included in this progress note was reviewed by the author.     Core Measures

## 2023-08-05 NOTE — PROGRESS NOTES
AA&Ox4. Denies CP/SOB.  No reports of pain. Epidural in place at 4ml/hr.   Educated patient regarding pharmacologic and non pharmacologic modalities for pain management.  Skin per flowsheet. SARAH in place.   Tolerating regular diet. Denies N/V.  + void via hawkins Last BM PTA.  Pt ambulates x1 w/FWW.  All needs met at this time. Call light within reach. Pt calls appropriately. Bed low and locked, non skid socks in place. Hourly rounding in place.

## 2023-08-05 NOTE — CARE PLAN
The patient is Stable - Low risk of patient condition declining or worsening    Shift Goals  Clinical Goals: remove epidural, increase PO intake  Patient Goals: decrease diarrhea  Family Goals: Updates    Progress made toward(s) clinical / shift goals:  Epidural removed, PO pain medications initiated. Oral intake encouraged by family and nursing.     Patient is not progressing towards the following goals:

## 2023-08-05 NOTE — CARE PLAN
The patient is Stable - Low risk of patient condition declining or worsening    Shift Goals  Clinical Goals: neuro checks, wean epidural  Patient Goals: increased PO intake  Family Goals: Updates    Progress made toward(s) clinical / shift goals:    Problem: Knowledge Deficit - Standard  Goal: Patient and family/care givers will demonstrate understanding of plan of care, disease process/condition, diagnostic tests and medications  Description: Target End Date:  1-3 days or as soon as patient condition allows    Document in Patient Education    1.  Patient and family/caregiver oriented to unit, equipment, visitation policy and means for communicating concern  2.  Complete/review Learning Assessment  3.  Assess knowledge level of disease process/condition, treatment plan, diagnostic tests and medications  4.  Explain disease process/condition, treatment plan, diagnostic tests and medications  Outcome: Progressing     Problem: Hemodynamics  Goal: Patient's hemodynamics, fluid balance and neurologic status will be stable or improve  Description: Target End Date:  Prior to discharge or change in level of care    Document on Assessment and I/O flowsheet templates    1.  Monitor vital signs, pulse oximetry and cardiac monitor per provider order and/or policy  2.  Maintain blood pressure per provider order  3.  Hemodynamic monitoring per provider order  4.  Manage IV fluids and IV infusions  5.  Monitor intake and output  6.  Daily weights per unit policy or provider order  7.  Assess peripheral pulses and capillary refill  8.  Assess color and body temperature  9.  Position patient for maximum circulation/cardiac output  10. Monitor for signs/symptoms of excessive bleeding  11. Assess mental status, restlessness and changes in level of consciousness  12. Monitor temperature and report fever or hypothermia to provider immediately. Consideration of targeted temperature management.  Outcome: Progressing     Problem:  Nutrition  Goal: Patient's nutritional and fluid intake will be adequate or improve  Description: Target End Date:  Prior to discharge or change in level of care    Document on I/O flowsheet    1.  Monitor nutritional intake  2.  Monitor weight per provider order  3.  Assess patient's ability to take oral nutrition  4.  Collaborate with Speech Therapy, Dietitian and interdisciplinary team for appropriate feeding and fluid intake  5.  Assist with feeding  Outcome: Progressing  Goal: Enteral nutrition will be maintained or improve  Description: Target End Date:  Prior to discharge or change in level of care    1. Enteral access to be obtained or modified  2. Advance to goal rate per protocol  3. Collaborate with Clinical Dietitian for signs/symptoms of intolerance  4. Weights per provider order  5. Consider Speech Therapy consult for swallowing difficulties  Outcome: Progressing  Goal: Enteral nutrition will be maintained or improve  Description: Target End Date:  Prior to discharge or change in level of care    1.  Fingerstick glucose every 8 hours  2.  Notify provider for fever or elevated glucose  3.  TPN tubing and port changes every 24 hours.  Turn TPN through dedicated line. (Document on LDA)  4.  TPN dressing changes 24 hours after insertion and then every Saturday  (Document on LDA)  5.  Daily weights  6.  Monitor TPN lab results  7.  Collaborate with Clinical Dietician  8.  Collaborate with Pharmacist  Outcome: Progressing     Problem: Urinary Elimination  Goal: Establish and maintain regular urinary output  Description: Target End Date:  Prior to discharge or change in level of care    Document on I/O and Assessment flowsheets    1.  Evaluate need to continue indwelling catheter every shift  2.  Assess signs and symptoms of urinary retention  3.  Assess post-void residual volumes  4.  Implement bladder training program  5.  Encourage scheduled voidings  6.  Assist patient to sit on bedside commode or toilet  for voiding  7.  Educate patient and family/caregiver on use and purpose of urine collection devices (document in Patient Education)  Outcome: Progressing     Problem: Mobility  Goal: Patient's capacity to carry out activities will improve  Description: Target End Date:  Prior to discharge or change in level of care    1.  Assess for barriers to mobility/activity  2.  Implement activity per interdisciplinary team recommendations  3.  Target activity level identified and patient/family/caregiver aware of goal  4.  Provide assistive devices  5.  Instruct patient/caregiver on proper use of assistive/adaptive devices  6.  Schedule activities and rest periods to decrease effects of fatigue  7.  Encourage mobilization to extent of ability  8.  Maintain proper body alignment  9.  Provide adequate pain management to allow progressive mobilization  10. Implement pace maker precautions as needed  Outcome: Progressing     Problem: Wound/ / Incision Healing  Goal: Patient's wound/surgical incision will decrease in size and heals properly  Description: Target End Date:  Prior to discharge or change in level of care    Document on LDA    1.  Assess and document surgical incision/wound  2.  Provide incision/wound care per policy and/or provider orders  3.  Manage surgical drains per policy if applicable  4.  Encourage adequate nutrition to promote wound healing  5.  Collaborate with Clinical Dietician  Outcome: Progressing     Problem: Pain - Standard  Goal: Alleviation of pain or a reduction in pain to the patient’s comfort goal  Description: Target End Date:  Prior to discharge or change in level of care    Document on Vitals flowsheet    1.  Document pain using the appropriate pain scale per order or unit policy  2.  Educate and implement non-pharmacologic comfort measures (i.e. relaxation, distraction, massage, cold/heat therapy, etc.)  3.  Pain management medications as ordered  4.  Reassess pain after pain med administration  per policy  5.  If opiods administered assess patient's response to pain medication is appropriate per POSS sedation scale  6.  Follow pain management plan developed in collaboration with patient and interdisciplinary team (including palliative care or pain specialists if applicable)  Outcome: Progressing     Problem: Fall Risk  Goal: Patient will remain free from falls  Description: Target End Date:  Prior to discharge or change in level of care    Document interventions on the Kathrine Jay Fall Risk Assessment    1.  Assess for fall risk factors  2.  Implement fall precautions  Outcome: Progressing       Patient is not progressing towards the following goals:

## 2023-08-05 NOTE — PROGRESS NOTES
Received report from previous shift RN. Assumed care of patient at 1900.  Assessment complete.  Patient A&O x 4. Patient calls appropriately.  Patient ambulates with SB assist and FWW.   Patient has 0/10 pain. Epidural weaned down to 2mL/hr per order.   Denies N&V. Tolerating regular diet. Poor PO intake.   D5 1/2NS with 20K at 125 mL/hr.   L SARAH drain to bulb suction, +serosang output.  Abdominal incision with dressing in place, CDI.   + void via Juarez, + flatus, +8/4 BM.  Patient on 1L O2 NC, denies SOB.  SCD's on.     Reviewed plan of care with patient. Call light and personal belongings within reach. Hourly rounding in place. All needs met at this time.

## 2023-08-05 NOTE — CARE PLAN
The patient is Stable - Low risk of patient condition declining or worsening    Shift Goals  Clinical Goals: neuro checks; pain control  Patient Goals: comfort; nausea control  Family Goals: Updates    Progress made toward(s) clinical / shift goals:       Patient is not progressing towards the following goals:

## 2023-08-06 NOTE — CARE PLAN
The patient is Stable - Low risk of patient condition declining or worsening    Shift Goals  Clinical Goals: Drain removal/Ambulation  Patient Goals: Comfort  Family Goals: Discharge instructions    Progress made toward(s) clinical / shift goals:  Patient verbalizes understanding of POC, no further questions.    Patient is not progressing towards the following goals:

## 2023-08-06 NOTE — PROGRESS NOTES
Report received at bedside from previous shift RN Assumed care. Pt in bed. A/O x4.VSS.   Responds appropriately.   Pain 4/10 Medicated per Medicated per MAR Denies SOB/denies chest pain. Provided non pharmacological interventions for comfort.  Denies N/V tolerating regular diet appropriately  Adequate oxygenation noted with RA  +Void via hawkins, +flatus, last BM 8/5 per report  Surgical incision to abdomen, staples MARIANN  Patient ambulates x1 assist, no restrictions   Hawkins and SARAH drain removed at bedside per MD orders  SCDs off, patient up to chair  Assessment complete.   Discussed POC, pt verbalizes understanding.   Explained importance of calling before getting OOB.   Call light and belongings within reach.   Bed in the lowest position. Treaded socks in place. Hourly rounding in progress, currently no further needs.

## 2023-08-06 NOTE — DISCHARGE SUMMARY
Discharge Summary    CHIEF COMPLAINT ON ADMISSION  No chief complaint on file.      Reason for Admission  Disorder of kidney and ureter, uns*     Admission Date  8/2/2023    CODE STATUS  Full Code    HPI & HOSPITAL COURSE  This is a 63 y.o. male here with s/p open left radical nephrectomy with Dr. Jackson 8/2. He was kept a few extra days for pain control and observation. Prior to discharge, he was tolerating PO without increased nausea/vomiting. He has been battling baseline nausea pre op that has not really changed, but he feels OK to manage this at home. Labs and vitals were stable. He was having BM's. Good pain control. Ambulating. Incisions c/d/I. Drain and hawkins removed prior to dc. Ready for discharge.    No notes on file    Therefore, he is discharged in good and stable condition to home with close outpatient follow-up.    The patient met 2-midnight criteria for an inpatient stay at the time of discharge.    Discharge Date  8/6/23    FOLLOW UP ITEMS POST DISCHARGE  Already arranged for path discussion with Dr. Jackson.     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 *      FOLLOW UP  No future appointments.  No follow-up provider specified.    MEDICATIONS ON DISCHARGE     Medication List        ASK your doctor about these medications        Instructions   metoclopramide 10 MG Tabs  Commonly known as: Reglan   Take 10 mg by mouth every 6 hours as needed. Indications: Nausea and Vomiting  Dose: 10 mg     ondansetron 4 MG Tbdp  Commonly known as: Zofran ODT   Take 4 mg by mouth every 6 hours as needed for Nausea/Vomiting.  Dose: 4 mg     oxyCODONE-acetaminophen  MG Tabs  Commonly known as: Percocet-10   Take 1 Tablet by mouth every four hours as needed for Severe Pain.  Dose: 1 Tablet               Allergies  Allergies   Allergen Reactions    Seasonal        DIET  Orders Placed This Encounter   Procedures    Diet Order Diet: Regular     Standing Status:   Standing     Number of Occurrences:   1     Order Specific Question:   Diet:     Answer:   Regular [1]       ACTIVITY  As tolerated.  Weight bearing as tolerated    CONSULTATIONS  None    PROCEDURES  Open left nephrectomy    LABORATORY  Lab Results   Component Value Date    SODIUM 132 (L) 08/03/2023    POTASSIUM 4.6 08/03/2023    CHLORIDE 98 08/03/2023    CO2 27 08/03/2023    GLUCOSE 147 (H) 08/03/2023    BUN 16 08/03/2023    CREATININE 1.29 08/03/2023        Lab Results   Component Value Date    WBC 11.0 (H) 08/03/2023    HEMOGLOBIN 10.4 (L) 08/03/2023    HEMATOCRIT 33.6 (L) 08/03/2023    PLATELETCT 345 08/03/2023        Total time of the discharge process exceeds 35 minutes.

## 2023-08-06 NOTE — CARE PLAN
The patient is Stable - Low risk of patient condition declining or worsening    Shift Goals  Clinical Goals: monitor for pain, rest  Patient Goals: rest    Progress made toward(s) clinical / shift goals:  Pt complained of minimal pain of 4/10 which was decreased to 1/10 by use of tramadol and rest.    Patient is not progressing towards the following goals:

## 2023-08-06 NOTE — PROGRESS NOTES
Bedside report received.  Assessment complete.  A&O x 4. Patient calls appropriately.  Patient ambulates with standby assist  Patient has 0/10 pain. Pain managed with prescribed medications.  Denies N&V. Tolerating regular diet. Pt reports having poor appetite.  LLQ SARAH drain, dressing CDI. Transverse incision to abdomen, dressing CDI..  + void via hawkins, + flatus, - BM. Last BM 8/5/  Patient denies SOB.  Review plan with of care with patient. Call light and personal belongings within reach. Hourly rounding in place. All needs met at this time.

## 2023-08-07 NOTE — PROGRESS NOTES
Patient discharged per order. No complications noted. Scope patch to L ear. Medications sent to pharmacy of choice. All belongings in place.  Patient left unit via wheelchair alongside staff.

## 2023-09-03 PROBLEM — R18.8 ABDOMINAL FLUID COLLECTION: Chronic | Status: ACTIVE | Noted: 2023-01-01

## 2023-09-03 PROBLEM — R91.8 LUNG MASS: Status: ACTIVE | Noted: 2023-01-01

## 2023-09-03 PROBLEM — R91.8 LUNG MASS: Chronic | Status: ACTIVE | Noted: 2023-01-01

## 2023-09-03 PROBLEM — R18.8 ABDOMINAL FLUID COLLECTION: Status: ACTIVE | Noted: 2023-01-01

## 2023-09-03 PROBLEM — R10.9 ABDOMINAL PAIN: Status: ACTIVE | Noted: 2023-01-01

## 2023-09-03 PROBLEM — K85.90 ACUTE PANCREATITIS, UNSPECIFIED COMPLICATION STATUS, UNSPECIFIED PANCREATITIS TYPE: Status: ACTIVE | Noted: 2023-01-01

## 2023-09-03 PROBLEM — Z90.5 ACQUIRED SOLITARY KIDNEY: Chronic | Status: ACTIVE | Noted: 2023-01-01

## 2023-09-03 NOTE — PROGRESS NOTES
Bedside report recieved from night shift ALEJANDRA Arce. patient resting comfortably in bed, call bell in reach. no acute distress noted. patient is a/o x 4, patient offers no complaints at this time. NPO status for possible IR intervention. Patient up self/independent in room.

## 2023-09-03 NOTE — ASSESSMENT & PLAN NOTE
-Elevated lipase at outside hospital however no inflammation of pancreas noted on CT abdomen. Patient did have recent open pancreatic biopsy during left radical nephrectomy -- suspect elevated amylase and lipase are 2/2 to pancreatic leak from biopsy site.  -see a/p under abdominal fluid collection

## 2023-09-03 NOTE — ASSESSMENT & PLAN NOTE
CT chest done at OSH prior to transfer revealed 15 x 15 x 17 mm spiculated mass in inferior margin of the right upper lobe abutting anterior pleural surface of the lung. Could be metastasis from RCC vs synchronous lung primary.   -Patient was not aware of this finding, was discussed at admission and after that.   -outpatient referral for IR imaging guided biopsy of the lung mass

## 2023-09-03 NOTE — PROGRESS NOTES
Patient returned from IR drain procedure at this time. Patient in no acute distress, denies pain. Dressing clean dry and intact. SARAH drain in place and functioning.

## 2023-09-03 NOTE — PROGRESS NOTES
Report received from [facility] at 2220. Assumed care of pt  at 0303. Pt is A&O x 4. Patient stated that their pain is 4/10. Pt's pain comfort goal is 4/10. Pt changed into hospital gown and situated in bed. Spouse is at bedside. Pt educated on how to use the call light, pt verbalized understanding. Bed in lowest locked position, call light within reach, hourly rounding in place. Labs reviewed, orders reviewed, communication board updated. Pt declines any further needs at this time.

## 2023-09-03 NOTE — CARE PLAN
The patient is Stable - Low risk of patient condition declining or worsening    Shift Goals  Clinical Goals: Determine plan of care  Patient Goals: Pt comfort and plan of care  Family Goals: To recieve plan of care    Progress made toward(s) clinical / shift goals:  Pt and spouse talked with MD this shift to obtain a better understanding of plan of care and where it will go from here. Pt and spouse's questions are addressed appropriately and as they come. Pt has confirmed they have a good support system, and that he and his spouse are ready to tackle hospital stay together. Pt made comfortable following admit this AM.     Problem: Knowledge Deficit - Standard  Goal: Patient and family/care givers will demonstrate understanding of plan of care, disease process/condition, diagnostic tests and medications  Outcome: Progressing     Problem: Depression  Goal: Patient and family/caregiver will verbalize accurate information about at least two of the possible causes of depression, three-four of the signs and symptoms of depression  Outcome: Progressing     Patient is not progressing towards the following goals:

## 2023-09-03 NOTE — OR SURGEON
Immediate Post- Operative Note        Findings: L nephrectomy bed fluid collection.       Procedure(s): CT guided drain placement.       Estimated Blood Loss: Less than 5 ml        Complications: None            9/3/2023     11:54 AM     Gerald Ley M.D.

## 2023-09-03 NOTE — DIETARY
"Nutrition services: Day 0 of admit.  Nghia Coffman Jr is a 63 y.o. male with admitting DX of  Abd pain, Acute pancreatitis.     Pt seen for poor PO/ wt loss per admit screen, malnutrition screening tool score of 5. Pt reports poor intake/wt loss since Middle of July/ past 6 weeks which he suspects is related to recent illness/surgery but not totally known; UBW was 195-196#.  Pt states he feels full quickly and having trouble getting adequate nutrition. Reviewed importance of adequate nutrition and tips for optimizing intake, encouraged small, frequent meals with nutrient dense foods/supplements. Pt verbalized understanding and states he is typically only able to take ~ 1 carton/day of supplement drinks such as Boost or Ensure. Pt's wife states pt has had significant muscle loss since Mid July and concerned about his nutrition status and thinking about nutrition support. Reviewed enteral nutrition support as possible option to be discussed with MD if pt not showing improvement with intake.       Assessment:  Height: 182.9 cm (6' 0.01\")  Weight: 83 kg (183 lb)- per stand up scale on 9/3.   Body mass index is 24.81 kg/m²., BMI classification: Normal.   Diet/Intake: NPO.     Evaluation:   Labs/Meds reviewed.   No pressure ulcers noted.   Nutrition Focused Physical Exam: Slight hollowing at temple, squared shoulders, some fat but not ample to upper arm, very little roundness/firmness to calf, slight depression along inner thigh.  Pertinent Medical Hx: renal Ca s/p status post left nephrectomy and partial pancreatectomy on 9/2.  Estimated Needs (MSJ X 1.3-1.5= 2189-0471):  7007-4081 kcals/day (27-30 kcal/kg), 100-125 g pro/day (1.2-1.5 g pro/kg), and 1206-5638 ml free water/day (30-35 ml/kg).   Wt Readings from Last 4 Encounters:  09/03/23 83 kg (183 lb)  08/02/23 83.4 kg (183 lb 13.8 oz)  Wt appears stable over past month. Based on pt's report, pt with 7% wt loss in ~ 6 weeks (severe wt loss).     Malnutrition " Risk: Pt meets severe chronic disease related malnutrition likely related to recent Renal Ca and currently with lung mass that is suspicious of malignancy as evidenced by decreased intake with severe wt loss over past ~ 6 weeks per pt report as well as moderate fat loss and severe muscle loss noted on nutrition focused physical exam.     Recommendations/Plan:  Advance diet as medically feasible, recommend low fat diet for improved tolerance with small frequent meals to optimize intake.   Will add Boost Plus supplements as appropriate with diet advancement.   If no improvement with PO intake in next 48 hours, consider nutrition support. If pt wishes to proceed with enteral nutrition support, pt may benefit from small bowel feeding tube placement past ligament of treitz for improved tolerance.   Encourage PO intake and document all meals/supplements as % taken in ADL's to provide interdisciplinary communication across all shifts.   Monitor weight.  Nutrition rep will continue to see patient for ongoing meal and snack preferences.     RD following.

## 2023-09-03 NOTE — CARE PLAN
The patient is Stable - Low risk of patient condition declining or worsening    Shift Goals  Clinical Goals: IV abx, IR drain, IV fluids, pain control  Patient Goals: pain control, IR drain  Family Goals: To recieve plan of care    Progress made toward(s) clinical / shift goals:  IR drain placed, pain controlled with PRN's, IV abx given    Patient is not progressing towards the following goals:

## 2023-09-03 NOTE — H&P
Hospital Medicine History & Physical Note    Date of Service  9/3/2023    Primary Care Physician  Greg Grande M.D.    Consultants  urology    Specialist Names: TBD, consult in morning    Code Status  Full Code    Chief Complaint  No chief complaint on file.      History of Presenting Illness  Nghia Coffman Jr is a 63 y.o. male who presented 9/3/2023 with abdominal pain.  Patient has a history of renal cell carcinoma status post nephrectomy of the left kidney and partial pancreatectomy with Dr. Love.  He presented to [facility] after several days of anorexia, generalized pain localizing to the right mid back, as well as vomiting.  He has not had fever    Labs from [facility]  BUN 21, creatinine 1.6  Sodium 131, potassium 4.5, bicarb 28  Normal transaminases  WBC 8.4  Hemoglobin 12.2  Platelets 426    CT scan at the outside hospital showed complex 12 cm fluid collection in the left renal surgical bed, abscess could not be excluded.  CT of the chest shows 15 x 15 x 17 mm spiculated mass in the inferior margin of the right upper lobe abutting anterior pleural surface.  No edema or effusion.  No definite adenopathy.  Appearance of spiculated mass suggested primary malignancy rather than metastatic disease.    Patient was given 3.75 g of Zosyn and transferred here for need for urology and interventional radiology    On arrival patient reports feeling fine, better than he has been previously, no specific complaints at this time.    I discussed the plan of care with patient and family.    Review of Systems  Review of Systems   Constitutional:  Negative for chills and fever.   HENT:  Negative for congestion and sore throat.    Eyes:  Negative for blurred vision and double vision.   Respiratory:  Negative for cough and shortness of breath.    Cardiovascular:  Negative for chest pain and palpitations.   Gastrointestinal:  Negative for abdominal pain, heartburn, nausea and vomiting.   Genitourinary:   Negative for dysuria and urgency.   Musculoskeletal:  Negative for back pain and falls.   Neurological:  Negative for dizziness and headaches.       Past Medical History   has a past medical history of Anesthesia, Bowel habit changes, Breath shortness, Cancer (HCC), Delayed emergence from general anesthesia, Disorder of thyroid, Indigestion, Pain, Pneumonia (2000), Renal disorder, and Sleep apnea.    Surgical History   has a past surgical history that includes thyroid lobectomy (2018); inguinal hernia repair (2018); sinuscopy (1988); pr remv kidney,radical (8/2/2023); and biopsy, pancreas (8/2/2023).     Family History  No family history of renal cell cancer  Family history reviewed with patient. There is no family history that is pertinent to the chief complaint.     Social History   reports that he has never smoked. He has never used smokeless tobacco. He reports current alcohol use of about 1.2 oz of alcohol per week. He reports that he does not use drugs.    Allergies  Allergies   Allergen Reactions    Seasonal        Medications  Prior to Admission Medications   Prescriptions Last Dose Informant Patient Reported? Taking?   metoclopramide (REGLAN) 10 MG Tab   Yes No   Sig: Take 10 mg by mouth every 6 hours as needed. Indications: Nausea and Vomiting   ondansetron (ZOFRAN ODT) 4 MG TABLET DISPERSIBLE   Yes No   Sig: Take 4 mg by mouth every 6 hours as needed for Nausea/Vomiting.   oxyCODONE-acetaminophen (PERCOCET-10)  MG Tab   Yes No   Sig: Take 1 Tablet by mouth every four hours as needed for Severe Pain.      Facility-Administered Medications: None       Physical Exam  Temp:  [36.1 °C (97 °F)] 36.1 °C (97 °F)  Pulse:  [77] 77  Resp:  [19] 19  BP: (119)/(73) 119/73  SpO2:  [96 %] 96 %  Blood Pressure: 119/73   Temperature: 36.1 °C (97 °F)   Pulse: 77   Respiration: 19   Pulse Oximetry: 96 %       Physical Exam  Constitutional:       General: He is not in acute distress.     Appearance: He is not  "toxic-appearing.   HENT:      Head: Normocephalic and atraumatic.      Nose: Nose normal.      Mouth/Throat:      Mouth: Mucous membranes are moist.      Pharynx: Oropharynx is clear.   Eyes:      Extraocular Movements: Extraocular movements intact.      Conjunctiva/sclera: Conjunctivae normal.   Cardiovascular:      Rate and Rhythm: Normal rate and regular rhythm.      Pulses: Normal pulses.   Pulmonary:      Effort: Pulmonary effort is normal.      Breath sounds: Normal breath sounds. No wheezing or rales.   Abdominal:      General: Abdomen is flat.      Palpations: Abdomen is soft.      Comments: Large surgical scar along anterior costal margin well-healed   Musculoskeletal:         General: No swelling or deformity.      Cervical back: Normal range of motion and neck supple.   Skin:     General: Skin is warm and dry.      Capillary Refill: Capillary refill takes less than 2 seconds.   Neurological:      General: No focal deficit present.      Mental Status: He is oriented to person, place, and time.         Laboratory:          No results for input(s): \"ALTSGPT\", \"ASTSGOT\", \"ALKPHOSPHAT\", \"TBILIRUBIN\", \"DBILIRUBIN\", \"GAMMAGT\", \"AMYLASE\", \"LIPASE\", \"ALB\", \"PREALBUMIN\", \"GLUCOSE\" in the last 72 hours.      No results for input(s): \"NTPROBNP\" in the last 72 hours.      No results for input(s): \"TROPONINT\" in the last 72 hours.    Imaging:  OUTSIDE IMAGES-CT ABDOMEN /PELVIS   Final Result      OUTSIDE IMAGES-CT CHEST   Final Result      OUTSIDE IMAGES-US ABDOMEN   Final Result      OUTSIDE IMAGES-CT ABDOMEN /PELVIS   Final Result      IR-CONSULT AND TREAT    (Results Pending)       EKG:  I have personally reviewed the images and compared with prior images. and My impression is: Reviewed EKG from outside hospital, sinus rhythm with ventricular rate 81 no ST elevations    Assessment/Plan:  Justification for Admission Status  I anticipate this patient will require at least two midnights for appropriate medical " management, necessitating inpatient admission because possible intra-abdominal infection requiring IR surgical intervention    Patient will need a Med/Surg bed on MEDICAL service .  The need is secondary to above.    * Abdominal fluid collection  Assessment & Plan  Patient's status post left nephrectomy and partial pancreatectomy with Dr. Love on 9/2.  Has had decreased appetite, malaise, and abdominal pain since then.  Recently has been having episodes of chills as well as flushing and diaphoresis.  No recorded fevers.  Outside CT shows fluid collection and renal surgical bed abscess not excluded  -Continue antibiotics, have ordered Zosyn  -Getting labs in house, CBC, CMP, lactic acid, procalcitonin, lipase  -Will need urology consult in the morning, I have placed order for IR consult to consider drainage of the collection  -Pain control, antiemetics    Lung mass  Assessment & Plan  Patient's oncologist ordered CT of chest abdomen pelvis for further staging given his RCC  CT chest revealed 15 x 15 x 17 mm spiculated mass in inferior margin of the right upper lobe abutting anterior pleural surface  Suspicious for other primary malignancy rather than RCC metastasis  Patient was not aware of this finding, I discussed with him  Recommend biopsy    Acute pancreatitis, unspecified complication status, unspecified pancreatitis type- (present on admission)  Assessment & Plan  Elevated lipase at outside hospital however no inflammation of pancreas noted on imaging  Patient did have recent partial pancreatectomy, suspect elevated lipase is secondary to this rather than acute pancreatitis      Malignant neoplasm of left kidney (HCC)- (present on admission)  Assessment & Plan  Status post        VTE prophylaxis: SCDs/TEDs

## 2023-09-03 NOTE — PROGRESS NOTES
IR Nursing Note    Left nephrectomy bed drain placement by MD Ley assisted by RT Bhardwaj, left flank access site.  Procedure site was marked by MD and verified using imaging guidance.  Patient was placed in a prone position. Vitals were taken every 5 minutes and remained stable during procedure (see doc flow sheet for results).  CO2 waveform capnography was monitored and remained stable throughout procedure.  A gauze, medipore dressing was placed over surgical site.     15mL sample in syringe collected by MD Av, specimen delivered to lab by ALEJANDRA Miranda.    Report given to ALEJANDRA Cooley; patient transported to room via IR RN monitored, transferred care to report RN.     [email or web address] Flexima APDL, 12f x 25cm  REF:  [id number]  LOT:  [id number]  EXP:  5/4/26

## 2023-09-03 NOTE — PROGRESS NOTES
4 Eyes Skin Assessment Completed by Tonja, ALEJANDRA and ALEJANDRA Braxton.    Head WDL  Ears WDL  Nose WDL  Mouth WDL  Neck WDL  Breast/Chest Scab  Scattered discoloration and scabs  Shoulder Blades WDL  Spine WDL  (R) Arm/Elbow/Hand WDL  (L) Arm/Elbow/Hand WDL  Abdomen Scar and Incision  Lower abdominal scar from past surgical procedure  Groin WDL  Scrotum/Coccyx/Buttocks WDL  (R) Leg Scab and Bruising  (L) Leg Scab and Bruising  (R) Heel/Foot/Toe WDL  (L) Heel/Foot/Toe WDL          Devices In Place: N/A    Interventions In Place N/A    Possible Skin Injury No    Pictures Uploaded Into Epic N/A  Wound Consult Placed N/A  RN Wound Prevention Protocol Ordered No

## 2023-09-03 NOTE — HOSPITAL COURSE
Nghia Coffman Jr is a 63 y.o. male who presented 9/3/2023 with abdominal pain.  Patient has a history of renal cell carcinoma status post nephrectomy of the left kidney and partial pancreatectomy with Dr. Love.  He presented to [facility] after several days of anorexia, generalized pain localizing to the right mid back, as well as vomiting.  He has not had fever     Labs from [facility]  BUN 21, creatinine 1.6  Sodium 131, potassium 4.5, bicarb 28  Normal transaminases  WBC 8.4  Hemoglobin 12.2  Platelets 426     CT scan at the outside hospital showed complex 12 cm fluid collection in the left renal surgical bed, abscess could not be excluded.  CT of the chest shows 15 x 15 x 17 mm spiculated mass in the inferior margin of the right upper lobe abutting anterior pleural surface.  No edema or effusion.  No definite adenopathy.  Appearance of spiculated mass suggested primary malignancy rather than metastatic disease.     Patient was given 3.75 g of Zosyn and transferred here for need for urology and interventional radiology for SARAH drain placement   Seen by Dr. Portillo, see notes for details; abdominal fluid collection likely hematoma. SARAH drain removed, will discontinue antibiotics as this is unlikely to be infectious.    -Prior to discharge patient goals include: P.o. pain management p.o. antiemetics, and tolerating adequate p.o. intake  -Patient reports tolerating clear liquids, not able to tolerate regular diet (vomit x1), will initiate full liquids and reassess -tolerates, will discontinue TPN, DC PICC   -Continue with Megace  -Oncology appointment outpatient 9/14/2023

## 2023-09-03 NOTE — PROGRESS NOTES
[facility] Internal Medicine Daily Progress Note    Date of Service  9/3/2023    [facility] Team: [facility]   Attending: AZIZA Spears M.d.  Senior Resident: Dr. Amelia Banks MD  Intern:  Dr. Ana Gutierrez MD  Contact Number: [phone]    Chief Complaint  Nghia Coffman Jr is a 63 y.o. male admitted 9/3/2023 with     Hospital Course  Nghia Coffman Jr is a 63 y.o. male who presented 9/3/2023 with abdominal pain.  Patient has a history of renal cell carcinoma status post nephrectomy of the left kidney and partial pancreatectomy with Dr. Love.  He presented to [facility] after several days of anorexia, generalized pain localizing to the right mid back, as well as vomiting.  He has not had fever     Labs from [facility]  BUN 21, creatinine 1.6  Sodium 131, potassium 4.5, bicarb 28  Normal transaminases  WBC 8.4  Hemoglobin 12.2  Platelets 426     CT scan at the outside hospital showed complex 12 cm fluid collection in the left renal surgical bed, abscess could not be excluded.  CT of the chest shows 15 x 15 x 17 mm spiculated mass in the inferior margin of the right upper lobe abutting anterior pleural surface.  No edema or effusion.  No definite adenopathy.  Appearance of spiculated mass suggested primary malignancy rather than metastatic disease.     Patient was given 3.75 g of Zosyn and transferred here for need for urology and interventional radiology    Interval Problem Update  -No significant overnight events reported. Pt states he still has abdominal pain, but better overall.   -IR drained the fluid in the left surgical bed fluid collection and placed a drain. Cultures sent.   -Urology and oncology following the patient.   -Will order IR imaging guided biopsy of the lung mass after clearance from Dr. Chu.   -He denies having any vomiting episodes since yesterday, will restart PO diet and advance as tolerated.     I have discussed this patient's plan of care and discharge plan  at IDT rounds today with Case Management, Nursing, Nursing leadership, and other members of the IDT team.    Consultants/Specialty  oncology and urology    Code Status  Full Code    Disposition  The patient is not medically cleared for discharge to home or a post-acute facility.      I have placed the appropriate orders for post-discharge needs.    Review of Systems  Review of Systems   Constitutional:  Positive for malaise/fatigue and weight loss. Negative for chills, diaphoresis and fever.        Loss of appetite +   HENT: Negative.  Negative for congestion and sore throat.    Eyes: Negative.  Negative for blurred vision and double vision.   Respiratory: Negative.  Negative for cough, hemoptysis, sputum production, shortness of breath and wheezing.    Cardiovascular: Negative.  Negative for chest pain, palpitations, orthopnea, claudication and leg swelling.   Gastrointestinal:  Positive for abdominal pain, constipation (Has BMs every other day or so), nausea and vomiting (Not in the past 24 hours per pt). Negative for blood in stool, diarrhea, heartburn and melena.   Genitourinary:  Negative for dysuria and urgency.   Musculoskeletal:  Negative for back pain and falls.   Skin: Negative.    Neurological:  Negative for dizziness and headaches.   All other systems reviewed and are negative.       Physical Exam  Temp:  [36.1 °C (97 °F)-36.6 °C (97.9 °F)] 36.6 °C (97.9 °F)  Pulse:  [75-92] 86  Resp:  [13-19] 17  BP: (114-156)/(63-89) 114/71  SpO2:  [94 %-100 %] 99 %    Physical Exam  Vitals and nursing note reviewed.   Constitutional:       General: He is not in acute distress.     Appearance: Normal appearance. He is not ill-appearing, toxic-appearing or diaphoretic.   HENT:      Head: Normocephalic and atraumatic.      Nose: Nose normal.      Mouth/Throat:      Mouth: Mucous membranes are moist.      Pharynx: Oropharynx is clear.   Eyes:      Extraocular Movements: Extraocular movements intact.       Conjunctiva/sclera: Conjunctivae normal.      Pupils: Pupils are equal, round, and reactive to light.   Cardiovascular:      Rate and Rhythm: Normal rate and regular rhythm.      Pulses: Normal pulses.      Heart sounds: Normal heart sounds. No murmur heard.     No gallop.   Pulmonary:      Effort: Pulmonary effort is normal. No respiratory distress.      Breath sounds: Normal breath sounds. No wheezing or rales.   Chest:      Chest wall: No tenderness.   Abdominal:      General: Abdomen is flat. Bowel sounds are normal. There is no distension.      Palpations: Abdomen is soft. There is no mass.      Tenderness: There is no abdominal tenderness. There is left CVA tenderness. There is no guarding.      Comments: Large surgical scar along anterior costal margin well-healed;   No belkis's sign   Musculoskeletal:         General: No swelling or deformity. Normal range of motion.      Cervical back: Normal range of motion and neck supple.   Skin:     General: Skin is warm and dry.      Capillary Refill: Capillary refill takes less than 2 seconds.   Neurological:      General: No focal deficit present.      Mental Status: He is alert and oriented to person, place, and time. Mental status is at baseline.         Fluids    Intake/Output Summary (Last 24 hours) at 9/3/2023 1507  Last data filed at 9/3/2023 1400  Gross per 24 hour   Intake 100 ml   Output 35 ml   Net 65 ml       Laboratory  Recent Labs     09/03/23  0635   WBC 7.7   RBC 3.70*   HEMOGLOBIN 10.2*   HEMATOCRIT 32.5*   MCV 87.8   MCH 27.6   MCHC 31.4*   RDW 47.9   PLATELETCT 344   MPV 9.6     Recent Labs     09/03/23  0635   SODIUM 136   POTASSIUM 4.3   CHLORIDE 98   CO2 27   GLUCOSE 98   BUN 16   CREATININE 1.40   CALCIUM 9.0                   Imaging  DX-CHEST-2 VIEWS   Final Result      No acute process.      OUTSIDE IMAGES-CT ABDOMEN /PELVIS   Final Result      OUTSIDE IMAGES-CT CHEST   Final Result      OUTSIDE IMAGES-US ABDOMEN   Final Result      OUTSIDE  IMAGES-CT ABDOMEN /PELVIS   Final Result      CT-DRAIN-RENAL-PERIRENAL    (Results Pending)        Assessment/Plan  Problem Representation: 64yo male pt with PMHx of RCC S/P Left nephrectomy and partial pancreatectomy on 8/2/2023, was transferred from OSH due to CT evidence of fluid collection in the left nephrectomy surgical bed w/ some concerns for abscess. He is now S/P IR drain placement. Being followed by oncology and urology.     * Abdominal fluid collection  Assessment & Plan  Pt has hx of RCC left kidney, S/P left nephrectomy and partial pancreatectomy with Dr. Love on 8/2. Has had decreased appetite, malaise, and abdominal pain since then.  Recently has been having episodes of chills as well as flushing and diaphoresis.  No recorded fevers.  Outside CT shows fluid collection and renal surgical bed abscess not excluded.  -IR performed imaging guided drainage of the fluid and placement of drain on 9/3.   -Urology consulted, recommend continuing monitoring of drain output, follow cultures, and consideration of repeat imaging in the next few days for looking interval change  -Continue antibiotics, Zosyn started on 9/3-  -Pain control, antiemetics ordered   -Advance diet as tolerated    -Oncology consulted, pt was supposed to see Dr. Barclay next Thursday for initiation of systemic therapy. Will need close outpatient f/u after dc.     Lung mass  Assessment & Plan  -CT chest done at OSH prior to transfer revealed 15 x 15 x 17 mm spiculated mass in inferior margin of the right upper lobe abutting anterior pleural surface of the lung. Could be metastasis from RCC vs synchronous lung primary.   -Patient was not aware of this finding, was discussed at admission and after that.   -Oncology Dr. Barclay is following the patient, recommend imaging guided biopsy of the lung mass with IR, preferably while inpatient to expedite treatment.   -Will order IR imaging guided biopsy of the lung mass after clearance from  Dr. Chu.    Malignant neoplasm of left kidney (HCC)- (present on admission)  Assessment & Plan  -Pt has hx of RCC left kidney, S/P left nephrectomy and partial pancreatectomy with Dr. Love on 8/2.  -Needs outpatient f/u with Dr. Barclay from [facility] that he has seen in the past. Was supposed to have an appt on 9/7 for initiation of systemic therapy.   -Seen by Dr. Barclay while inpatient as well.     Acquired solitary kidney- (present on admission)  Assessment & Plan  -Pt is s/p complete left nephrectomy on 8/2/2023, performed by Dr. Love. Indication was RCC of the left kidney.   -SCr this admission is 1.4, normal but slightly higher than baseline SCr of 1.1-1.3.   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 Pt is S/P left nephrectomy and partial pancreatectomy with Dr. Love on 8/2.    Acute pancreatitis, unspecified complication status, unspecified pancreatitis type- (present on admission)  Assessment & Plan  -Elevated lipase at outside hospital however no inflammation of pancreas noted on imaging  -Patient did have recent partial pancreatectomy, suspect elevated lipase is secondary to this rather than acute pancreatitis  -Will continue gentle IVF for now and advance diet as tolerated.   -Lipase level pending from this admission.          VTE prophylaxis: SCDs/TEDs and heparin ppx    I have performed a physical exam and reviewed and updated ROS and Plan today (9/3/2023). In review of yesterday's note (9/2/2023), there are no changes except as documented above.

## 2023-09-03 NOTE — CONSULTS
Consult Note: Hematology/Oncology    Date of consultation: 9/3/2023 2:26 PM    Referring provider: Ricky Spears    Reason for consultation: RCC      History of presenting illness:   Mr. Cui is a 62yearold  gentleman with no significant past history started to notice left flank pain posterior bottom of  the ribs approximately 3 months ago initially it was 12 out of 10 but was persistent pain after 3 weeks patient went to his primary  care physician who did a urine test followed by ultrasound of the kidneys. Patient denied any gross hematuria at that time.  Ultrasound of the kidney showed a left kidney large mass subsequently was referred for a CT abdomen which was done on  6/29/2023, which showed a 18 cm mass arising from the upper lobe of the left kidney highly suspicious for primary renal cell  carcinoma, with significant local masseffect on the stomach and pancreas. Local adenopathy and possible small hepatic  metastasis single large hepatic mass is more likely a hemangioma. Small amount of ascites in the deep pelvic nonspecific. Patient  was referred to urology and Dr. 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 Part of  the pancreas was also positive for malignancy at the margin pancreatic body biopsy positive for malignancy carcinoma with  sarcomatous differentiation. 1 out of 11 lymph nodes positive for the pathological staging PT4PN1 ( stage IV) renal cell carcinoma.  Patient is referred to medical oncology    Patient is established with me as outpatient I have seen him and discussed his care with [facility]. Han at [facility] due to chromophobe features and aggressive disease and margins positive patient disease is considered stage IV treatment recommendations are to start Keytruda every 6 weeks and Lenvima which were arranged as  outpatient but in the interim patient presented to the hospital with abdominal pain dehydration again is currently noted to have 15 cm fluid accumulation in the postoperative area on the CT scan.  Patient completed CT-guided drainage there was no pus cultures are pending this is consistent with seroma.  Patient is currently on antibiotic pain has significantly improved after the procedure currently 1 out of 10.  Patient wife is at bedside.    Past Medical History:    Past Medical History:   Diagnosis Date    Anesthesia     Bowel habit changes     constipated    Breath shortness     Cancer (HCC)     kindey    Delayed emergence from general anesthesia     Disorder of thyroid     partial thyroid ectomy    Indigestion     Pain     back,abdominal    Pneumonia 2000    Renal disorder     Sleep apnea     cpap       Past surgical history:    Past Surgical History:   Procedure Laterality Date    [place] REMV KIDNEY,RADICAL  8/2/2023    Procedure: OPEN LEFT RADICAL NEPHRECTOMY;  Surgeon: Guy Jackson M.D.;  Location: SURGERY [facility];  Service: Urology    BIOPSY, PANCREAS  8/2/2023    Procedure: BIOPSY, PANCREAS, OPEN;  Surgeon: Farhan Portillo M.D.;  Location: SURGERY [facility];  Service: General    THYROID LOBECTOMY  2018    INGUINAL HERNIA REPAIR  2018    SINUSCOPY  1988    polyp removal       Allergies:  Seasonal    Medications:    Current Facility-Administered Medications   Medication Dose Route Frequency Provider Last Rate Last Admin    piperacillin-tazobactam (Zosyn) 3.375 g in  mL IVPB  3.375 g Intravenous Q8HRS Kody Arenas M.D.   Stopped at 09/03/23 1301    HYDROmorphone (Dilaudid) injection 0.5 mg  0.5 mg Intravenous Q3HRS PRN Kody Arenas M.D.        ondansetron (Zofran) syringe/vial injection 4 mg  4 mg Intravenous Q4HRS PRN Kody Arenas M.D.   4 mg at 09/03/23 1135       Social History:     Social History     Socioeconomic History    Marital status:      Spouse  "name: Not on file    Number of children: Not on file    Years of education: Not on file    Highest education level: Not on file   Occupational History    Not on file   Tobacco Use    Smoking status: Never    Smokeless tobacco: Never   Vaping Use    Vaping Use: Never used   Substance and Sexual Activity    Alcohol use: Yes     Alcohol/week: 1.2 oz     Types: 2 Standard drinks or equivalent per week    Drug use: Never    Sexual activity: Not on file   Other Topics Concern    Not on file   Social History Narrative    Not on file     Social Determinants of Health     Financial Resource Strain: Not on file   Food Insecurity: Not on file   Transportation Needs: Not on file   Physical Activity: Not on file   Stress: Not on file   Social Connections: Not on file   Intimate Partner Violence: Not on file   Housing Stability: Not on file       Family History:   History reviewed. No pertinent family history.    Review of Systems:   All other review of systems are negative except what was mentioned above in the HPI.    Constitutional: No fever, + chills,++ weight loss ,++malaise/fatigue.    HEENT: No new auditory or visual complaints. No sore throat and neck pain.     Respiratory:No new cough, sputum production, shortness of breath and wheezing.    Cardiovascular: No new chest pain, palpitations, orthopnea and leg swelling.    Gastrointestinal: No heartburn, nausea, vomiting ,abdominal pain, hematochezia or melena     Genitourinary: Negative for dysuria, hematuria    Musculoskeletal: No new arthralgias or myalgias   Skin: Negative for rash and itching.    Neurological: Negative for focal weakness or headaches.    Endo/Heme/Allergies: No abnormal bleed/bruise.    Psychiatric/Behavioral: No new depression/anxiety.    Physical Exam:   Vitals:   /71   Pulse 86   Temp 36.6 °C (97.9 °F) (Temporal)   Resp 17   Ht 1.829 m (6' 0.01\")   Wt 83 kg (183 lb)   SpO2 99%   BMI 24.81 kg/m²     General: Not in acute distress, alert " and oriented x 3  HEENT: No pallor, icterus. Oropharynx clear.   Neck: Supple, no palpable masses.  Lymph nodes: No palpable cervical, supraclavicular, axillary or inguinal lymphadenopathy.    CVS: regular rate and rhythm, no rubs or gallops  RESP: Clear to auscultate bilaterally, no wheezing or crackles.   ABD: Soft, non tender, non distended, positive bowel sounds, no palpable organomegaly. Drain on left side.   EXT: No edema or cyanosis  CNS: Alert and oriented x3, No focal deficits.  Skin- No rash      Labs:   Recent Labs     09/03/23  0635   RBC 3.70*   HEMOGLOBIN 10.2*   HEMATOCRIT 32.5*   PLATELETCT 344     Lab Results   Component Value Date/Time    SODIUM 136 09/03/2023 06:35 AM    POTASSIUM 4.3 09/03/2023 06:35 AM    CHLORIDE 98 09/03/2023 06:35 AM    CO2 27 09/03/2023 06:35 AM    GLUCOSE 98 09/03/2023 06:35 AM    BUN 16 09/03/2023 06:35 AM    CREATININE 1.40 09/03/2023 06:35 AM        Assessment and Plan:      # [id number] Fluid collection surgical bed abscess versus seroma cultures pending pain significantly improved after drainage.  Continue with antibiotics until cultures come back negative but fluid drain looks more likely seroma.  Appreciate urology recommendations.    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 There was an incidental finding of 15 x 17 x 17 mm mass in the right upper lobe suggestive of a different primary malignancy versus metastatic disease.  - I would recommend CT-guided biopsy of the right upper lobe during this hospitalization to rule out metastatic disease versus primary pulmonary malignancy this would help in expediting patient care and treatment.  - Patient will start on KEYTRUDA and LENVATINIB Upon  discharge and recovery which may take approximately 2 weeks from today I will arrange for this in the clinic.    # 15 x 17 x 17 mm mass in the right upper lobe suggestive of a different primary malignancy versus metastatic disease.  - Please obtain CT-guided biopsy of the left lung lesion to expedite patient care and differentiate between metastatic versus another primary    # Anemia likely of Chronic Disease    # CKD status post nephrectomy    Oncology will be on standby please call us back with any questions.     He agreed and verbalized his agreement and understanding with the current plan.  I answered all questions and concerns he has at this time.    Please note that this dictation was created using voice recognition software. I have made every reasonable attempt to correct obvious errors, but I expect that there are errors of grammar and possibly content that I did not discover before finalizing the note.      SIGNATURES:  Suki Barclay M.D.

## 2023-09-03 NOTE — ASSESSMENT & PLAN NOTE
-Pt has hx of RCC left kidney, S/P left nephrectomy and partial pancreatectomy with Dr. Love on 8/2.  -Needs outpatient f/u with Dr. Barclay from [facility] that he has seen in the past. Was supposed to have an appt on 9/7 for initiation of systemic therapy.   -Seen by Dr. Barclay while inpatient as well.

## 2023-09-03 NOTE — PROGRESS NOTES
Brief transfer acceptance note:   63-year-old male history of renal cell carcinoma status post left nephrectomy , partial pancreectomy presents to ER with abdominal and back pain found to have lipase 3 times above upper limit.  CT scan done earlier in the week showing 12 x 12 x 12 fluid collection surgical bed, abscess not excluded.  Patient given IV Zosyn prior to arrival.  Patient not septic.  High level of care requested for IR consultation versus general surgery.     Full code.

## 2023-09-03 NOTE — CONSULTS
[facility] [place] Consult/H&P Note      Attending: AZIZA Spears M.D.  Patient's Name/MRN: Nghia Coffman Jr, [id number]    Admit Date:9/3/2023  Today's Date: 9/3/2023   Length of stay:  LOS: 0 days   Room #: S525/02      Reason for consult/chief complaint: fluid collection in left nephrectomy bed  ID/HPI: Nghia Coffman Jr is a 63 y.o. male patient underwent open radical left nephrectomy with open pancreatic biopsy on 8/2/2023 with findings of pT4N1 chromophobe RCC.  Tumor at that time had invaded into the adrenal gland, soft tissue, pancreas, vascular spaces with positive lymph nodes.  The patient was experiencing generalized abdominal pain, nausea, vomiting, decreased appetite.  He presented to [facility] for these complaints and was later transferred to [facility] for higher levels of care.  CT scan demonstrated large fluid collection in nephrectomy bed along with 15 x 17 x 17 mass in right upper lobe suggestive of primary malignancy.  He underwent IR placement of drain into the left nephrectomy bed on 9/3.  Fluid appears to be serosanguineous, not consistent with infection.  White count is 7.7, creatinine 1.4.  He was started on Zosyn       Past Medical History:   Past Medical History:   Diagnosis Date    Anesthesia     Bowel habit changes     constipated    Breath shortness     Cancer (HCC)     joeey    Delayed emergence from general anesthesia     Disorder of thyroid     partial thyroid ectomy    Indigestion     Pain     back,abdominal    Pneumonia 2000    Renal disorder     Sleep apnea     cpap        Past Surgical History:   Past Surgical History:   Procedure Laterality Date    DC REMV KIDNEY,RADICAL  8/2/2023    Procedure: OPEN LEFT RADICAL NEPHRECTOMY;  Surgeon: Guy Jackson M.D.;  Location: SURGERY [facility];  Service: Urology    BIOPSY, PANCREAS  8/2/2023    Procedure: BIOPSY, PANCREAS, OPEN;  Surgeon: Farhan Portillo M.D.;  Location: SURGERY [facility];  Service:  "General    THYROID LOBECTOMY  2018    INGUINAL HERNIA REPAIR  2018    SINUSCOPY  1988    polyp removal        Family History:   History reviewed. No pertinent family history.      Social History:   Social History     Tobacco Use    Smoking status: Never    Smokeless tobacco: Never   Vaping Use    Vaping Use: Never used   Substance Use Topics    Alcohol use: Yes     Alcohol/week: 1.2 oz     Types: 2 Standard drinks or equivalent per week    Drug use: Never      Social History     Social History Narrative    Not on file        Allergies: he Seasonal    Medications:   Medications Prior to Admission   Medication Sig Dispense Refill Last Dose    traMADol (ULTRAM) 50 MG Tab Take 50 mg by mouth every four hours as needed for Moderate Pain.   9/1/2023 at 2200    enoxaparin (LOVENOX) 30 MG/0.3ML Solution Prefilled Syringe inj Inject 40 mg under the skin every 12 hours.   9/1/2023 at 2100    ondansetron (ZOFRAN ODT) 4 MG TABLET DISPERSIBLE Take 4 mg by mouth every 6 hours as needed for Nausea/Vomiting.   9/2/2023 at 2100    metoclopramide (REGLAN) 10 MG Tab Take 10 mg by mouth every 6 hours as needed. Indications: Nausea and Vomiting   9/1/2023 at 2100    oxyCODONE-acetaminophen (PERCOCET-10)  MG Tab Take 1 Tablet by mouth every four hours as needed for Severe Pain.   9/1/2023 at 2100         Review of Systems  Review of Systems   Constitutional:  Positive for malaise/fatigue and weight loss.   Gastrointestinal:  Positive for abdominal pain, nausea and vomiting.   Genitourinary:  Positive for flank pain.        Physical Exam  VITAL SIGNS: /71   Pulse 86   Temp 36.6 °C (97.9 °F) (Temporal)   Resp 17   Ht 1.829 m (6' 0.01\")   Wt 83 kg (183 lb)   SpO2 99%   BMI 24.81 kg/m²   Physical Exam  Vitals and nursing note reviewed.   Constitutional:       Appearance: Normal appearance.   HENT:      Head: Normocephalic and atraumatic.      Nose: Nose normal.      Mouth/Throat:      Mouth: Mucous membranes are moist. " "  Skin:     General: Skin is warm and dry.   Neurological:      General: No focal deficit present.      Mental Status: He is alert and oriented to person, place, and time.   Psychiatric:         Mood and Affect: Mood normal.         Behavior: Behavior normal.           Labs:  Recent Labs     09/03/23  0635   WBC 7.7   RBC 3.70*   HEMOGLOBIN 10.2*   HEMATOCRIT 32.5*   MCV 87.8   MCH 27.6   MCHC 31.4*   RDW 47.9   PLATELETCT 344   MPV 9.6     Recent Labs     09/03/23  0635   SODIUM 136   POTASSIUM 4.3   CHLORIDE 98   CO2 27   GLUCOSE 98   BUN 16   CREATININE 1.40   CALCIUM 9.0         Glucose:  Recent Labs     09/03/23  0635   GLUCOSE 98     Coags:  No results for input(s): \"INR\" in the last 72 hours.      Urinalysis:   No results for input(s): \"COLORURINE\", \"CLARITY\", \"SPECGRAVITY\", \"PHURINE\", \"GLUCOSEUR\", \"KETONES\", \"NITRITE\", \"OCCULTBLOOD\", \"RBCURINE\", \"BACTERIA\", \"EPITHELCELL\" in the last 72 hours.    Invalid input(s): \"BILRUBINUR\", \"LEUESTERASE\"    Imaging:  DX-CHEST-2 VIEWS   Final Result      No acute process.      OUTSIDE IMAGES-CT ABDOMEN /PELVIS   Final Result      OUTSIDE IMAGES-CT CHEST   Final Result      OUTSIDE IMAGES-US ABDOMEN   Final Result      OUTSIDE IMAGES-CT ABDOMEN /PELVIS   Final Result      CT-DRAIN-RENAL-PERIRENAL    (Results Pending)       @lastct@     Assessment/Recommendation   63 y.o.M s/p open radical left nephrectomy 08/2023 with path demonstrating pT4N1 chromophobe RCC and large fluid collection in left nephrectomy bed c/w post op seroma. Now s/p IR placement of drain into nephrectomy bed 9/3.     Monitor drain output. Hopeful this will improve GI symptoms. Await culture results and continue empiric IV abx. Plan for repeat imaging in a few days to assess that flluid collection has resolved.     Thank you for this consult. Plan of care directed by Dr. Bolivar. Case discussed with patient, urology, and spouse. Please contact us with questions.             BRANDIE WilderC.   [id number]  [place] [id number]   [phone]

## 2023-09-04 NOTE — PROGRESS NOTES
[facility] Internal Medicine Daily Progress Note    Date of Service  9/4/2023    [facility] Team: [facility] Team   Attending: AZIZA Spears M.d.  Senior Resident: Dr. Amelia Banks MD  Intern:  Dr. Ana Gutierrez MD  Contact Number: [phone]    Chief Complaint  Nghia Coffman Jr is a 63 y.o. male admitted 9/3/2023 with     Hospital Course  Nghia Coffman Jr is a 63 y.o. male who presented 9/3/2023 with abdominal pain.  Patient has a history of renal cell carcinoma status post nephrectomy of the left kidney and open pancreatic biopsy with Dr. Love/  on 8/2/2023. He presented to [facility] after several days of anorexia, generalized pain localizing to the right mid back, as well as vomiting.  He has not had fever at home.      Labs from [facility]  BUN 21, creatinine 1.6  Sodium 131, potassium 4.5, bicarb 28  Normal transaminases  WBC 8.4  Hemoglobin 12.2  Platelets 426     CT scan at the outside hospital showed complex 12 cm fluid collection in the left renal surgical bed, abscess could not be excluded.  CT of the chest shows 15 x 15 x 17 mm spiculated mass in the inferior margin of the right upper lobe abutting anterior pleural surface.  No edema or effusion.  No definite adenopathy.  Appearance of spiculated mass suggested primary malignancy rather than metastatic disease.    On 9/3, IR drained the fluid in the left surgical bed fluid collection and placed a drain. Cultures sent.      Patient was given 3.75 g of Zosyn and transferred here for need for urology and interventional radiology    Interval Problem Update  -No significant overnight events reported. Denies vomiting episodes since admission, but does have persistent nausea and abdominal pain that are unchanged since yesterday.   -Drain output was 30ml at time of the procedure and another 30ml over the past 24 hours.   -Fluid amylase was notably elevated at >1000. Sent . This is likely due to leakage of fluid  from the pancreatic biopsy site that may have drained into the surgical bed.   -Urology and oncology following the patient.   -Will order IR imaging guided biopsy of the lung mass after clearance from Dr. Chu.   -He denies having any vomiting episodes since yesterday, will restart PO diet and advance as tolerated. Will continue fluids since Lipase is still persistently elevated.     I have discussed this patient's plan of care and discharge plan at IDT rounds today with Case Management, Nursing, Nursing leadership, and other members of the IDT team.    Consultants/Specialty  oncology and urology    Code Status  Full Code    Disposition  The patient is not medically cleared for discharge to home or a post-acute facility.      I have placed the appropriate orders for post-discharge needs.    Review of Systems  Review of Systems   Constitutional:  Positive for malaise/fatigue and weight loss. Negative for chills, diaphoresis and fever.        Loss of appetite +   HENT: Negative.  Negative for congestion and sore throat.    Eyes: Negative.  Negative for blurred vision and double vision.   Respiratory: Negative.  Negative for cough, hemoptysis, sputum production, shortness of breath and wheezing.    Cardiovascular: Negative.  Negative for chest pain, palpitations, orthopnea, claudication and leg swelling.   Gastrointestinal:  Positive for abdominal pain, constipation (Has BMs every other day or so) and nausea. Negative for blood in stool, diarrhea, heartburn, melena and vomiting (Not in the past 24 hours per pt).        Denies vomiting episodes since admission, but does have persistent nausea and abdominal pain that are unchanged since yesterday.    Genitourinary:  Negative for dysuria and urgency.   Musculoskeletal:  Negative for back pain and falls.   Skin: Negative.    Neurological:  Negative for dizziness and headaches.   All other systems reviewed and are negative.       Physical Exam  Temp:  [36.1 °C (97 °F)-36.6  °C (97.8 °F)] 36.3 °C (97.4 °F)  Pulse:  [73-86] 81  Resp:  [17-18] 17  BP: (104-131)/(67-84) 113/83  SpO2:  [92 %-96 %] 96 %    Physical Exam  Vitals and nursing note reviewed.   Constitutional:       General: He is not in acute distress.     Appearance: Normal appearance. He is not ill-appearing, toxic-appearing or diaphoretic.   HENT:      Head: Normocephalic and atraumatic.      Nose: Nose normal.      Mouth/Throat:      Mouth: Mucous membranes are moist.      Pharynx: Oropharynx is clear.   Eyes:      Extraocular Movements: Extraocular movements intact.      Conjunctiva/sclera: Conjunctivae normal.      Pupils: Pupils are equal, round, and reactive to light.   Cardiovascular:      Rate and Rhythm: Normal rate and regular rhythm.      Pulses: Normal pulses.      Heart sounds: Normal heart sounds. No murmur heard.     No gallop.   Pulmonary:      Effort: Pulmonary effort is normal. No respiratory distress.      Breath sounds: Normal breath sounds. No wheezing or rales.   Chest:      Chest wall: No tenderness.   Abdominal:      General: Abdomen is flat. Bowel sounds are normal. There is no distension.      Palpations: Abdomen is soft. There is no mass.      Tenderness: There is abdominal tenderness (diffuse). There is left CVA tenderness. There is no guarding.      Comments: Large surgical scar along anterior costal margin well-healed;   No belkis's sign   Musculoskeletal:         General: No swelling or deformity. Normal range of motion.      Cervical back: Normal range of motion and neck supple.   Skin:     General: Skin is warm and dry.      Capillary Refill: Capillary refill takes less than 2 seconds.   Neurological:      General: No focal deficit present.      Mental Status: He is alert and oriented to person, place, and time. Mental status is at baseline.         Fluids    Intake/Output Summary (Last 24 hours) at 9/4/2023 1519  Last data filed at 9/4/2023 0500  Gross per 24 hour   Intake --   Output 1785 ml    Net -1785 ml         Laboratory  Recent Labs     09/03/23  0635 09/04/23  0619   WBC 7.7 8.4   RBC 3.70* 3.90*   HEMOGLOBIN 10.2* 10.8*   HEMATOCRIT 32.5* 33.7*   MCV 87.8 86.4   MCH 27.6 27.7   MCHC 31.4* 32.0*   RDW 47.9 47.1   PLATELETCT 344 354   MPV 9.6 9.4       Recent Labs     09/03/23  0635 09/04/23  0619   SODIUM 136 137   POTASSIUM 4.3 4.0   CHLORIDE 98 102   CO2 27 24   GLUCOSE 98 140*   BUN 16 9   CREATININE 1.40 1.15   CALCIUM 9.0 8.5                     Imaging  CT-DRAIN-RENAL-PERIRENAL   Final Result      1.  CT GUIDED PLACEMENT OF A PERCUTANEOUS DRAINAGE CATHETER IN A LEFT NEPHRECTOMY BED FLUID COLLECTION.      2.  THE CURRENT PLAN IS TO MONITOR DRAINAGE OUTPUT AND OBTAIN A FOLLOWUP CT SCAN IN 5-7 DAYS IF CLINICALLY INDICATED.      DX-CHEST-2 VIEWS   Final Result      No acute process.      OUTSIDE IMAGES-CT ABDOMEN /PELVIS   Final Result      OUTSIDE IMAGES-CT CHEST   Final Result      OUTSIDE IMAGES-US ABDOMEN   Final Result      OUTSIDE IMAGES-CT ABDOMEN /PELVIS   Final Result             Assessment/Plan  Problem Representation: 64yo male pt with PMHx of RCC S/P Left nephrectomy and partial pancreatectomy on 8/2/2023, was transferred from OSH due to CT evidence of fluid collection in the left nephrectomy surgical bed w/ some concerns for abscess. He is now S/P IR drain placement. Being followed by oncology and urology.     * Abdominal fluid collection  Assessment & Plan  Pt has hx of RCC left kidney, S/P left nephrectomy and partial pancreatectomy with Dr. Love on 8/2. Has had decreased appetite, malaise, and abdominal pain since then.  Recently has been having episodes of chills as well as flushing and diaphoresis.  No recorded fevers.  Outside CT shows fluid collection and renal surgical bed abscess not excluded.  -IR performed imaging guided drainage of the fluid and placement of drain on 9/3.   -Urology consulted, recommend continuing monitoring of drain output, follow cultures, and  consideration of repeat imaging in the next few days for looking interval change  -Continue antibiotics, Zosyn started on 9/3-  -Pain control, antiemetics ordered   -Advance diet as tolerated    -Oncology consulted, pt was supposed to see Dr. Barclay next Thursday for initiation of systemic therapy. Will need close outpatient f/u after dc.     Lung mass  Assessment & Plan  -CT chest done at OSH prior to transfer revealed 15 x 15 x 17 mm spiculated mass in inferior margin of the right upper lobe abutting anterior pleural surface of the lung. Could be metastasis from RCC vs synchronous lung primary.   -Patient was not aware of this finding, was discussed at admission and after that.   -Oncology Dr. Barclay is following the patient, recommend imaging guided biopsy of the lung mass with IR, preferably while inpatient to expedite treatment.   -Will order IR imaging guided biopsy of the lung mass after clearance from Dr. Chu.    Acute pancreatitis, unspecified complication status, unspecified pancreatitis type- (present on admission)  Assessment & Plan  -Elevated lipase at outside hospital however no inflammation of pancreas noted on CT abdomen. Patient did have recent open pancreatic biopsy during left radical nephrectomy -- ?persistent pancreatitis since then.   -Lipase level was noted to be elevated this admission as well.   -Will continue gentle IVF for now and advance diet as tolerated.       Malignant neoplasm of left kidney (HCC)- (present on admission)  Assessment & Plan  -Pt has hx of RCC left kidney, S/P left nephrectomy and partial pancreatectomy with Dr. Love on 8/2.  -Needs outpatient f/u with Dr. Barclay from [facility] that he has seen in the past. Was supposed to have an appt on 9/7 for initiation of systemic therapy.   -Seen by Dr. Barclay while inpatient as well.     Acquired solitary kidney- (present on admission)  Assessment & Plan  -Pt is s/p complete left nephrectomy on 8/2/2023, performed by  Dr. Love. Indication was RCC of the left kidney.   -SCr this admission is 1.4, normal but slightly higher than baseline SCr of 1.1-1.3.   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 Pt is S/P left nephrectomy and partial pancreatectomy with Dr. Love on 8/2.         VTE prophylaxis: SCDs/TEDs and heparin ppx    I have performed a physical exam and reviewed and updated ROS and Plan today (9/4/2023). In review of yesterday's note (9/3/2023), there are no changes except as documented above.

## 2023-09-04 NOTE — PROGRESS NOTES
Note to reader: this note follows the APSO format rather than the historical SOAP format. Assessment and plan located at the top of the note for ease of use.    Chief Complaint  63 y.o. year old male here with N/V.    Assessment/Plan  Interval History   63 y.o.M s/p open radical left nephrectomy and pancreatic biopsy 08/2023 with path demonstrating pT4N1 chromophobe RCC and large fluid collection in left nephrectomy bed c/w post op seroma. Now s/p IR placement of drain into nephrectomy bed 9/3.     Plan:  - Await drain cultures, continue IV abx  - Hospitalist aware of elevated fluid amlyase from SARAH and suspects this is d/t pancreatitis  - Urology will continue to follow and will consider repeat imaging in a few days to ensure fluid collection has resolved    Patient seen and examined    9/4. Somewhat improving appetite however continued N/V. SARAH output serosanguinous 70cc/24hrs. Fluid amylase elevated at 1052          Review of Systems  Physical Exam   Review of Systems   Constitutional:  Negative for chills and fever.   Gastrointestinal:  Positive for abdominal pain, nausea and vomiting.   Genitourinary:  Negative for frequency, hematuria and urgency.     Vitals:    09/03/23 1600 09/03/23 1904 09/04/23 0355 09/04/23 0827   BP: 121/75 104/67 131/84 130/80   Pulse: 73 83 86 76   Resp: 17 18 17 17   Temp: 36.1 °C (97 °F) 36.1 °C (97 °F) 36.6 °C (97.8 °F) 36.3 °C (97.4 °F)   TempSrc: Temporal Temporal Temporal Temporal   SpO2: 92% 96% 96% 96%   Weight:       Height:         Physical Exam  Vitals and nursing note reviewed.   Constitutional:       Appearance: Normal appearance.   HENT:      Head: Normocephalic and atraumatic.   Pulmonary:      Effort: Pulmonary effort is normal.   Abdominal:      Comments: SARAH with serosanguinous output   Neurological:      Mental Status: He is alert.   Psychiatric:         Mood and Affect: Mood normal.         Behavior: Behavior normal.          Hematology Chemistry   Lab Results    Component Value Date/Time    WBC 8.4 09/04/2023 06:19 AM    HEMOGLOBIN 10.8 (L) 09/04/2023 06:19 AM    HEMATOCRIT 33.7 (L) 09/04/2023 06:19 AM    PLATELETCT 354 09/04/2023 06:19 AM     Lab Results   Component Value Date/Time    SODIUM 137 09/04/2023 06:19 AM    POTASSIUM 4.0 09/04/2023 06:19 AM    CHLORIDE 102 09/04/2023 06:19 AM    CO2 24 09/04/2023 06:19 AM    GLUCOSE 140 (H) 09/04/2023 06:19 AM    BUN 9 09/04/2023 06:19 AM    CREATININE 1.15 09/04/2023 06:19 AM         Labs not explicitly included in this progress note were reviewed by the author.   Radiology/imaging not explicitly included in this progress note was reviewed by the author.     Labs reviewed and Medications reviewed

## 2023-09-04 NOTE — PROGRESS NOTES
"Received alert and oriented x 4. Check vitals sign and recorded accordingly and due med given per MAR. Monitor sign and symptoms of respiratory distress and treatment given accordingly per MAR.Medicated per MAR and reassessed every 2 hours per protocol. Call light within reach. Bed alarm in placed. Needs attended. Will continue to monitor./67   Pulse 83   Temp 36.1 °C (97 °F) (Temporal)   Resp 18   Ht 1.829 m (6' 0.01\")   Wt 83 kg (183 lb)   SpO2 96%   BMI 24.81 kg/m² .   SARAH drain in placed and dry and intact with serosanguinous. Instructed npo at midnight for Lung biopsy.   "

## 2023-09-04 NOTE — PROGRESS NOTES
MD at bedside and updated on plan of care.  Rec'd report from night shift RN. Assumed pt care. Pt assessment completed and pt is AA&Ox4. Pt complains of pain 1/10 to back and medicated per MAR PRN orders. Pt on RA and vss.  and SARAH drain in place. All needs met. Bed locked, bed in lowest position, call light and personal belongings within reach and treaded socks in place for safety. Hourly rounding in place.

## 2023-09-05 NOTE — DIETARY
Nutrition Update:    Day 2 of admit.  Nghia Coffman Jr is a 63 y.o. male with admitting DX of Abdominal pain, Acute pancreatitis, unspecified complication status.  Patient being followed to optimize nutrition.    Pt receiving a Regular diet with Boost Plus supplements. One meal recorded 9/4 = 25-50%. Visited pt and wife at bedside. Pt states he is not eating well due to no appetite and feeling full. Observed pt did eat all of the entree at lunch today which wife stated was more than he has eaten recently. Pt states he will drink Boost, but he requests vanilla flavor only.    Problem: Nutritional:  Goal: Achieve adequate nutritional intake  Description: Patient will consume >50% of meals  Outcome: Not met    Recommendations/Plan:  Vanilla Boost Plus with all meals.   Encourage intake of meals and supplements >50%.  Document intake of all meals as % taken in ADL's to provide interdisciplinary communication across all shifts.   Monitor weight.  Nutrition rep will continue to see patient for ongoing meal and snack preferences.    RD following

## 2023-09-05 NOTE — CARE PLAN
The patient is Stable - Low risk of patient condition declining or worsening    Shift Goals  Clinical Goals: IV abx, monitor SARAH drain and pain control  Patient Goals: comfort and go home  Family Goals: To recieve plan of care    Progress made toward(s) clinical / shift goals:  Pt medicated for pain 5/10 per MAR PRN order. Upon reassessment, pt verbalizes pain 1/10 and able to rest comfortably.    Problem: Knowledge Deficit - Standard  Goal: Patient and family/care givers will demonstrate understanding of plan of care, disease process/condition, diagnostic tests and medications  Outcome: Progressing     Problem: Pain - Standard  Goal: Alleviation of pain or a reduction in pain to the patient’s comfort goal  Outcome: Progressing       Patient is not progressing towards the following goals:

## 2023-09-05 NOTE — PROGRESS NOTES
Note to reader: this note follows the APSO format rather than the historical SOAP format. Assessment and plan located at the top of the note for ease of use.    Chief Complaint  63 y.o. year old male here with N/V.    Assessment/Plan  Interval History   63 y.o.M s/p open radical left nephrectomy and pancreatic biopsy 08/2023 with path demonstrating pT4N1 chromophobe RCC and large fluid collection in left nephrectomy bed c/w post op seroma. Now s/p IR placement of drain into nephrectomy bed 9/3.     Plan:  - Continue to follow drain cx and output, continue IV abx  - Hospitalist aware of elevated fluid amlyase from SARAH and suspects this is d/t pancreatitis  - Repeat CT A/P without contrast ordered for tomorrow am to reassess fluid collection with drain in place  - Urology will continue to follow      Patient seen and examined    9/5. Sitting up in bed, denies pain but reports worsening nausea and RANDY. Reports hospitalist has discussed TPN.     9/4. Somewhat improving appetite however continued N/V. SARAH output serosanguinous 70cc/24hrs. Fluid amylase elevated at 1052          Review of Systems  Physical Exam   Review of Systems   Constitutional:  Negative for chills and fever.   Gastrointestinal:  Positive for abdominal pain, nausea and vomiting.   Genitourinary:  Negative for frequency, hematuria and urgency.     Vitals:    09/04/23 1650 09/04/23 2039 09/05/23 0411 09/05/23 0816   BP: 137/81 119/79 138/77 126/75   Pulse: 91 92 98    Resp: 17 18 18 20   Temp: 36.6 °C (97.9 °F) 36.7 °C (98 °F) 36.4 °C (97.6 °F) 36.6 °C (97.9 °F)   TempSrc: Temporal Temporal Temporal Temporal   SpO2: 98% 94% 97% 95%   Weight:       Height:         Physical Exam  Vitals and nursing note reviewed.   Constitutional:       Appearance: Normal appearance.   HENT:      Head: Normocephalic and atraumatic.      Nose: Nose normal.   Eyes:      Conjunctiva/sclera: Conjunctivae normal.   Pulmonary:      Effort: Pulmonary effort is normal.    Abdominal:      General: There is no distension.      Palpations: Abdomen is soft.      Comments: SARAH in place with scant serous output in bulb   Neurological:      Mental Status: He is alert.   Psychiatric:         Mood and Affect: Mood normal.         Behavior: Behavior normal.          Hematology Chemistry   Lab Results   Component Value Date/Time    WBC 10.8 09/05/2023 02:33 AM    HEMOGLOBIN 10.1 (L) 09/05/2023 02:33 AM    HEMATOCRIT 33.0 (L) 09/05/2023 02:33 AM    PLATELETCT 368 09/05/2023 02:33 AM     Lab Results   Component Value Date/Time    SODIUM 135 09/05/2023 02:33 AM    POTASSIUM 4.1 09/05/2023 02:33 AM    CHLORIDE 102 09/05/2023 02:33 AM    CO2 24 09/05/2023 02:33 AM    GLUCOSE 132 (H) 09/05/2023 02:33 AM    BUN 9 09/05/2023 02:33 AM    CREATININE 1.18 09/05/2023 02:33 AM         Labs not explicitly included in this progress note were reviewed by the author.   Radiology/imaging not explicitly included in this progress note was reviewed by the author.     Radiology images reviewed, Labs reviewed and Medications reviewed

## 2023-09-05 NOTE — HOSPITAL COURSE
Nghia Coffman Jr is a 63 y.o. male who presented 9/3/2023 with abdominal pain.  Patient has a history of renal cell carcinoma status post nephrectomy of the left kidney and open pancreatic biopsy with Dr. Love/  on 8/2/2023. He presented to [facility] after several days of anorexia, generalized pain localizing to the right mid back, as well as vomiting.  CT scan at the outside hospital showed complex fluid collection in the left renal surgical bed and new spiculated mass in the inferior margin of the right upper lobe abutting anterior pleural surface.    IR placed drain for suspected pancreatic fluid leak from surgical site (fluid amylase >1000).  negative. Drain with low output, repeat CT unchanged. Started on TPN.   Seen by surgical oncology, abdominal fluid collection likely hematoma. SARAH drain removed, discontinued antibiotics as this is unlikely to be infectious.  Started megace for appetite stimulation, initiated. Pt tolerating PO intake, pain controlled. TPN discontinued.

## 2023-09-05 NOTE — DISCHARGE PLANNING
Case Management Discharge Planning    Admission Date: 9/3/2023  GMLOS: 4.3  ALOS: 2    6-Clicks ADL Score: 24  6-Clicks Mobility Score: 21      Anticipated Discharge Dispo: Discharge Disposition: Discharged to home/self care (01)  Discharge Address: [address] [place] [id number]  Discharge Contact Phone Number: [phone]    DME Needed: No    Action(s) Taken: DC Assessment Complete (See below)    Escalations Completed: None    Medically Clear: No    Next Steps: f/u with pt and medical team to discuss dc needs and barriers.      Barriers to Discharge: Medical clearance    Is the patient up for discharge tomorrow: No      Care Transition Team Assessment      RN CM met with pt at bedside to complete assessment. Pt A&Ox4 and able to verify the information on the face sheet.  Per Pt he lives with his spouse Keyana in a one story house with 4 steps to enter in [facility]. On Baseline, Pt was independent  in all his ADL's and IADL's. Pt has not had previous HH nor does he use DME or home O2. PCP is Dr. Greg Grande.    Information Source  Orientation Level: Oriented X4  Information Given By: Patient    Readmission Evaluation  Is this a readmission?: Yes - unplanned readmission  Did you understand your discharge instructions?: Yes  Did you have enough support after your last discharge?: Yes    Elopement Risk  Legal Hold: No  Ambulatory or Self Mobile in Wheelchair: Yes  Disoriented: No  Psychiatric Symptoms: None  History of Wandering: No  Elopement this Admit: No  Vocalizing Wanting to Leave: No  Displays Behaviors, Body Language Wanting to Leave: No-Not at Risk for Elopement  Elopement Risk: Not at Risk for Elopement    Interdisciplinary Discharge Planning  Lives with - Patient's Self Care Capacity: Spouse (Wife Yusra)  Patient or legal guardian wants to designate a caregiver: Yes  Caregiver name: Yusra Coffman  Caregiver contact info: [phone]  Support Systems: Spouse / Significant Other, Family Member(s)  (Daughter)  Housing / Facility: [address] [facility]  Durable Medical Equipment: Not Applicable    Discharge Preparedness  What is your plan after discharge?: Home with help  What are your discharge supports?: Spouse  Prior Functional Level: Ambulatory, Independent with Activities of Daily Living, Independent with Medication Management  Difficulity with ADLs: None  Difficulity with IADLs: None, Shopping    Functional Assesment  Prior Functional Level: Ambulatory, Independent with Activities of Daily Living, Independent with Medication Management    Finances  Financial Barriers to Discharge: No  Prescription Coverage: Yes    Vision / Hearing Impairment  Vision Impairment : Yes  Right Eye Vision: Impaired, Wears Glasses  Left Eye Vision: Impaired, Wears Glasses  Hearing Impairment : No         Advance Directive  Advance Directive?: None    Domestic Abuse  Have you ever been the victim of abuse or violence?: No  Physical Abuse or Sexual Abuse: No  Verbal Abuse or Emotional Abuse: No  Possible Abuse/Neglect Reported to:: Not Applicable    Psychological Assessment  History of Substance Abuse: None  History of Psychiatric Problems: No    Discharge Risks or Barriers  Discharge risks or barriers?: Complex medical needs    Anticipated Discharge Information  Discharge Disposition: Discharged to home/self care (01)  Discharge Address: [address] [place] [address] [place] [address]  Discharge Contact Phone Number: [phone]

## 2023-09-05 NOTE — PROGRESS NOTES
Rec'd report from day shift RN. Assumed pt care. Assessment completed. AA&OX4. Denies pain at this time. No s/s of discomfort or distress. Pt is up self, ambulates tot he bathroom and maintains steady gait. SARAH drain to left flank area, minimal serous fluid seen. Bed in lowest position, bed locked, treaded socks in place, RN and CNA numbers provided, call light within reach.

## 2023-09-05 NOTE — PROGRESS NOTES
[facility] Internal Medicine Daily Progress Note    Date of Service  9/5/2023    [facility] Team: [facility] Team   Attending: Rowena Rogers M.d.  Senior Resident: Dr. Amelia Banks MD  Intern:  Dr. Ana Gutierrez MD  Contact Number: [phone]    Chief Complaint  Nghia Coffman Jr is a 63 y.o. male admitted 9/3/2023 with     Hospital Course  Nghia Coffman Jr is a 63 y.o. male who presented 9/3/2023 with abdominal pain.  Patient has a history of renal cell carcinoma status post nephrectomy of the left kidney and partial pancreatectomy with Dr. Love.  He presented to [facility] after several days of anorexia, generalized pain localizing to the right mid back, as well as vomiting.  He has not had fever     Labs from [facility]  BUN 21, creatinine 1.6  Sodium 131, potassium 4.5, bicarb 28  Normal transaminases  WBC 8.4  Hemoglobin 12.2  Platelets 426     CT scan at the outside hospital showed complex 12 cm fluid collection in the left renal surgical bed, abscess could not be excluded.  CT of the chest shows 15 x 15 x 17 mm spiculated mass in the inferior margin of the right upper lobe abutting anterior pleural surface.  No edema or effusion.  No definite adenopathy.  Appearance of spiculated mass suggested primary malignancy rather than metastatic disease.     Patient was given 3.75 g of Zosyn and transferred here for need for urology and interventional radiology           Interval Problem Update  -No significant overnight events reported. Denies vomiting episodes since admission, but does have persistent nausea and abdominal pain.   -Drain output was 40ml in last 24 hours  -Fluid amylase was notably elevated at >1000.  WNL 12. Fluid cultures with NGTD. This is likely due to leakage of fluid from the pancreatic biopsy site, reached out to Dr. JOHNSON, hepatobiliary consult pending   -Urology and oncology following the patient.   -Will order IR imaging guided biopsy of the lung mass after  clearance from Dr. Chu.   -He denies having any vomiting episodes since yesterday, will restart PO diet and advance as tolerated -if patient does not require strict bowel rest, will start Marinol after hepatobiliary consult    I have discussed this patient's plan of care and discharge plan at IDT rounds today with Case Management, Nursing, Nursing leadership, and other members of the IDT team.    Consultants/Specialty  oncology and urology    Code Status  Full Code    Disposition  The patient is not medically cleared for discharge to home or a post-acute facility.        Review of Systems  Review of Systems   Constitutional:  Positive for malaise/fatigue and weight loss. Negative for chills, diaphoresis and fever.        Loss of appetite +   HENT: Negative.  Negative for congestion and sore throat.    Eyes: Negative.  Negative for blurred vision and double vision.   Respiratory: Negative.  Negative for cough, hemoptysis, sputum production, shortness of breath and wheezing.    Cardiovascular: Negative.  Negative for chest pain, palpitations, orthopnea, claudication and leg swelling.   Gastrointestinal:  Positive for abdominal pain, constipation (Has BMs every other day or so) and nausea. Negative for blood in stool, diarrhea, heartburn, melena and vomiting (Not in the past 24 hours per pt).        Denies vomiting episodes since admission, but does have persistent nausea and abdominal pain that are unchanged since yesterday.    Genitourinary:  Negative for dysuria and urgency.   Musculoskeletal:  Negative for back pain and falls.   Skin: Negative.    Neurological:  Negative for dizziness and headaches.   All other systems reviewed and are negative.       Physical Exam  Temp:  [36.4 °C (97.6 °F)-36.7 °C (98 °F)] 36.6 °C (97.9 °F)  Pulse:  [91-98] 98  Resp:  [17-20] 20  BP: (119-138)/(75-81) 126/75  SpO2:  [94 %-98 %] 95 %    Physical Exam  Vitals and nursing note reviewed.   Constitutional:       General: He is not  in acute distress.     Appearance: Normal appearance. He is not ill-appearing, toxic-appearing or diaphoretic.   HENT:      Head: Normocephalic and atraumatic.   Eyes:      General: No scleral icterus.  Cardiovascular:      Rate and Rhythm: Normal rate and regular rhythm.      Pulses: Normal pulses.      Heart sounds: Normal heart sounds. No murmur heard.     No gallop.   Pulmonary:      Effort: Pulmonary effort is normal. No respiratory distress.      Breath sounds: Normal breath sounds. No wheezing or rales.   Chest:      Chest wall: No tenderness.   Abdominal:      General: Abdomen is flat. There is no distension.      Palpations: Abdomen is soft. There is no mass.      Tenderness: There is abdominal tenderness (diffuse). There is left CVA tenderness. There is no guarding.      Comments: Large surgical scar along anterior costal margin well-healed;   No belkis's sign   Musculoskeletal:         General: No swelling or deformity. Normal range of motion.      Cervical back: Normal range of motion and neck supple.      Comments: Drain tube in place over left lower back    Skin:     General: Skin is warm and dry.      Capillary Refill: Capillary refill takes less than 2 seconds.   Neurological:      General: No focal deficit present.      Mental Status: He is alert and oriented to person, place, and time. Mental status is at baseline.         Fluids    Intake/Output Summary (Last 24 hours) at 9/5/2023 1636  Last data filed at 9/5/2023 0600  Gross per 24 hour   Intake 4933.05 ml   Output 740 ml   Net 4193.05 ml       Laboratory  Recent Labs     09/03/23  0635 09/04/23  0619 09/05/23  0233   WBC 7.7 8.4 10.8   RBC 3.70* 3.90* 3.71*   HEMOGLOBIN 10.2* 10.8* 10.1*   HEMATOCRIT 32.5* 33.7* 33.0*   MCV 87.8 86.4 88.9   MCH 27.6 27.7 27.2   MCHC 31.4* 32.0* 30.6*   RDW 47.9 47.1 47.0   PLATELETCT 344 354 368   MPV 9.6 9.4 9.5     Recent Labs     09/03/23  0635 09/04/23  0619 09/05/23  0233   SODIUM 136 137 135   POTASSIUM  4.3 4.0 4.1   CHLORIDE 98 102 102   CO2 27 24 24   GLUCOSE 98 140* 132*   BUN 16 9 9   CREATININE 1.40 1.15 1.18   CALCIUM 9.0 8.5 8.8                   Imaging  CT-DRAIN-RENAL-PERIRENAL   Final Result      1.  CT GUIDED PLACEMENT OF A PERCUTANEOUS DRAINAGE CATHETER IN A LEFT NEPHRECTOMY BED FLUID COLLECTION.      2.  THE CURRENT PLAN IS TO MONITOR DRAINAGE OUTPUT AND OBTAIN A FOLLOWUP CT SCAN IN 5-7 DAYS IF CLINICALLY INDICATED.      DX-CHEST-2 VIEWS   Final Result      No acute process.      OUTSIDE IMAGES-CT ABDOMEN /PELVIS   Final Result      OUTSIDE IMAGES-CT CHEST   Final Result      OUTSIDE IMAGES-US ABDOMEN   Final Result      OUTSIDE IMAGES-CT ABDOMEN /PELVIS   Final Result              Assessment/Plan  Problem Representation: 64yo male pt with PMHx of RCC S/P Left nephrectomy and partial pancreatectomy on 8/2/2023, was transferred from OSH due to CT evidence of fluid collection in the left nephrectomy surgical bed w/ some concerns for abscess. He is now S/P IR drain placement. Being followed by oncology and urology.     * Abdominal fluid collection  Assessment & Plan  Pt has hx of RCC left kidney, S/P left nephrectomy and partial pancreatectomy with Dr. Love on 8/2. Has had decreased appetite, malaise, and abdominal pain since then.  Recently has been having episodes of chills as well as flushing and diaphoresis.  No recorded fevers.  Outside CT shows fluid collection and renal surgical bed abscess not excluded.  -IR performed imaging guided drainage of the fluid and placement of drain on 9/3.   -Urology consulted, recommend continuing monitoring of drain output, follow cultures, and consideration of repeat imaging in the next few days for looking interval change  -Continue antibiotics, Zosyn started on 9/3-  -Pain control, antiemetics ordered   -Advance diet as tolerated    -Oncology consulted, pt was supposed to see Dr. Barclay next Thursday for initiation of systemic therapy.  Per patient's wife,  will delay at least 2 weeks given hospitalization 9 baseline   -pending hepatobiliary consult with Dr. ALEX Lowe solitary kidney- (present on admission)  Assessment & Plan  -Pt is s/p complete left nephrectomy on 8/2/2023, performed by Dr. Love. Indication was RCC of the left kidney.   -SCr this admission is 1.4, normal but slightly higher than baseline SCr of 1.1-1.3.   -Avoid nephrotoxins, NSAIDS    Lung mass  Assessment & Plan  -CT chest done at OSH prior to transfer revealed 15 x 15 x 17 mm spiculated mass in inferior margin of the right upper lobe abutting anterior pleural surface of the lung. Could be metastasis from RCC vs synchronous lung primary.   -Patient was not aware of this finding, was discussed at admission and after that.   -Oncology Dr. Barclay is following the patient, recommend imaging guided biopsy of the lung mass with IR, preferably while inpatient to expedite treatment.   -Will order IR imaging guided biopsy of the lung mass after clearance from Dr. Chu.    Acute pancreatitis, unspecified complication status, unspecified pancreatitis type- (present on admission)  Assessment & Plan  -Elevated lipase at outside hospital however no inflammation of pancreas noted on CT abdomen. Patient did have recent open pancreatic biopsy during left radical nephrectomy -- ?persistent pancreatitis since then.   -Lipase level was noted to be elevated this admission as well.   -Will continue gentle IVF for now and advance diet as tolerated.       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 Pt is S/P left nephrectomy and partial pancreatectomy with Dr. Love on 8/2.    Malignant neoplasm of left kidney (HCC)- (present on admission)  Assessment & Plan  -Pt has hx of RCC left kidney, S/P left nephrectomy and partial  pancreatectomy with Dr. Love on 8/2.  -Needs outpatient f/u with Dr. Barclay from [facility] that he has seen in the past. Was supposed to have an appt on 9/7 for initiation of systemic therapy.   -Seen by Dr. Barclay while inpatient as well.          VTE prophylaxis: SCDs/TEDs and heparin ppx    I have performed a physical exam and reviewed and updated ROS and Plan today (9/5/2023). In review of yesterday's note (9/4/2023), there are no changes except as documented above.

## 2023-09-05 NOTE — CARE PLAN
The patient is Stable - Low risk of patient condition declining or worsening    Shift Goals  Clinical Goals:  (nausea management)  Patient Goals:  (nausea management)  Family Goals: appetite stimulant, treatment to start    Progress made toward(s) clinical / shift goals:    Problem: Knowledge Deficit - Standard  Goal: Patient and family/care givers will demonstrate understanding of plan of care, disease process/condition, diagnostic tests and medications  Outcome: Progressing  Note: Patient and family understand POC     Problem: Pain - Standard  Goal: Alleviation of pain or a reduction in pain to the patient’s comfort goal  Outcome: Progressing  Flowsheets (Taken 9/5/2023 7398)  Non Verbal Scale: Calm  OB Pain Intervention: Declines  Pain Rating Scale (NPRS): 4  Note: Patient stated he did not need anything for pain at this time       Patient is not progressing towards the following goals:

## 2023-09-06 NOTE — CARE PLAN
The patient is Stable - Low risk of patient condition declining or worsening    Shift Goals  Clinical Goals: New IV, PICC line, TPN, Pain/nausea control, send SARAH sample, drain care  Patient Goals: comfort, rest  Family Goals: NA    Progress made toward(s) clinical / shift goals:  New IV started, PICC line inserted, TPN to be started soon, pain/nausea controlled without meds, drain care performed, SARAH lipase sample sent    Patient is not progressing towards the following goals:

## 2023-09-06 NOTE — PROGRESS NOTES
Note to reader: this note follows the APSO format rather than the historical SOAP format. Assessment and plan located at the top of the note for ease of use.    Chief Complaint  63 y.o. year old male here with N/V.    Assessment/Plan  Interval History   63 y.o.M s/p open radical left nephrectomy and pancreatic biopsy 08/2023 with path demonstrating pT4N1 chromophobe RCC and large fluid collection in left nephrectomy bed c/w post op seroma. Now s/p IR placement of drain into nephrectomy bed 9/3.     Plan:  - Repeat CT shows no significant improvement in size of fluid collection. Discussed with Dr Johnson, recommend f/u on gen surg recs and ordered IR consult to place second drain  - Continue NPO/TPN per hospitalist  - Urology will continue to follow      Patient seen and examined    9/6: CT shows no significant improvement in size of fluid collection despite drain. 40cc output in last 24h. WBC and Cr remain WNL, pt no longer experiencing nausea (currently NPO and has PICC line, starting TPN today). AFVSS, denies pain.    9/5. Sitting up in bed, denies pain but reports worsening nausea and RANDY. Reports hospitalist has discussed TPN.     9/4. Somewhat improving appetite however continued N/V. SARAH output serosanguinous 70cc/24hrs. Fluid amylase elevated at 1052      Discussed with patient, family, hospitalist, and with Dr Johnson, who has directed this patient's plan of care.    Review of Systems  Physical Exam   Review of Systems   Constitutional:  Negative for chills and fever.   Gastrointestinal:  Negative for abdominal pain, nausea and vomiting.   Genitourinary:  Negative for frequency, hematuria and urgency.     Vitals:    09/06/23 0410 09/06/23 0756 09/06/23 1017 09/06/23 1600   BP: 111/67 112/74  132/78   Pulse: 94 84  83   Resp: 18 18  18   Temp: 36.3 °C (97.4 °F) 36.6 °C (97.9 °F)  36.6 °C (97.9 °F)   TempSrc: Temporal Temporal  Temporal   SpO2: 94% 94%  98%   Weight:   77.6 kg (171 lb 1.2 oz)    Height:          Physical Exam  Vitals and nursing note reviewed.   Constitutional:       Appearance: Normal appearance.   HENT:      Head: Normocephalic and atraumatic.      Nose: Nose normal.   Eyes:      Conjunctiva/sclera: Conjunctivae normal.   Pulmonary:      Effort: Pulmonary effort is normal.   Abdominal:      General: There is no distension.      Palpations: Abdomen is soft.      Comments: SARAH in place with scant serous output in bulb   Neurological:      Mental Status: He is alert.   Psychiatric:         Mood and Affect: Mood normal.         Behavior: Behavior normal.          Hematology Chemistry   Lab Results   Component Value Date/Time    WBC 10.0 09/06/2023 04:57 AM    HEMOGLOBIN 10.2 (L) 09/06/2023 04:57 AM    HEMATOCRIT 32.5 (L) 09/06/2023 04:57 AM    PLATELETCT 374 09/06/2023 04:57 AM     Lab Results   Component Value Date/Time    SODIUM 134 (L) 09/06/2023 02:45 PM    POTASSIUM 4.0 09/06/2023 02:45 PM    CHLORIDE 102 09/06/2023 02:45 PM    CO2 24 09/06/2023 02:45 PM    GLUCOSE 105 (H) 09/06/2023 02:45 PM    BUN 7 (L) 09/06/2023 02:45 PM    CREATININE 1.00 09/06/2023 02:45 PM         Labs not explicitly included in this progress note were reviewed by the author.   Radiology/imaging not explicitly included in this progress note was reviewed by the author.     Radiology images reviewed, Labs reviewed and Medications reviewed

## 2023-09-06 NOTE — PROCEDURES
Vascular Access Team     Date of Insertion: 9/6/23  Arm Circumference: 31  Internal length: 45  External Length: hub  Vein Occupancy %: 45   Reason for PICC: TPN  Labs: WBC 10, , BUN 9, Cr 1/05, GFR 80, INR na     Consents confirmed, vessel patency confirmed with ultrasound. Risks and benefits of procedure explained to patient and education regarding central line associated bloodstream infections provided. Questions answered.      PICC placed in LUE per licensed provider order with ultrasound guidance.  5 Fr, double lumen PICC placed in basilic vein after 1 attempt(s). 2 mL of 1% lidocaine injected intradermally at the insertion site. A 21 gauge microintroducer needle was visualized entering the vein and modified Seldinger technique was used to obtain access to the vein. 45 cm catheter inserted and brisk blood return was observed from each lumen upon aspiration. Line secured at the hub cm marker. TCS stylet removed and observed to be fully intact. Each lumen flushed using pulsatile method without resistance with 10 mL 0.9% normal saline. PICC line secured with Biopatch and Tegaderm.     PICC tip placement location is confirmed by nurse to be in the Superior Vena Cava (SVC) utilizing 3CG technology. PICC line is appropriate for use at this time. Patient tolerated procedure well, without complications.  Patient condition relayed to primary RN or ordering physician via this post procedure note in the EMR.      Ultrasound images uploaded to PACS and viewable in the EMR - yes  Ultrasound imaged printed and placed in paper chart - no     BARD Power PICC ref # [id number], Lot # [id number], Expiration Date 7/31/24

## 2023-09-06 NOTE — PROGRESS NOTES
[facility] Internal Medicine Daily Progress Note    Date of Service  9/6/2023    [facility] Team: [facility]   Attending: Rowena Rogers M.d.  Senior Resident: Dr. Amelia Banks MD  Intern:  Dr. Ana Gutierrez MD  Contact Number: [phone]    Chief Complaint  Nghia Coffman Jr is a 63 y.o. male admitted 9/3/2023 with     Hospital Course  Nghia Coffman Jr is a 63 y.o. male who presented 9/3/2023 with abdominal pain.  Patient has a history of renal cell carcinoma status post nephrectomy of the left kidney and partial pancreatectomy with Dr. Love.  He presented to [facility] after several days of anorexia, generalized pain localizing to the right mid back, as well as vomiting.  He has not had fever     Labs from [facility]  BUN 21, creatinine 1.6  Sodium 131, potassium 4.5, bicarb 28  Normal transaminases  WBC 8.4  Hemoglobin 12.2  Platelets 426     CT scan at the outside hospital showed complex 12 cm fluid collection in the left renal surgical bed, abscess could not be excluded.  CT of the chest shows 15 x 15 x 17 mm spiculated mass in the inferior margin of the right upper lobe abutting anterior pleural surface.  No edema or effusion.  No definite adenopathy.  Appearance of spiculated mass suggested primary malignancy rather than metastatic disease.     Patient was given 3.75 g of Zosyn and transferred here for need for urology and interventional radiology  IR drained the fluid in the left surgical bed fluid collection and placed a drain.       Interval Problem Update  -nausea much improved with addition of compazine    -Drain output was 20ml in last 24 hours, pending CT A/P to assess for interval change   -After discussion with urology, plan for strict NPO to reduce output from pancreatic biopsy site, PICC line placed in LUE, start TPN 9/6/23   -Urology and oncology following the patient.   -placed transfer orders to Oncology floor  -Cultures of fluid sent- NGTD, high amylase      I  have discussed this patient's plan of care and discharge plan at IDT rounds today with Case Management, Nursing, Nursing leadership, and other members of the IDT team.    Consultants/Specialty  oncology and urology    Code Status  Full Code    Disposition  The patient is not medically cleared for discharge to home or a post-acute facility.        Review of Systems  Review of Systems   Constitutional:  Positive for malaise/fatigue and weight loss. Negative for chills and fever.        Loss of appetite +   HENT:  Negative for sore throat.    Eyes: Negative.  Negative for blurred vision and double vision.   Respiratory: Negative.  Negative for cough and shortness of breath.    Cardiovascular: Negative.    Gastrointestinal:  Positive for abdominal pain, constipation (Has BMs every other day or so) and nausea (improved). Negative for vomiting (Not since admission - just nausea).        Denies vomiting episodes since admission, but does have persistent nausea and abdominal pain that are unchanged since yesterday.    Musculoskeletal:  Negative for back pain and falls.   Skin: Negative.    All other systems reviewed and are negative.       Physical Exam  Temp:  [36.3 °C (97.4 °F)-36.8 °C (98.2 °F)] 36.6 °C (97.9 °F)  Pulse:  [84-94] 84  Resp:  [18-20] 18  BP: (111-117)/(66-74) 112/74  SpO2:  [94 %-96 %] 94 %    Physical Exam  Vitals and nursing note reviewed.   Constitutional:       General: He is not in acute distress.     Appearance: Normal appearance. He is ill-appearing. He is not toxic-appearing or diaphoretic.   HENT:      Head: Normocephalic and atraumatic.   Eyes:      General: No scleral icterus.  Cardiovascular:      Rate and Rhythm: Normal rate and regular rhythm.      Pulses: Normal pulses.      Heart sounds: Normal heart sounds. No murmur heard.     No gallop.   Pulmonary:      Effort: Pulmonary effort is normal. No respiratory distress.      Breath sounds: Normal breath sounds. No wheezing or rales.   Chest:       Chest wall: No tenderness.   Abdominal:      General: Abdomen is flat.      Palpations: Abdomen is soft.      Tenderness: Tenderness: diffuse.      Comments: Large surgical scar along anterior costal margin well-healed;   No belkis's sign   Musculoskeletal:      Cervical back: Normal range of motion and neck supple.      Comments: Drain tube in place over left lower back   PICC line in LUE   Skin:     General: Skin is warm and dry.      Capillary Refill: Capillary refill takes less than 2 seconds.   Neurological:      General: No focal deficit present.      Mental Status: He is alert and oriented to person, place, and time. Mental status is at baseline.         Fluids    Intake/Output Summary (Last 24 hours) at 9/6/2023 1354  Last data filed at 9/6/2023 0555  Gross per 24 hour   Intake 480 ml   Output 628 ml   Net -148 ml       Laboratory  Recent Labs     09/04/23 0619 09/05/23 0233 09/06/23 0457   WBC 8.4 10.8 10.0   RBC 3.90* 3.71* 3.70*   HEMOGLOBIN 10.8* 10.1* 10.2*   HEMATOCRIT 33.7* 33.0* 32.5*   MCV 86.4 88.9 87.8   MCH 27.7 27.2 27.6   MCHC 32.0* 30.6* 31.4*   RDW 47.1 47.0 47.7   PLATELETCT 354 368 374   MPV 9.4 9.5 10.0     Recent Labs     09/04/23 0619 09/05/23 0233 09/06/23 0457   SODIUM 137 135 139   POTASSIUM 4.0 4.1 3.9   CHLORIDE 102 102 105   CO2 24 24 24   GLUCOSE 140* 132* 120*   BUN 9 9 9   CREATININE 1.15 1.18 1.05   CALCIUM 8.5 8.8 8.4*                   Imaging  CT-ABDOMEN-PELVIS W/O   Final Result      1.  No significant interval change in the size of the large LEFT retroperitoneal retroperitoneal fluid and gas collection following placement of a drainage tube. This compresses and displaces the stomach and other loops of bowel Residual tumor is not    excluded by this exam.   2.  Grossly stable size of smaller fluid and gas collection along the course of the drainage tube      IR-PICC LINE PLACEMENT W/ GUIDANCE > AGE 5   Final Result                  Ultrasound-guided PICC  placement performed by qualified nursing staff as    above.          CT-DRAIN-RENAL-PERIRENAL   Final Result      1.  CT GUIDED PLACEMENT OF A PERCUTANEOUS DRAINAGE CATHETER IN A LEFT NEPHRECTOMY BED FLUID COLLECTION.      2.  THE CURRENT PLAN IS TO MONITOR DRAINAGE OUTPUT AND OBTAIN A FOLLOWUP CT SCAN IN 5-7 DAYS IF CLINICALLY INDICATED.      DX-CHEST-2 VIEWS   Final Result      No acute process.      OUTSIDE IMAGES-CT ABDOMEN /PELVIS   Final Result      OUTSIDE IMAGES-CT CHEST   Final Result      OUTSIDE IMAGES-US ABDOMEN   Final Result      OUTSIDE IMAGES-CT ABDOMEN /PELVIS   Final Result              Assessment/Plan  Problem Representation: 64yo male pt with PMHx of RCC S/P Left nephrectomy and partial pancreatectomy on 8/2/2023, was transferred from OSH due to CT evidence of fluid collection in the left nephrectomy surgical bed w/ some concerns for abscess. He is now S/P IR drain placement. Being followed by oncology and urology.     * Abdominal fluid collection  Assessment & Plan  Pt has hx of RCC left kidney, S/P left nephrectomy and partial pancreatectomy with Dr. oLve on 8/2. Has had decreased appetite, malaise, and abdominal pain since then.  Recently has been having episodes of chills as well as flushing and diaphoresis.  No recorded fevers.  Outside CT shows fluid collection and renal surgical bed abscess not excluded.  -IR performed imaging guided drainage of the fluid and placement of drain on 9/3.   -Urology consulted, recommend continuing monitoring of drain output, follow cultures, repeat CT A/P pending to assess for interval changes   -Continue antibiotics, Zosyn started on 9/3-  -Pain control, antiemetics ordered   -NPO, TPN     -Oncology consulted, pt was supposed to see Dr. Barclay next Thursday for initiation of systemic therapy.  Per patient's wife, will delay at least 2 weeks given hospitalization      Acquired solitary kidney- (present on admission)  Assessment & Plan  -Pt is s/p  complete left nephrectomy on 8/2/2023, performed by Dr. Love. Indication was RCC of the left kidney.   -SCr this admission is 1.4, normal but slightly higher than baseline SCr of 1.1-1.3.   -Avoid nephrotoxins, NSAIDS    Lung mass  Assessment & Plan  -CT chest done at OSH prior to transfer revealed 15 x 15 x 17 mm spiculated mass in inferior margin of the right upper lobe abutting anterior pleural surface of the lung. Could be metastasis from RCC vs synchronous lung primary.   -Patient was not aware of this finding, was discussed at admission and after that.   -Oncology Dr. Barclay is following the patient, recommend imaging guided biopsy of the lung mass with IR, preferably while inpatient to expedite treatment.   -Will order IR imaging guided biopsy of the lung mass after clearance from Dr. Chu.    Acute pancreatitis, unspecified complication status, unspecified pancreatitis type- (present on admission)  Assessment & Plan  -Elevated lipase at outside hospital however no inflammation of pancreas noted on CT abdomen. Patient did have recent open pancreatic biopsy during left radical nephrectomy -- suspect elevated amylase and lipase are 2/2 to pancreatic leak from biopsy site.       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 Pt is S/P left nephrectomy and partial pancreatectomy with Dr. Love on 8/2.    Malignant neoplasm of left kidney (HCC)- (present on admission)  Assessment & Plan  -Pt has hx of RCC left kidney, S/P left nephrectomy and partial pancreatectomy with Dr. Love on 8/2.  -Needs outpatient f/u with Dr. Barclay from [facility] that he has seen in the past. Was supposed to have an appt on 9/7 for initiation of systemic therapy.   -Seen by Dr. Barclay while inpatient as well.          VTE prophylaxis:  SCDs/TEDs and heparin ppx    I have performed a physical exam and reviewed and updated ROS and Plan today (9/6/2023). In review of yesterday's note (9/5/2023), there are no changes except as documented above.

## 2023-09-06 NOTE — PROGRESS NOTES
Bedside report recieved from night shift ALEJANDRA Watson. patient resting comfortably in bed, call bell in reach. no acute distress noted. patient is a/o x 4, patient offers no complaints at this time. New IV placed to continued IV fluids and ABX. Patient up self/independent.

## 2023-09-06 NOTE — DIETARY
"Nutrition Support Assessment:  Day 3 of admit.  Nghia Coffman Jr is a 63 y.o. male with admitting DX of abdominal fluid collection.     Current problem list:  Acute pancreatitis  Malignant neoplasm of left kidney  Malignant tumor of body of pancreas  Acquired solitary kidney  Lung mass  Abdominal pain     Assessment:  Estimated Nutritional Needs based on:   Height: 182.9 cm (6' 0.01\")  Weight: 83 kg (183 lb)  Weight to Use in Calculations: 83 kg (182 lb 15.7 oz)  Ideal Body Weight: 80.7 kg (178 lb)  Percent Ideal Body Weight: 102.8  Body mass index is 24.81 kg/m²., BMI classification: normal weight    Calculation/Equation: MSJ x 1.3  Total Calories / day: 2164 - 2490  (Calories / k - 30)  Total Grams Protein / day: 99.8 - 124.5  (Grams Protein / k.2 - 1.5)     Evaluation:   TPN consult likely r/t acute pancreatitis and abdominal fluid.  PICC line placed .  Per previous RD note 9/3, pt reports poor PO. Per ADL's, pt ate 25-50% of most meals  Left nephrectomy and partial pancreatectomy 23  Labs: glu 120  MAR: D5 @ 125 mL/hr, anti-emetics, PPI, K-Phos completed   Skin: no notable wounds or edema  GI: LBM      Malnutrition Risk: Per 9/3 RD note: Pt meets severe chronic disease related malnutrition likely related to recent Renal Ca and currently with lung mass that is suspicious of malignancy as evidenced by decreased intake with severe wt loss over past ~ 6 weeks per pt report as well as moderate fat loss and severe muscle loss noted on nutrition focused physical exam.      Recommendations/Plan:  TPN per pharmacy  Monitor weight.    RD following.          "

## 2023-09-06 NOTE — CARE PLAN
The patient is Stable - Low risk of patient condition declining or worsening    Shift Goals  Clinical Goals: Pain control, monitoring SARAH draining  Patient Goals: Get better, no more pain  Family Goals: NA    Progress made toward(s) clinical / shift goals:      Problem: Knowledge Deficit - Standard  Goal: Patient and family/care givers will demonstrate understanding of plan of care, disease process/condition, diagnostic tests and medications  Description: Target End Date:  1-3 days or as soon as patient condition allows    Document in Patient Education    1.  Patient and family/caregiver oriented to unit, equipment, visitation policy and means for communicating concern  2.  Complete/review Learning Assessment  3.  Assess knowledge level of disease process/condition, treatment plan, diagnostic tests and medications  4.  Explain disease process/condition, treatment plan, diagnostic tests and medications  Outcome: Progressing     Problem: Pain - Standard  Goal: Alleviation of pain or a reduction in pain to the patient’s comfort goal  Description: Target End Date:  Prior to discharge or change in level of care    Document on Vitals flowsheet    1.  Document pain using the appropriate pain scale per order or unit policy  2.  Educate and implement non-pharmacologic comfort measures (i.e. relaxation, distraction, massage, cold/heat therapy, etc.)  3.  Pain management medications as ordered  4.  Reassess pain after pain med administration per policy  5.  If opiods administered assess patient's response to pain medication is appropriate per POSS sedation scale  6.  Follow pain management plan developed in collaboration with patient and interdisciplinary team (including palliative care or pain specialists if applicable)  Outcome: Progressing

## 2023-09-06 NOTE — PROGRESS NOTES
"Pharmacy TPN Note for 2023     63 y.o. male on TPN day # 1      Admission History: Admitted on 9/3/2023 for Abdominal pain [R10.9]  Acute pancreatitis, unspecified complication status, unspecified pancreatitis type [K85.90]    Allergies:   Seasonal     Indication for TPN:   Indication: Prolonged bowel rest;Other (Comment) (Pancreatic leak)     Clinical Considerations for TPN:   Clinical Considerations Impacting TPN: Not Applicable       Temp (24hrs), Av.6 °C (97.9 °F), Min:36.3 °C (97.4 °F), Max:36.8 °C (98.2 °F)    Recent Labs     23  0619 23  0233 23  0457   SODIUM 137 135 139   POTASSIUM 4.0 4.1 3.9   CHLORIDE 102 102 105   CO2 24 24 24   BUN 9 9 9   CREATININE 1.15 1.18 1.05   GLUCOSE 140* 132* 120*   CALCIUM 8.5 8.8 8.4*   ASTSGOT 9*  --   --    ALTSGPT 7  --   --    ALBUMIN 3.1* 3.2  --    TBILIRUBIN 0.2  --   --    PHOSPHORUS 2.7 2.4*  --    MAGNESIUM 1.7 1.8 1.9     Accu-Checks  No results for input(s): \"POCGLUCOSE\" in the last 72 hours.    Vitals:    23 0410 23 0756 23 1017   BP: 117/72 111/67 112/74    Weight:    77.6 kg (171 lb 1.2 oz)   Height:           Intake/Output Summary (Last 24 hours) at 2023 1551  Last data filed at 2023 0555  Gross per 24 hour   Intake 480 ml   Output 628 ml   Net -148 ml       Orders Placed This Encounter   Procedures    Diet NPO Restrict to: Strict     Standing Status:   Standing     Number of Occurrences:   1     Order Specific Question:   Diet NPO Restrict to:     Answer:   Strict [1]       TPN for past 72 hours (Show up to 3 orders; newest on the left.)       Start date and time    2023      TPN Adult Central Line [[id number]]    Order Status  Active    Frequency  TPN DAILY       Base    Clinisol  140 g    dextrose 70%  235 g    fat emulsion (soy) 20%  45 g       Additives    potassium phosphate  30 mmol    potassium chloride  40 mEq    sodium acetate  180 mEq    sodium chloride  180 mEq    " magnesium sulfate  8 mEq    calcium GLUConate  4.65 mEq    M.T.E.-4 Tralement  1 mL    M.V.I. Adult  10 mL       QS Base    sterile water  718 mL       Energy Contribution    Proteins  560 kcal    Dextrose  799 kcal    Lipids  450 kcal    Total  1,809 kcal       Electrolyte Ion Calculated Amount    Sodium  360 mEq    Potassium  84 mEq    Calcium  4.65 mEq    Magnesium  8 mEq    Aluminum  233.33 mEq    Phosphate  30 mmol    Chloride  220 mEq    Acetate  298.53 mEq    Chloride: Acetate Ratio  0.735       Other    Total Amino Acid  140 g    Total Amino Acid/kg  1.8 g/kg    Glucose Infusion Rate  2.1 mg/kg/min    Volume  2,400 mL    Rate  100 mL/hr    Dosing Weight  77.6 kg    Infusion Site  Central            TPN Requirements:  Weight Used in TPN Calculation: 78 kg (171 lb 15.3 oz)  Total Protein Needs (g/kg):  (1.5-2 g/kg)  Total Caloric Needs (kcal):  (0697-4788 kcal/day)  Total Fluid Needs (mL):  (30-35 mL/kg/day)     Current TPN Formulation:  % of goal: 100%  % kcal as lipids: 25  Grams protein/k.8  Non-protein calories: 1249  Kcals/k  Total daily calories: 1809    Comments:  1) Nutritional Plan: Patient with concern for pancreatic leak and initiated on TPN to allow for bowel rest. Patient was tolerating some enteral nutrition up until yesterday and has most recently been receiving maintenance IVF providing ~3 L/day. Discussed fluid requirements for TPN with provider and will reduce to 2.4 L/day and reassess fluid status in the coming days to guide adjustments.   2) Labs: BMP from today reviewed, additional data from CMP & lipid profile delayed and remain in process. Sodium in TPN will be formulated at 0.9% NS equivalence based on current IVF needs. IV KPhos ordered prior to initiation of TPN. Will supplement additional electrolytes in AM as needed.  3) Fluids/additives: Low risk for refeeding so supplemental vitamins not added to TPN. Patient is on IV pantoprazole external of TPN.   4) Changes to  formulation: Will initiate TPN at ~100% of goal kcal requirements. Will assess lipid profile when data available for possible adjustments to macronutrients. Will monitor BG Q8hr per TPN protocol to assess glucose tolerance.   5) Next labs/note: CMP, Mag, Phos due 9/7    Pharmacy will continue to follow.     Paige Guadalupe, PharmD, BCCCP

## 2023-09-07 NOTE — PROGRESS NOTES
Assumed care of pt at 1900. Report received from Day RN. Pt is A&O x 4 . Patient stated that their pain is 1/10 currently. Pt's pain comfort goal is 1/10.     Bed in lowest locked position, call light within reach, hourly rounding in place.      Notes reviewed, Labs reviewed, Orders reviewed, communication board updated. Pt declines any further needs at this time.      4Ps: Pain, Potty, Positioning, and Possessions discussed with patient.  Patient has no pain, urinal nearby and toilet ing independently. Pt position comfortable and call-light in reach     Questions concerning hospital course of treatment from current providers answered.

## 2023-09-07 NOTE — DOCUMENTATION QUERY
[facility]                                                                       Query Response Note      PATIENT:               MACIEJ JOLLEY JR  ACCT #:                  [id number]  MRN:                     [id number]  :                      1960  ADMIT DATE:       9/3/2023 2:25 AM  DISCH DATE:          RESPONDING  PROVIDER #:        [id number]           QUERY TEXT:    Based upon your review of the clinical indicators, please select and document the most appropriate diagnosis:    The patient's Clinical Indicators include:  9/3 Dietary Eval: pt meets severe chronic disease malnutrition.  Decreased intake and severe wt loss over past ~6 weeks per pt report, moderate fat loss and severe muscle loss noted on physical exam.  Persistent nausea   Progress Notes:  + for malaise/ fatigue and weight loss.  Loss of appetite   Risk Factors: decreased PO intake, wt loss, fat/ muscle loss  Treatment: dietary eval, TPN started, monitor wt    Thank you,  Moe Jimenez RN, BSN, CCDS  Clinical   Connect via [facility]  Options provided:   -- Severe protein calorie malnutrition   -- Moderate protein calorie malnutrition   -- Mild protein calorie malnutrition   -- Other explanation, please specify   -- Findings of no clinical significance   -- Unable to determine      Query created by: Moe Jimenez on 2023 8:38 AM    RESPONSE TEXT:    Severe protein calorie malnutrition          Electronically signed by:  JULI DURHAM MD 2023 3:16 PM

## 2023-09-07 NOTE — PROGRESS NOTES
Surgical Progress Note    Author: Farhan Portillo M.D. Date & Time created: 2023   10:01 AM     Interval Events:  The patient was seen today per the request of Dr. Love and Dr. Johnson secondary to the concern for possible pancreatic leak.  The patient undergone a large resection with Dr. Love and most likely this involved a portion of the pancreatic body.  In any event the patient has had difficulty in recovering.  He was seen by medical oncology and the plan was to start his chemotherapy today.  When he was seen in the emergency room he was complaining of right upper quadrant pain but they found a large fluid collection that appears to be very heterogeneous in the left upper quadrant.  This collection has the characteristics of possible an old hematoma mixed with some fluid.  The fluid was sent and it came back with an amylase around the thousand.  The drain itself that was placed is not putting out anything.  The patient is afebrile with a normal white count.  Most of his symptoms are failure to thrive, lack of appetite, and pain in the right upper quadrant.  They have asked me to see if there is anything we should be doing with regards to his pancreasIn the amylase of 1000 and the fluid.  I have reviewed the patient's pathology as well as his CT scan.  It is without IV contrast.  Review of Systems   Constitutional:  Positive for malaise/fatigue.   Gastrointestinal:  Positive for abdominal pain, nausea and vomiting.   All other systems reviewed and are negative.    Hemodynamics:  Temp (24hrs), Av.6 °C (97.9 °F), Min:36.4 °C (97.6 °F), Max:36.7 °C (98 °F)  Temperature: 36.4 °C (97.6 °F)  Pulse  Av  Min: 72  Max: 111   Blood Pressure: 139/81     Respiratory:    Respiration: 18, Pulse Oximetry: 95 %     Work Of Breathing / Effort: Within Normal Limits  RUL Breath Sounds: Clear, RML Breath Sounds: Clear, RLL Breath Sounds: Clear, JOSE Breath Sounds: Clear, LLL Breath Sounds:  Clear  Neuro:  GCS       Fluids:    Intake/Output Summary (Last 24 hours) at 9/7/2023 1001  Last data filed at 9/7/2023 0752  Gross per 24 hour   Intake --   Output 5118 ml   Net -5118 ml     Weight: 77.6 kg (171 lb 1.2 oz)  Current Diet Order   Procedures    Diet NPO Restrict to: Strict     Physical Exam  Vitals and nursing note reviewed.   Constitutional:       Appearance: He is ill-appearing.   HENT:      Head: Normocephalic.      Nose: Nose normal.      Mouth/Throat:      Mouth: Mucous membranes are moist.      Pharynx: Oropharynx is clear.   Cardiovascular:      Rate and Rhythm: Normal rate and regular rhythm.      Pulses: Normal pulses.      Heart sounds: Normal heart sounds.   Pulmonary:      Effort: Pulmonary effort is normal.      Breath sounds: Normal breath sounds.   Abdominal:      General: Abdomen is flat. Bowel sounds are normal.      Tenderness: There is abdominal tenderness.      Comments: Right-sided tenderness, no real pain at the site of the fluid collection but does have a drain in place putting out nothing   Neurological:      Mental Status: He is alert.   Psychiatric:         Mood and Affect: Mood normal.         Behavior: Behavior normal.         Thought Content: Thought content normal.         Judgment: Judgment normal.       Labs:  Recent Results (from the past 24 hour(s))   Comp Metabolic Panel    Collection Time: 09/06/23  2:45 PM   Result Value Ref Range    Sodium 134 (L) 135 - 145 mmol/L    Potassium 4.0 3.6 - 5.5 mmol/L    Chloride 102 96 - 112 mmol/L    Co2 24 20 - 33 mmol/L    Anion Gap 8.0 7.0 - 16.0    Glucose 105 (H) 65 - 99 mg/dL    Bun 7 (L) 8 - 22 mg/dL    Creatinine 1.00 0.50 - 1.40 mg/dL    Calcium 8.5 8.5 - 10.5 mg/dL    Correct Calcium 9.2 8.5 - 10.5 mg/dL    AST(SGOT) 11 (L) 12 - 45 U/L    ALT(SGPT) 8 2 - 50 U/L    Alkaline Phosphatase 94 30 - 99 U/L    Total Bilirubin 0.3 0.1 - 1.5 mg/dL    Albumin 3.1 (L) 3.2 - 4.9 g/dL    Total Protein 6.4 6.0 - 8.2 g/dL    Globulin 3.3  1.9 - 3.5 g/dL    A-G Ratio 0.9 g/dL   Phosphorus    Collection Time: 09/06/23  2:45 PM   Result Value Ref Range    Phosphorus 2.2 (L) 2.5 - 4.5 mg/dL   Cholesterol    Collection Time: 09/06/23  2:45 PM   Result Value Ref Range    Cholesterol,Tot 99 (L) 100 - 199 mg/dL   Triglyceride    Collection Time: 09/06/23  2:45 PM   Result Value Ref Range    Triglycerides 92 0 - 149 mg/dL   FLUID LIPASE    Collection Time: 09/06/23  2:45 PM   Result Value Ref Range    Lipase Fluid Source Drain    ESTIMATED GFR    Collection Time: 09/06/23  2:45 PM   Result Value Ref Range    GFR (CKD-EPI) 84 >60 mL/min/1.73 m 2   Comp Metabolic Panel    Collection Time: 09/07/23  2:22 AM   Result Value Ref Range    Sodium 137 135 - 145 mmol/L    Potassium 4.3 3.6 - 5.5 mmol/L    Chloride 104 96 - 112 mmol/L    Co2 23 20 - 33 mmol/L    Anion Gap 10.0 7.0 - 16.0    Glucose 130 (H) 65 - 99 mg/dL    Bun 9 8 - 22 mg/dL    Creatinine 0.96 0.50 - 1.40 mg/dL    Calcium 8.9 8.5 - 10.5 mg/dL    Correct Calcium 9.6 8.5 - 10.5 mg/dL    AST(SGOT) 9 (L) 12 - 45 U/L    ALT(SGPT) 7 2 - 50 U/L    Alkaline Phosphatase 95 30 - 99 U/L    Total Bilirubin 0.3 0.1 - 1.5 mg/dL    Albumin 3.1 (L) 3.2 - 4.9 g/dL    Total Protein 6.6 6.0 - 8.2 g/dL    Globulin 3.5 1.9 - 3.5 g/dL    A-G Ratio 0.9 g/dL   Magnesium    Collection Time: 09/07/23  2:22 AM   Result Value Ref Range    Magnesium 1.9 1.5 - 2.5 mg/dL   Phosphorus    Collection Time: 09/07/23  2:22 AM   Result Value Ref Range    Phosphorus 3.0 2.5 - 4.5 mg/dL   ESTIMATED GFR    Collection Time: 09/07/23  2:22 AM   Result Value Ref Range    GFR (CKD-EPI) 89 >60 mL/min/1.73 m 2     Medical Decision Making, by Problem:  Active Hospital Problems    Diagnosis     Abdominal pain [R10.9]     Acute pancreatitis, unspecified complication status, unspecified pancreatitis type [K85.90]     Abdominal fluid collection [R18.8]     Lung mass [R91.8]     Acquired solitary kidney [Z90.5]     Malignant neoplasm of left kidney (HCC)  [C64.2]     Malignant tumor of body of pancreas (HCC) [C25.1]      Plan:  It appears as though this large fluid collection in the left upper quadrant is actually very heterogeneous.  This would be unusual for a pancreatic leak.  The amylase is elevated secondary to the original procedure whereby a portion of the pancreas had been both manipulated and possibly incised with the primary tumor.  The biopsy site was only several millimeters in size in the area of the celiac trunk/pancreas thus that would not cause a large pancreatic leak.  My recommendation is to remove the IR drain since its not draining anything.  Lets allow this fluid collection to mature and declare itself.  Hopefully it will stay sterile and the patient will not have any trouble.  The tumor itself and fluid collection are near the stomach and this is most likely causing his ileus.  I also recommend a appetite stimulant.  I will order Megace.  I am also recommending stopping the antibiotics and once he is eating a general diet to stop the TPN.  The goal is to manage his pain and get him home.  There is no surgical intervention needed at this time.  If the patient develops fevers greater than 101 or elevated white counts that I would rescan him but with IV contrast if possible.  This way we could see if the patient has a IR target to place a drain.  Please call us if there is any other questions.    Quality Measures:  Quality-Core Measures    Discussed patient condition with Family and RN

## 2023-09-07 NOTE — PROGRESS NOTES
"Pharmacy TPN Note for 2023     63 y.o. male on TPN day # 2      Admission History: Admitted on 9/3/2023 for Abdominal pain [R10.9]  Acute pancreatitis, unspecified complication status, unspecified pancreatitis type [K85.90]    Allergies:   Seasonal     Indication for TPN:   Indication: Prolonged bowel rest;Other (Comment) (Pancreatic leak)     Clinical Considerations for TPN:   Clinical Considerations Impacting TPN: Not Applicable        Temp (24hrs), Av.6 °C (97.9 °F), Min:36.4 °C (97.6 °F), Max:36.7 °C (98 °F)    Recent Labs     23  0233 23  0457 23  1445 23  0222   SODIUM 135 139 134* 137   POTASSIUM 4.1 3.9 4.0 4.3   CHLORIDE 102 105 102 104   CO2 24 24 24 23   BUN 9 9 7* 9   CREATININE 1.18 1.05 1.00 0.96   GLUCOSE 132* 120* 105* 130*   CALCIUM 8.8 8.4* 8.5 8.9   ASTSGOT  --   --  11* 9*   ALTSGPT  --   --  8 7   ALBUMIN 3.2  --  3.1* 3.1*   TBILIRUBIN  --   --  0.3 0.3   PHOSPHORUS 2.4*  --  2.2* 3.0   MAGNESIUM 1.8 1.9  --  1.9     Accu-Checks  No results for input(s): \"POCGLUCOSE\" in the last 72 hours.    Vitals:    23 1600 23 0358 23 0752   BP: 132/78 138/83 (!) 147/88 139/81   Weight:       Height:           Intake/Output Summary (Last 24 hours) at 2023 1012  Last data filed at 2023 0752  Gross per 24 hour   Intake --   Output 5118 ml   Net -5118 ml       Orders Placed This Encounter   Procedures    Diet Order Diet: Clear Liquid     Standing Status:   Standing     Number of Occurrences:   1     Order Specific Question:   Diet:     Answer:   Clear Liquid [10]       TPN for past 72 hours (Show up to 3 orders; newest on the left. Changes between the two most recent orders are indicated.)       Start date and time    2023      TPN Adult Central Line [[id number]] TPN Adult Central Line [[id number]]    Order Status  Active Last Dose in Progress    Last Admin   New Bag at 2023 2018 by David Ryan R.N.    " Frequency  TPN DAILY TPN DAILY       Base    Clinisol  140 g 140 g    dextrose 70%  265 g 235 g    fat emulsion (soy) 20%  55 g 45 g       Additives    potassium phosphate  30 mmol 30 mmol    potassium chloride  40 mEq 40 mEq    sodium acetate  180 mEq 180 mEq    sodium chloride  180 mEq 180 mEq    magnesium sulfate  8 mEq 8 mEq    calcium GLUConate  4.65 mEq 4.65 mEq    M.T.E.-4 Tralement  1 mL 1 mL    M.V.I. Adult  10 mL 10 mL       QS Base    sterile water  625.1 mL 718 mL       Energy Contribution    Proteins  560 kcal 560 kcal    Dextrose  901 kcal 799 kcal    Lipids  550 kcal 450 kcal    Total  2,011 kcal 1,809 kcal       Electrolyte Ion Calculated Amount    Sodium  360 mEq 360 mEq    Potassium  84 mEq 84 mEq    Calcium  4.65 mEq 4.65 mEq    Magnesium  8 mEq 8 mEq    Aluminum  233.33 mEq 233.33 mEq    Phosphate  30 mmol 30 mmol    Chloride  220 mEq 220 mEq    Acetate  298.53 mEq 298.53 mEq    Chloride: Acetate Ratio  0.735 0.735       Other    Total Amino Acid  140 g 140 g    Total Amino Acid/kg  1.8 g/kg 1.8 g/kg    Glucose Infusion Rate  2.37 mg/kg/min 2.1 mg/kg/min    Volume  2,400 mL 2,400 mL    Rate  100 mL/hr 100 mL/hr    Dosing Weight  77.6 kg 77.6 kg    Infusion Site  [facility]            TPN Requirements:  Weight Used in TPN Calculation: 78 kg (171 lb 15.3 oz)  Total Protein Needs (g/kg):  (1.5-2 g/kg)  Total Caloric Needs (kcal):  (4183-1297 kcal/day)  Total Fluid Needs (mL):  (30-35 mL/kg/day)     Current TPN Formulation:  % of goal: 100%  % kcal as lipids: 27  Grams protein/k.8  Non-protein calories: 1451  Kcals/k  Total daily calories:     Comments:  1) Nutritional Plan: Patient appears to have tolerated initiation of TPN last evening based on stable labs. New surgical consultant recommending advancing enteral diet and stopping TPN once tolerating diet - new diet orders in place. RD recommendations reviewed and appreciated; macros adjusted based on RD reccs now that full CMP  results and lipid profile available.   2) Labs: Overall CMP stable - no electrolyte supplementation or adjustment indicated. BG should be checked q8hr while on TPN (no results available).   3) Fluids/additives: Continuing TPN providing ~2.4 L/day. Will review fluid/diet plan with provider as we may need to reduce macronutrients & TPN volume as patient's oral intake improves.   4) Changes to formulation: Macronutrients adjusted slightly, no additional changes.    5) Next labs/note: CMP, Mag, Phos due 9/8    Pharmacy will continue to follow.     Paige Guadalupe, PharmD, BCCCP

## 2023-09-07 NOTE — PROGRESS NOTES
Assumed pt care with RN. Pt is A&OX4 and states he is in 8/10 pain (medication given, see MAR). PICC line in place. Plan of care discussed, no further questions at this time. Personal belongings and call light within reach.

## 2023-09-07 NOTE — PROGRESS NOTES
[facility] Internal Medicine Daily Progress Note    Date of Service  9/7/2023    [facility] Team: [facility] Team   Attending: Rowena Rogers M.D.  Senior Resident: Dr. Amelia Banks MD  Intern:  Dr. Ana Gutierrez MD  Contact Number: [phone]    Chief Complaint  Nghia Coffman Jr is a 63 y.o. male admitted 9/3/2023 with     Hospital Course  Nghia Coffman Jr is a 63 y.o. male who presented 9/3/2023 with abdominal pain.  Patient has a history of renal cell carcinoma status post nephrectomy of the left kidney and partial pancreatectomy with Dr. Love.  He presented to [facility] after several days of anorexia, generalized pain localizing to the right mid back, as well as vomiting.  He has not had fever     Labs from [facility]  BUN 21, creatinine 1.6  Sodium 131, potassium 4.5, bicarb 28  Normal transaminases  WBC 8.4  Hemoglobin 12.2  Platelets 426     CT scan at the outside hospital showed complex 12 cm fluid collection in the left renal surgical bed, abscess could not be excluded.  CT of the chest shows 15 x 15 x 17 mm spiculated mass in the inferior margin of the right upper lobe abutting anterior pleural surface.  No edema or effusion.  No definite adenopathy.  Appearance of spiculated mass suggested primary malignancy rather than metastatic disease.     Patient was given 3.75 g of Zosyn and transferred here for need for urology and interventional radiology  IR drained the fluid in the left surgical bed fluid collection and placed a drain.       Interval Problem Update  -Seen by Dr. Portillo, see notes for details; abdominal fluid collection likely hematoma  -SARAH drain removed, will discontinue antibiotics as this is unlikely to be infectious  -TPN at 100% goal, start clear liquids advance as tolerated with plans to d/c TPN if adequate PO intake, start Megace for appetite stimulation  -Oncology appointment outpatient 9/14/2023 with plans to start Keytruda  -placed transfer orders to  Oncology floor  -plan for IR imaging guided biopsy of lung mass outpatient     I have discussed this patient's plan of care and discharge plan at IDT rounds today with Case Management, Nursing, Nursing leadership, and other members of the IDT team.    Consultants/Specialty  oncology and urology    Code Status  Full Code    Disposition  Medically Cleared    Review of Systems  Review of Systems   Constitutional:  Positive for malaise/fatigue and weight loss. Negative for chills and fever.        Loss of appetite +   HENT:  Negative for sore throat.    Eyes: Negative.  Negative for blurred vision and double vision.   Respiratory: Negative.  Negative for cough and shortness of breath.    Cardiovascular: Negative.    Gastrointestinal:  Positive for abdominal pain, constipation (Has BMs every other day or so) and nausea (improved). Negative for vomiting (Not since admission - just nausea).        Denies vomiting episodes since admission, but does have persistent nausea and abdominal pain that are unchanged since yesterday.    Musculoskeletal:  Negative for back pain and falls.   Skin: Negative.    All other systems reviewed and are negative.       Physical Exam  Temp:  [36.4 °C (97.6 °F)-36.9 °C (98.5 °F)] 36.9 °C (98.5 °F)  Pulse:  [85-87] 87  Resp:  [18-20] 20  BP: (138-147)/(81-88) 147/87  SpO2:  [95 %-99 %] 99 %    Physical Exam  Vitals and nursing note reviewed.   Constitutional:       General: He is not in acute distress.     Appearance: Normal appearance. He is ill-appearing. He is not toxic-appearing or diaphoretic.   HENT:      Head: Normocephalic and atraumatic.   Eyes:      General: No scleral icterus.  Cardiovascular:      Rate and Rhythm: Normal rate and regular rhythm.      Pulses: Normal pulses.      Heart sounds: Normal heart sounds. No murmur heard.     No gallop.   Pulmonary:      Effort: Pulmonary effort is normal. No respiratory distress.      Breath sounds: Normal breath sounds. No wheezing or rales.    Chest:      Chest wall: No tenderness.   Abdominal:      General: Abdomen is flat.      Palpations: Abdomen is soft.      Tenderness: Tenderness: diffuse.      Comments: Large surgical scar along anterior costal margin well-healed;   No belkis's sign   Musculoskeletal:      Cervical back: Normal range of motion and neck supple.      Comments: Drain tube in place over left lower back   PICC line in LUE   Skin:     General: Skin is warm and dry.      Capillary Refill: Capillary refill takes less than 2 seconds.   Neurological:      General: No focal deficit present.      Mental Status: He is alert and oriented to person, place, and time. Mental status is at baseline.         Fluids    Intake/Output Summary (Last 24 hours) at 9/7/2023 1932  Last data filed at 9/7/2023 1906  Gross per 24 hour   Intake --   Output 6460 ml   Net -6460 ml       Laboratory  Recent Labs     09/05/23  0233 09/06/23  0457   WBC 10.8 10.0   RBC 3.71* 3.70*   HEMOGLOBIN 10.1* 10.2*   HEMATOCRIT 33.0* 32.5*   MCV 88.9 87.8   MCH 27.2 27.6   MCHC 30.6* 31.4*   RDW 47.0 47.7   PLATELETCT 368 374   MPV 9.5 10.0     Recent Labs     09/06/23  0457 09/06/23  1445 09/07/23  0222   SODIUM 139 134* 137   POTASSIUM 3.9 4.0 4.3   CHLORIDE 105 102 104   CO2 24 24 23   GLUCOSE 120* 105* 130*   BUN 9 7* 9   CREATININE 1.05 1.00 0.96   CALCIUM 8.4* 8.5 8.9             Recent Labs     09/06/23  1445   TRIGLYCERIDE 92       Imaging  CT-ABDOMEN-PELVIS W/O   Final Result      1.  No significant interval change in the size of the large LEFT retroperitoneal retroperitoneal fluid and gas collection following placement of a drainage tube. This compresses and displaces the stomach and other loops of bowel Residual tumor is not    excluded by this exam.   2.  Grossly stable size of smaller fluid and gas collection along the course of the drainage tube      IR-PICC LINE PLACEMENT W/ GUIDANCE > AGE 5   Final Result                  Ultrasound-guided PICC placement  performed by qualified nursing staff as    above.          CT-DRAIN-RENAL-PERIRENAL   Final Result      1.  CT GUIDED PLACEMENT OF A PERCUTANEOUS DRAINAGE CATHETER IN A LEFT NEPHRECTOMY BED FLUID COLLECTION.      2.  THE CURRENT PLAN IS TO MONITOR DRAINAGE OUTPUT AND OBTAIN A FOLLOWUP CT SCAN IN 5-7 DAYS IF CLINICALLY INDICATED.      DX-CHEST-2 VIEWS   Final Result      No acute process.      OUTSIDE IMAGES-CT ABDOMEN /PELVIS   Final Result      OUTSIDE IMAGES-CT CHEST   Final Result      OUTSIDE IMAGES-US ABDOMEN   Final Result      OUTSIDE IMAGES-CT ABDOMEN /PELVIS   Final Result      IR-CONSULT ONLY-OUTPATIENT    (Results Pending)           Assessment/Plan  Problem Representation: 62yo male pt with PMHx of RCC S/P Left nephrectomy and partial pancreatectomy on 8/2/2023, was transferred from OSH due to CT evidence of fluid collection in the left nephrectomy surgical bed w/ some concerns for abscess. He is now S/P IR drain placement. Being followed by oncology and urology.     * Abdominal fluid collection  Assessment & Plan  Pt has hx of RCC left kidney, S/P left nephrectomy and partial pancreatectomy with Dr. Love on 8/2. Has had decreased appetite, malaise, and abdominal pain since then.  Recently has been having episodes of chills as well as flushing and diaphoresis.  No recorded fevers. Outside CT shows fluid collectio IR performed imaging guided drainage of the fluid and placement of drain on 9/3. Minimal output relatively unchanged on repeat CT A/P.  -Per Dr. JOHNSON, likely hematoma; dc'd SARAH drain  -d/c Zosyn started  -Pain control, antiemetics ordered   -CLD, advance as tolerated, if tolerates will d/c TPN     -Oncology consulted, pt was supposed to see Dr. Barclay next Thursday (9/14/23) for initiation of systemic therapy      Acquired solitary kidney- (present on admission)  Assessment & Plan  -Pt is s/p complete left nephrectomy on 8/2/2023, performed by Dr. Love. Indication was RCC of the left  kidney.   -Cr trended down since admission, now at baseline  -Avoid nephrotoxins, NSAIDS    Lung mass  Assessment & Plan  CT chest done at OSH prior to transfer revealed 15 x 15 x 17 mm spiculated mass in inferior margin of the right upper lobe abutting anterior pleural surface of the lung. Could be metastasis from RCC vs synchronous lung primary.   -Patient was not aware of this finding, was discussed at admission and after that.   -outpatient referral for IR imaging guided biopsy of the lung mass     Acute pancreatitis, unspecified complication status, unspecified pancreatitis type- (present on admission)  Assessment & Plan  -Elevated lipase at outside hospital however no inflammation of pancreas noted on CT abdomen. Patient did have recent open pancreatic biopsy during left radical nephrectomy -- suspect elevated amylase and lipase are 2/2 to pancreatic leak from biopsy site.  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 Pt is S/P left nephrectomy and partial pancreatectomy with Dr. Love on 8/2.    Malignant neoplasm of left kidney (HCC)- (present on admission)  Assessment & Plan  -Pt has hx of RCC left kidney, S/P left nephrectomy and partial pancreatectomy with Dr. Love on 8/2.  -Needs outpatient f/u with Dr. Barclay from [facility] that he has seen in the past. Was supposed to have an appt on 9/7 for initiation of systemic therapy.   -Seen by Dr. Barclay while inpatient as well.          VTE prophylaxis: SCDs/TEDs and heparin ppx    I have performed a physical exam and reviewed and updated ROS and Plan today (9/7/2023). In review of yesterday's note (9/6/2023), there are no changes except as documented above.

## 2023-09-07 NOTE — CARE PLAN
The patient is Stable - Low risk of patient condition declining or worsening    Shift Goals  Clinical Goals: pain management, and CA Tx  Patient Goals: same  Family Goals: NA    Progress made toward(s) clinical / shift goals:    Problem: Knowledge Deficit - Standard  Goal: Patient and family/care givers will demonstrate understanding of plan of care, disease process/condition, diagnostic tests and medications  Outcome: Progressing     Problem: Pain - Standard  Goal: Alleviation of pain or a reduction in pain to the patient’s comfort goal  Outcome: Progressing       Patient is not progressing towards the following goals:

## 2023-09-08 PROBLEM — E43 SEVERE PROTEIN-CALORIE MALNUTRITION (HCC): Status: ACTIVE | Noted: 2023-01-01

## 2023-09-08 NOTE — PROGRESS NOTES
"Pharmacy TPN Note for 2023     63 y.o. male on TPN day # 3      Admission History: Admitted on 9/3/2023 for Abdominal pain [R10.9]  Acute pancreatitis, unspecified complication status, unspecified pancreatitis type [K85.90]    Allergies:   Seasonal     Indication for TPN:   Indication: Prolonged bowel rest     Clinical Considerations for TPN:   Clinical Considerations Impacting TPN: Not Applicable         Temp (24hrs), Av.8 °C (98.2 °F), Min:36.5 °C (97.7 °F), Max:36.9 °C (98.5 °F)    Recent Labs     23  0457 23  1445 23  0222 23  0452   SODIUM 139 134* 137 136   POTASSIUM 3.9 4.0 4.3 4.3   CHLORIDE 105 102 104 101   CO2  23   BUN 9 7* 9 17   CREATININE 1.05 1.00 0.96 0.83   GLUCOSE 120* 105* 130* 114*   CALCIUM 8.4* 8.5 8.9 9.2   ASTSGOT  --  11* 9* 15   ALTSGPT  --  8 7 7   ALBUMIN  --  3.1* 3.1* 3.5   TBILIRUBIN  --  0.3 0.3 0.2   PHOSPHORUS  --  2.2* 3.0 2.7   MAGNESIUM 1.9  --  1.9 2.0     Accu-Checks  No results for input(s): \"POCGLUCOSE\" in the last 72 hours.    Vitals:    23 1953 23 0400 23 0800 23 1500   BP: (!) 150/85 130/80 129/84 115/79   Weight:       Height:           Intake/Output Summary (Last 24 hours) at 2023 1646  Last data filed at 2023 0800  Gross per 24 hour   Intake --   Output 3425 ml   Net -3425 ml       Orders Placed This Encounter   Procedures    Diet Order Diet: Cardiac     Standing Status:   Standing     Number of Occurrences:   1     Order Specific Question:   Diet:     Answer:   Cardiac [6]       TPN for past 72 hours (Show up to 3 orders; newest on the left. Changes between the two most recent orders are indicated.)       Start date and time    20232023      TPN Adult Central Line [[id number]] TPN Adult Central Line [[id number]]    Order Status  Active Completed    Last Admin  New Bag at 2023 by David Ryan R.N. New Bag at 2023 2018 by David Ryan R.N.    Frequency  " TPN DAILY TPN DAILY       Base    Clinisol  140 g 140 g    dextrose 70%  265 g 235 g    fat emulsion (soy) 20%  55 g 45 g       Additives    potassium phosphate  30 mmol 30 mmol    potassium chloride  40 mEq 40 mEq    sodium acetate  180 mEq 180 mEq    sodium chloride  180 mEq 180 mEq    magnesium sulfate  8 mEq 8 mEq    calcium GLUConate  4.65 mEq 4.65 mEq    M.T.E.-4 Tralement  1 mL 1 mL    M.V.I. Adult  10 mL 10 mL       QS Base    sterile water  625.1 mL 718 mL       Energy Contribution    Proteins  560 kcal 560 kcal    Dextrose  901 kcal 799 kcal    Lipids  550 kcal 450 kcal    Total  2,011 kcal 1,809 kcal       Electrolyte Ion Calculated Amount    Sodium  360 mEq 360 mEq    Potassium  84 mEq 84 mEq    Calcium  4.65 mEq 4.65 mEq    Magnesium  8 mEq 8 mEq    Aluminum  233.33 mEq 233.33 mEq    Phosphate  30 mmol 30 mmol    Chloride  220 mEq 220 mEq    Acetate  298.53 mEq 298.53 mEq    Chloride: Acetate Ratio  0.735 0.735       Other    Total Amino Acid  140 g 140 g    Total Amino Acid/kg  1.8 g/kg 1.8 g/kg    Glucose Infusion Rate  2.37 mg/kg/min 2.1 mg/kg/min    Volume  2,400 mL 2,400 mL    Rate  100 mL/hr 100 mL/hr    Dosing Weight  77.6 kg 77.6 kg    In[facility]            TPN Requirements:  Weight Used in TPN Calculation: 78 kg (171 lb 15.3 oz)  Total Protein Needs (g/kg):  (1.5-2 g/kg)  Total Caloric Needs (kcal):  (8549-0188 kcal/day)  Total Fluid Needs (mL):  (30-35 mL/kg/day)     Current TPN Formulation:  % of goal: 100  % kcal as lipids: 27  Grams protein/k.8  Non-protein calories: 1451  Kcals/k  Total daily calories:     Comments:  1) Nutritional Plan: Patient was planning to advance diet today with potential DC of TPN/discharge if tolerating diet. He did well with breakfast, but then developed emesis with his lunch. Per discussion with provider, will continue TPN another day to assess enteral tolerance.   2) Labs: CMP, mag and phos within normal limits; no adjustments  indicated.No data from accuchecks but BG within acceptable range on daily labs.  3) Fluids/additives: Sodium remains at NS equivalent providing 2.4 L/day.   4) Changes to formulation: None   5) Next labs/note: 9/11    Pharmacy will continue to follow.     Paige Guadalupe, PharmD, BCCCP

## 2023-09-08 NOTE — CARE PLAN
The patient is Stable - Low risk of patient condition declining or worsening    Shift Goals  Clinical Goals: pain management and increase oral intake  Patient Goals: same  Family Goals: pain management, CA Tx    Progress made toward(s) clinical / shift goals:    Problem: Knowledge Deficit - Standard  Goal: Patient and family/care givers will demonstrate understanding of plan of care, disease process/condition, diagnostic tests and medications  Outcome: Progressing     Problem: Pain - Standard  Goal: Alleviation of pain or a reduction in pain to the patient’s comfort goal  Outcome: Progressing       Patient is not progressing towards the following goals:

## 2023-09-08 NOTE — DIETARY
Nutrition Services Brief Update:    Problem: Nutritional:  Goal: Nutrition support tolerated and meeting greater than 85% of estimated needs  Outcome: Goal met. TPN at 100% goal per Pharmacy note.    RD following

## 2023-09-08 NOTE — PROGRESS NOTES
Assumed pt care with RN. Pt is A&OX4 and states he is in 3/10 pain but declines interventions at this time. PICC line in place. Plan of care dicussed, no further questions at this time. Personal belongings and call light within reach.

## 2023-09-08 NOTE — CARE PLAN
The patient is Stable - Low risk of patient condition declining or worsening    Shift Goals  Clinical Goals: pain management, discharge planning  Patient Goals: pain management, CA Tx  Family Goals: pain management, CA Tx    Progress made toward(s) clinical / shift goals: Patient's pain levels were assessed every four hours and PRN with proper interventions put in place which allowed the patient to rest and sleep comfortably throughout the night. Oncology consult has been placed in order to begin cancer treatment.      Problem: Knowledge Deficit - Standard  Goal: Patient and family/care givers will demonstrate understanding of plan of care, disease process/condition, diagnostic tests and medications  Outcome: Progressing     Problem: Pain - Standard  Goal: Alleviation of pain or a reduction in pain to the patient’s comfort goal  Outcome: Progressing     Patient is not progressing towards the following goals:

## 2023-09-08 NOTE — PROGRESS NOTES
Note to reader: this note follows the APSO format rather than the historical SOAP format. Assessment and plan located at the top of the note for ease of use.    Chief Complaint  63 y.o. year old male here with N/V.    Assessment/Plan  Interval History   63 y.o.M s/p open radical left nephrectomy and pancreatic biopsy 08/2023 with path demonstrating pT4N1 chromophobe RCC and large fluid collection in left nephrectomy bed c/w post op seroma. Now s/p IR placement of drain into nephrectomy bed 9/3.     Plan:  - I removed pigtail drain at bedside and covered drain site with dressing, advised patient/family on care  - Agree with recs per gen surg, appreciate the consult  - No urologic intervention anticipated during this admission, pt to keep outpatient urology appt as scheduled. Urology signing off, please call with questions.      Patient seen and examined    9/7: Seen by Dr Portillo today, have recommended removal of current drain due to low output, suspect fluid collection is hematoma. I removed drain at bedside, pt tolerated well. AFVSS. Labs and vitals stable. Hopeful to discharge soon. Has urology outpt follow up already scheduled.    9/6: CT shows no significant improvement in size of fluid collection despite drain. 40cc output in last 24h. WBC and Cr remain WNL, pt no longer experiencing nausea (currently NPO and has PICC line, starting TPN today). AFVSS, denies pain.    9/5. Sitting up in bed, denies pain but reports worsening nausea and RANDY. Reports hospitalist has discussed TPN.     9/4. Somewhat improving appetite however continued N/V. SARAH output serosanguinous 70cc/24hrs. Fluid amylase elevated at 1052      Discussed with patient, family, hospitalist, and with Dr Jackson, who has directed this patient's plan of care.    Review of Systems  Physical Exam   Review of Systems   Constitutional:  Negative for chills and fever.   Gastrointestinal:  Negative for abdominal pain, nausea and vomiting.    Genitourinary:  Negative for frequency, hematuria and urgency.     Vitals:    09/06/23 2008 09/07/23 0358 09/07/23 0752 09/07/23 1700   BP: 138/83 (!) 147/88 139/81 (!) 147/87   Pulse: 85 86 87 87   Resp: 19 18  20   Temp: 36.7 °C (98 °F) 36.6 °C (97.9 °F) 36.4 °C (97.6 °F) 36.9 °C (98.5 °F)   TempSrc: Temporal Temporal Temporal Temporal   SpO2: 98% 97% 95% 99%   Weight:       Height:         Physical Exam  Vitals and nursing note reviewed.   Constitutional:       Appearance: Normal appearance.   HENT:      Head: Normocephalic and atraumatic.      Nose: Nose normal.   Eyes:      Conjunctiva/sclera: Conjunctivae normal.   Pulmonary:      Effort: Pulmonary effort is normal.   Abdominal:      General: There is no distension.      Palpations: Abdomen is soft.      Comments: SARAH in place with scant serous output in bulb   Neurological:      Mental Status: He is alert.   Psychiatric:         Mood and Affect: Mood normal.         Behavior: Behavior normal.          Hematology Chemistry   Lab Results   Component Value Date/Time    WBC 10.0 09/06/2023 04:57 AM    HEMOGLOBIN 10.2 (L) 09/06/2023 04:57 AM    HEMATOCRIT 32.5 (L) 09/06/2023 04:57 AM    PLATELETCT 374 09/06/2023 04:57 AM     Lab Results   Component Value Date/Time    SODIUM 137 09/07/2023 02:22 AM    POTASSIUM 4.3 09/07/2023 02:22 AM    CHLORIDE 104 09/07/2023 02:22 AM    CO2 23 09/07/2023 02:22 AM    GLUCOSE 130 (H) 09/07/2023 02:22 AM    BUN 9 09/07/2023 02:22 AM    CREATININE 0.96 09/07/2023 02:22 AM         Labs not explicitly included in this progress note were reviewed by the author.   Radiology/imaging not explicitly included in this progress note was reviewed by the author.     Radiology images reviewed, Labs reviewed and Medications reviewed

## 2023-09-09 NOTE — PROGRESS NOTES
[facility] Internal Medicine Daily Progress Note    Date of Service  9/8/2023    [facility] Team: [facility] Team   Attending: Rowena Rogers M.D.  Senior Resident: Dr. Amelia Banks MD  Intern:  Dr. Ana Gutierrez MD  Contact Number: [phone]    Chief Complaint  Nghia Coffman Jr is a 63 y.o. male admitted 9/3/2023 with     Hospital Course  Nghia Coffman Jr is a 63 y.o. male who presented 9/3/2023 with abdominal pain.  Patient has a history of renal cell carcinoma status post nephrectomy of the left kidney and partial pancreatectomy with Dr. Love.  He presented to [facility] after several days of anorexia, generalized pain localizing to the right mid back, as well as vomiting.  He has not had fever     Labs from [facility]  BUN 21, creatinine 1.6  Sodium 131, potassium 4.5, bicarb 28  Normal transaminases  WBC 8.4  Hemoglobin 12.2  Platelets 426     CT scan at the outside hospital showed complex 12 cm fluid collection in the left renal surgical bed, abscess could not be excluded.  CT of the chest shows 15 x 15 x 17 mm spiculated mass in the inferior margin of the right upper lobe abutting anterior pleural surface.  No edema or effusion.  No definite adenopathy.  Appearance of spiculated mass suggested primary malignancy rather than metastatic disease.     Patient was given 3.75 g of Zosyn and transferred here for need for urology and interventional radiology for SARAH drain placement   Seen by Dr. Portillo, see notes for details; abdominal fluid collection likely hematoma. SARAH drain removed, will discontinue antibiotics as this is unlikely to be infectious.        Interval Problem Update  -Prior to discharge patient goals include: P.o. pain management p.o. antiemetics, and tolerating adequate p.o. intake  -Patient reports tolerating clear liquids, not able to tolerate regular diet (vomit x1), will initiate full liquids and reassess -tolerates, will discontinue TPN, DC PICC   -Continue  with [facility]  -Oncology appointment outpatient 9/14/2023     I have discussed this patient's plan of care and discharge plan at IDT rounds today with Case Management, Nursing, Nursing leadership, and other members of the IDT team.    Consultants/Specialty  oncology and urology    Code Status  Full Code    Disposition  The patient is not medically cleared for discharge to home or a post-acute facility.        Review of Systems  Review of Systems   Constitutional:  Positive for malaise/fatigue and weight loss. Negative for chills and fever.        Loss of appetite +   Eyes: Negative.  Negative for blurred vision and double vision.   Respiratory: Negative.  Negative for cough and shortness of breath.    Cardiovascular: Negative.    Gastrointestinal:  Positive for abdominal pain and nausea (improved). Negative for constipation (Has BMs every other day or so) and vomiting (Not since admission - just nausea).        1 episode of postprandial emesis   Musculoskeletal:  Negative for back pain and falls.   Skin: Negative.    All other systems reviewed and are negative.       Physical Exam  Temp:  [36.5 °C (97.7 °F)-36.9 °C (98.4 °F)] 36.5 °C (97.7 °F)  Pulse:  [68-88] 68  Resp:  [17-20] 17  BP: (115-150)/(79-85) 115/79  SpO2:  [97 %] 97 %    Physical Exam  Vitals and nursing note reviewed.   Constitutional:       General: He is not in acute distress.     Appearance: Normal appearance. He is not ill-appearing, toxic-appearing or diaphoretic.   HENT:      Head: Normocephalic and atraumatic.   Eyes:      General: No scleral icterus.  Cardiovascular:      Rate and Rhythm: Normal rate and regular rhythm.      Pulses: Normal pulses.      Heart sounds: Normal heart sounds. No murmur heard.     No gallop.   Pulmonary:      Effort: Pulmonary effort is normal. No respiratory distress.      Breath sounds: Normal breath sounds. No wheezing or rales.   Chest:      Chest wall: No tenderness.   Abdominal:      General: Abdomen is flat.       Palpations: Abdomen is soft.      Tenderness: Tenderness: diffuse.      Comments: Large surgical scar along anterior costal margin well-healed;   No belkis's sign   Musculoskeletal:      Cervical back: Normal range of motion and neck supple.      Comments: R paraspinal/mid back pain  PICC line in LUE   Skin:     General: Skin is warm and dry.      Capillary Refill: Capillary refill takes less than 2 seconds.      Coloration: Skin is pale.   Neurological:      General: No focal deficit present.      Mental Status: He is alert and oriented to person, place, and time. Mental status is at baseline.         Fluids    Intake/Output Summary (Last 24 hours) at 9/8/2023 1739  Last data filed at 9/8/2023 0800  Gross per 24 hour   Intake --   Output 3425 ml   Net -3425 ml       Laboratory  Recent Labs     09/06/23  0457 09/08/23  0452   WBC 10.0 9.2   RBC 3.70* 4.06*   HEMOGLOBIN 10.2* 10.9*   HEMATOCRIT 32.5* 35.2*   MCV 87.8 86.7   MCH 27.6 26.8*   MCHC 31.4* 31.0*   RDW 47.7 47.2   PLATELETCT 374 429   MPV 10.0 9.4     Recent Labs     09/06/23  1445 09/07/23  0222 09/08/23  0452   SODIUM 134* 137 136   POTASSIUM 4.0 4.3 4.3   CHLORIDE 102 104 101   CO2 24 23 23   GLUCOSE 105* 130* 114*   BUN 7* 9 17   CREATININE 1.00 0.96 0.83   CALCIUM 8.5 8.9 9.2             Recent Labs     09/06/23  1445   TRIGLYCERIDE 92       Imaging  CT-ABDOMEN-PELVIS W/O   Final Result      1.  No significant interval change in the size of the large LEFT retroperitoneal retroperitoneal fluid and gas collection following placement of a drainage tube. This compresses and displaces the stomach and other loops of bowel Residual tumor is not    excluded by this exam.   2.  Grossly stable size of smaller fluid and gas collection along the course of the drainage tube      IR-PICC LINE PLACEMENT W/ GUIDANCE > AGE 5   Final Result                  Ultrasound-guided PICC placement performed by qualified nursing staff as    above.           CT-DRAIN-RENAL-PERIRENAL   Final Result      1.  CT GUIDED PLACEMENT OF A PERCUTANEOUS DRAINAGE CATHETER IN A LEFT NEPHRECTOMY BED FLUID COLLECTION.      2.  THE CURRENT PLAN IS TO MONITOR DRAINAGE OUTPUT AND OBTAIN A FOLLOWUP CT SCAN IN 5-7 DAYS IF CLINICALLY INDICATED.      DX-CHEST-2 VIEWS   Final Result      No acute process.      OUTSIDE IMAGES-CT ABDOMEN /PELVIS   Final Result      OUTSIDE IMAGES-CT CHEST   Final Result      OUTSIDE IMAGES-US ABDOMEN   Final Result      OUTSIDE IMAGES-CT ABDOMEN /PELVIS   Final Result      IR-CONSULT ONLY-OUTPATIENT    (Results Pending)           Assessment/Plan  Problem Representation: 64yo male pt with PMHx of RCC S/P Left nephrectomy and partial pancreatectomy on 8/2/2023, was transferred from OSH due to CT evidence of fluid collection in the left nephrectomy surgical bed w/ some concerns for abscess which was determined to be hematoma. Being followed by oncology and urology.     * Abdominal fluid collection  Assessment & Plan  Pt has hx of RCC left kidney, S/P left nephrectomy and partial pancreatectomy with Dr. Love on 8/2. Has had decreased appetite, malaise, and abdominal pain since then.  Recently has been having episodes of chills as well as flushing and diaphoresis.  No recorded fevers. Outside CT shows fluid collectio IR performed imaging guided drainage of the fluid and placement of drain on 9/3. Minimal output relatively unchanged on repeat CT A/P.  -Per Dr. JOHNSON, likely hematoma; dc'd SARAH drain  -d/c Zosyn started  -Pain control, antiemetics ordered   -CLD, advance as tolerated, if tolerates will d/c TPN     -Oncology consulted, pt was supposed to see Dr. Barclay next Thursday (9/14/23) for initiation of systemic therapy      Severe protein-calorie malnutrition (HCC)  Assessment & Plan  25lb weight loss over 3 months       Acquired solitary kidney- (present on admission)  Assessment & Plan  -Pt is s/p complete left nephrectomy on 8/2/2023, performed by   Ivan. Indication was RCC of the left kidney.   -Cr trended down since admission, now at baseline  -Avoid nephrotoxins, NSAIDS    Lung mass  Assessment & Plan  CT chest done at OSH prior to transfer revealed 15 x 15 x 17 mm spiculated mass in inferior margin of the right upper lobe abutting anterior pleural surface of the lung. Could be metastasis from RCC vs synchronous lung primary.   -Patient was not aware of this finding, was discussed at admission and after that.   -outpatient referral for IR imaging guided biopsy of the lung mass     Acute pancreatitis, unspecified complication status, unspecified pancreatitis type- (present on admission)  Assessment & Plan  -Elevated lipase at outside hospital however no inflammation of pancreas noted on CT abdomen. Patient did have recent open pancreatic biopsy during left radical nephrectomy -- suspect elevated amylase and lipase are 2/2 to pancreatic leak from biopsy site.  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 Pt is S/P left nephrectomy and partial pancreatectomy with Dr. Love on 8/2.    Malignant neoplasm of left kidney (HCC)- (present on admission)  Assessment & Plan  -Pt has hx of RCC left kidney, S/P left nephrectomy and partial pancreatectomy with Dr. Love on 8/2.  -Needs outpatient f/u with Dr. Barclay from [facility] that he has seen in the past. Was supposed to have an appt on 9/7 for initiation of systemic therapy.   -Seen by Dr. Barclay while inpatient as well.          VTE prophylaxis: SCDs/TEDs and heparin ppx    I have performed a physical exam and reviewed and updated ROS and Plan today (9/8/2023). In review of yesterday's note (9/7/2023), there are no changes except as documented above.

## 2023-09-09 NOTE — PROGRESS NOTES
Pt cleared for DC. Aox4. VSS. Controlled pain. OOB with steady gait. Wife at bedside. Provided and educated on discharge instructions. States understanding. Meds to beds provided to pt. Pt DC from unit via wheelchair. Wife to take him home.

## 2023-09-09 NOTE — PROGRESS NOTES
Assumed care of pt at 0700. Pt Aox4. Reports 1/10 pain to RLQ, states that it was worse overnight and has much improved. Denies SOB. Call bell within reach.

## 2023-09-09 NOTE — PROGRESS NOTES
Pt able to tolerate breakfast so far, able to eat cream of wheat and milk without issue. Pt c/o of ongoing intermittent RLQ pain, pain increased for about 10 seconds then goes away back to 1/10. Pt reports this started last night. Updated Dr. Gutierrez. Plan to get a CBC.

## 2023-09-09 NOTE — CARE PLAN
The patient is Stable - Low risk of patient condition declining or worsening    Shift Goals  Clinical Goals: increase oral intake, discharge  Patient Goals: pain management, discharge  Family Goals: increased oral intake, discharge    Progress made toward(s) clinical / shift goals:  Progress made toward(s) clinical / shift goals:  Patient's pain levels were assessed every four hours with proper interventions put in place which allowed him to rest comfortably throughout the day.       Problem: Knowledge Deficit - Standard  Goal: Patient and family/care givers will demonstrate understanding of plan of care, disease process/condition, diagnostic tests and medications  Outcome: Progressing     Problem: Pain - Standard  Goal: Alleviation of pain or a reduction in pain to the patient’s comfort goal  Outcome: Progressing       Patient is not progressing towards the following goals:

## 2023-09-09 NOTE — CARE PLAN
"Requested Prescriptions   Pending Prescriptions Disp Refills     amLODIPine (NORVASC) 5 MG tablet [Pharmacy Med Name: AMLODIPINE  Last Written Prescription Date:  9/24/19  Last Fill Quantity: 90,  # refills: 0   Last Office Visit: 9/26/2019   Future Office Visit:      BESYLATE 5 MG TAB] 90 tablet 0     Sig: TAKE 1 TABLET BY MOUTH EVERY DAY       Calcium Channel Blockers Protocol  Passed - 12/11/2019  4:39 AM        Passed - Blood pressure under 140/90 in past 12 months     BP Readings from Last 3 Encounters:   11/13/19 127/84   10/25/19 116/76   09/26/19 118/70           Passed - Recent (12 mo) or future (30 days) visit within the authorizing provider's specialty     Patient has had an office visit with the authorizing provider or a provider within the authorizing providers department within the previous 12 mos or has a future within next 30 days. See \"Patient Info\" tab in inbasket, or \"Choose Columns\" in Meds & Orders section of the refill encounter.            Passed - Medication is active on med list        Passed - Patient is age 18 or older        Passed - No active pregnancy on record        Passed - Normal serum creatinine on file in past 12 months     Recent Labs   Lab Test 10/04/19  0829   CR 0.67           Passed - No positive pregnancy test in past 12 months          " The patient is Stable - Low risk of patient condition declining or worsening    Shift Goals  Clinical Goals: increase oral intake  Patient Goals: go home  Family Goals: increased oral intake, discharge    Progress made toward(s) clinical / shift goals:    Problem: Knowledge Deficit - Standard  Goal: Patient and family/care givers will demonstrate understanding of plan of care, disease process/condition, diagnostic tests and medications  Outcome: Progressing     Problem: Depression  Goal: Patient and family/caregiver will verbalize accurate information about at least two of the possible causes of depression, three-four of the signs and symptoms of depression  Outcome: Progressing     Problem: Pain - Standard  Goal: Alleviation of pain or a reduction in pain to the patient’s comfort goal  Outcome: Progressing       Patient is not progressing towards the following goals:

## 2023-09-09 NOTE — DISCHARGE INSTRUCTIONS
Please return to the emergency room if you are unable to tolerate food or water intake, pain is not well controlled,  you begin having a fever/chills.  Advance your diet as tolerated.     Follow up with oncology as scheduled 9/14

## 2023-09-10 NOTE — DISCHARGE SUMMARY
Discharge Summary    CHIEF COMPLAINT ON ADMISSION  Abdominal pain       Reason for Admission  Abdominal fluid collection     Admission Date  9/3/2023    Discharge Date  9/9/2023    CODE STATUS  Full Code    HPI & HOSPITAL COURSE  Nghia Coffman Jr is a 63 y.o. male who presented 9/3/2023 with abdominal pain.  Patient has a history of renal cell carcinoma status post nephrectomy of the left kidney and open pancreatic biopsy with Dr. Love/  on 8/2/2023. He presented to [facility] after several days of anorexia, generalized pain localizing to the right mid back, as well as vomiting.  CT scan at the outside hospital showed complex fluid collection in the left renal surgical bed and new spiculated mass in the inferior margin of the right upper lobe abutting anterior pleural surface.    IR placed drain for suspected pancreatic fluid leak from surgical site (fluid amylase >1000).  negative. Drain with low output, repeat CT unchanged. Started on TPN.   Seen by surgical oncology, abdominal fluid collection likely hematoma. SARAH drain removed, discontinued antibiotics as this is unlikely to be infectious.  Started megace for appetite stimulation, initiated. Pt tolerating PO intake, pain controlled. TPN discontinued.        Physical Exam  Vitals reviewed.   Constitutional:       General: He is not in acute distress.     Appearance: Normal appearance. He is not ill-appearing, toxic-appearing or diaphoretic.   HENT:      Head: Normocephalic and atraumatic.   Eyes:      General: No scleral icterus.  Cardiovascular:      Rate and Rhythm: Normal rate and regular rhythm.      Pulses: Normal pulses.      Heart sounds: Normal heart sounds. No murmur heard.     No gallop.   Pulmonary:      Effort: Pulmonary effort is normal. No respiratory distress.      Breath sounds: Normal breath sounds. No wheezing or rales.   Chest:      Chest wall: No tenderness.   Abdominal:      General: Abdomen is flat. There  is no distension.      Palpations: Abdomen is soft.      Tenderness: Tenderness: diffuse.      Comments: Large surgical scar along anterior costal margin well-healed;   No belkis's sign   Musculoskeletal:      Cervical back: Normal range of motion and neck supple.      Comments: R paraspinal/mid back pain  PICC line in LUE   Skin:     General: Skin is warm and dry.      Capillary Refill: Capillary refill takes less than 2 seconds.      Coloration: Skin is pale.      Comments: SARAH drain site c/d/i   Neurological:      General: No focal deficit present.      Mental Status: He is alert and oriented to person, place, and time. Mental status is at baseline.              Therefore, he is discharged in fair and stable condition to home with close outpatient follow-up.    The patient met 2-midnight criteria for an inpatient stay at the time of discharge.        FOLLOW UP ITEMS POST DISCHARGE  PCP: PO intake, pain management, IR biopsy of RUL   Oncology       DISCHARGE DIAGNOSES  Principal Problem:    Abdominal fluid collection (Chronic) (POA: Unknown)  Active Problems:    Malignant neoplasm of left kidney (HCC) (POA: Yes)    Malignant tumor of body of pancreas (HCC) (POA: Yes)    Abdominal pain (POA: Yes)    Acute pancreatitis, unspecified complication status, unspecified pancreatitis type (POA: Yes)    Lung mass (Chronic) (POA: Unknown)    Acquired solitary kidney (Chronic) (POA: Yes)    Severe protein-calorie malnutrition (HCC) (POA: Unknown)  Resolved Problems:    * No resolved hospital problems. *      FOLLOW UP  No future appointments.  No follow-up provider specified.    MEDICATIONS ON DISCHARGE     Medication List        START taking these medications        Instructions   acetaminophen 325 MG Tabs  Commonly known as: Tylenol   Take 2 Tablets by mouth every four hours as needed for Mild Pain or Moderate Pain.  Dose: 650 mg     megestrol 40 MG/ML Susp  Start taking on: September 10, 2023  Commonly known as: Megace    Take 20 mL by mouth every day for 30 days.  Dose: 800 mg     omeprazole 20 MG delayed-release capsule  Commonly known as: PriLOSEC   Take 1 Capsule by mouth every day.  Dose: 20 mg     oxyCODONE immediate-release 5 MG Tabs  Commonly known as: Roxicodone   Take 1 Tablet by mouth every four hours as needed for Severe Pain for up to 5 days.  Dose: 5 mg     prochlorperazine 10 MG Tabs  Commonly known as: Compazine   Take 1 Tablet by mouth every 6 hours as needed for Nausea/Vomiting (2nd line nausea).  Dose: 10 mg     Promethegan 25 MG Supp  Generic drug: promethazine   Insert 1 Suppository into the rectum every 6 hours as needed for Nausea/Vomiting (If vomiting and not tolerating other nausea meds).  Dose: 25 mg            CONTINUE taking these medications        Instructions   enoxaparin 30 MG/0.3ML Sosy inj  Commonly known as: Lovenox   Inject 40 mg under the skin every 12 hours.  Dose: 40 mg     ondansetron 4 MG Tbdp  Commonly known as: Zofran ODT   Take 1 Tablet by mouth every 6 hours as needed for Nausea/Vomiting.  Dose: 4 mg            STOP taking these medications      metoclopramide 10 MG Tabs  Commonly known as: Reglan     oxyCODONE-acetaminophen  MG Tabs  Commonly known as: Percocet-10     traMADol 50 MG Tabs  Commonly known as: Ultram              Allergies  Allergies   Allergen Reactions    Seasonal        DIET  No orders of the defined types were placed in this encounter.      ACTIVITY  As tolerated.  Weight bearing as tolerated    CONSULTATIONS  Urology  Hematology Oncology     PROCEDURES  PICC placement  CT guided drain placement     LABORATORY  Lab Results   Component Value Date    SODIUM 136 09/08/2023    POTASSIUM 4.3 09/08/2023    CHLORIDE 101 09/08/2023    CO2 23 09/08/2023    GLUCOSE 114 (H) 09/08/2023    BUN 17 09/08/2023    CREATININE 0.83 09/08/2023        Lab Results   Component Value Date    WBC 9.7 09/09/2023    HEMOGLOBIN 10.9 (L) 09/09/2023    HEMATOCRIT 34.4 (L) 09/09/2023     PLATELETCT 445 09/09/2023        Total time of the discharge process was 60 minutes.

## 2023-09-27 NOTE — OR NURSING
Assumed care in pre op . Patient is sitting on the weelchair , looks weak , and with faciall grimaced , He reported that he is in pain in his right lower back 7/10. Didn't take his oxycodone this morning.   V/s taken and recorded.   Consent form verified and signed by the patient.  Patient took his pain meds after approval from DR. Reardon.   Belongings to grabiel hernandez.

## 2023-09-27 NOTE — PROGRESS NOTES
Pt presents to CT4. Patient was consented by MD at bedside, confirmed by this RN and consent at bedside. Pt transferred to CT table in supine position. Patient underwent a right lung biopsy by Dr. Reardon. Procedure site was marked by MD and verified using imaging guidance. Pt placed on monitor, prepped and draped in a sterile fashion. Vitals were taken every 5 minutes and remained stable during procedure (see doc flow sheet for results). CO2 waveform capnography was monitored and remained WNL throughout procedure. Report called to Valeria ROBERTS. Pt transported by stretcher with RN to .     Specimen: 4 core in formalin hand delivered to lab.    Angiodynamics  Biosentry  Tract Sealant System  REF: [id number]  LOT: [id number]  EXP: 06.30.2026

## 2023-09-27 NOTE — OR NURSING
1615 Received patient from PACU and received report from Ramona ROBERTS. [facility], VS checked. Reports no pain and no nausea. Site covered with gauze and tegaderm. Dressing clean, dry, and intact.     1622 Wife at bedside. Patient tolerating PO fluids, patient dressed.    1630 Discharge instructions covered with wife, verbalizes understanding. All questions answered at this time.     1645 IV removed. Patient discharged out via wheelchair by RN to responsible adult. All belongings sent with patient. Discharge instructions reviewed and signed. All questions answered.

## 2023-09-27 NOTE — OR NURSING
1348 care assumed, report received from RN, no C/Co pain, VSS, chest site is clean and dry.     1410 1st chest x-ray resulted with no evidence of pneumothorax, pt was given water and a snack.     1500 called and spoke with pts wife, updated on plan of care.     1520 X-ray at bedside for second chest x-ray, waiting on phase 2 availability for D/C    1615 pt report given to D/C ALEJANDRA Raya pt second chest x-ray shows no evidence of pneumothorax, pt updated on plan of care. Pt taken over to D/C with RN sherry sahni.

## 2023-09-27 NOTE — DISCHARGE INSTRUCTIONS
If any questions arise, call your provider.  If your provider is not available, please feel free to call the Surgical Center at [phone].    MEDICATIONS: Resume taking daily medication.  Take prescribed pain medication with food.  If no medication is prescribed, you may take non-aspirin pain medication if needed.  PAIN MEDICATION CAN BE VERY CONSTIPATING.  Take a stool softener or laxative such as senokot, pericolace, or milk of magnesia if needed.    Needle Biopsy, Care After  These instructions tell you how to care for yourself after your procedure. Your doctor may give you more instructions. Call your doctor if you have any problems or questions.  What can I expect after the procedure?  After a needle biopsy, it is common to have these things at the puncture site:  Soreness.  Bruising.  Mild pain.  These things should go away after a few days.  Follow these instructions at home:  Puncture site care    Wash your hands with soap and water for at least 20 seconds before and after you change your bandage (dressing). If you cannot use soap and water, use hand .  Follow instructions from your doctor about how to take care of your puncture site. This includes:  When and how to change your bandage.  When to take off your bandage.  Check your puncture site every day for signs of infection. Check for:  Redness, swelling, or more pain.  Fluid or blood.  Warmth.  Pus or a bad smell.  General instructions  Go back to your normal activities when your doctor says that it is safe. Ask if there is anything you cannot do as you heal.  Take over-the-counter and prescription medicines only as told by your doctor.  Do not take baths, swim, or use a hot tub. Ask your doctor when you can shower.  Keep all follow-up visits. Ask if you need an appointment to get your biopsy results.  Contact a doctor if:  You have a fever.  You have redness, swelling, or more pain at the puncture site, and it lasts longer than a few  days.  You have fluid, blood, or pus coming from the puncture site.  Your puncture site feels warm.  Get help right away if:  You have very bad bleeding from the puncture site.  Summary  After the procedure, it is common to have soreness, bruising, or mild pain at the puncture site.  Check your puncture site every day for signs of infection, such as redness, swelling, or more pain.  Get help right away if you have very bad bleeding from your puncture site.  This information is not intended to replace advice given to you by your health care provider. Make sure you discuss any questions you have with your health care provider.  Document Revised: 06/08/2022 Document Reviewed: 06/08/2022  Elsevier Patient Education © 2023 [employer]

## 2023-09-27 NOTE — OR NURSING
1312 Right upper chest dressing CDI.  No c/o  pain, or n/v.   Waiting on chest x ray before sending pt to phase II.      1340 X ray at bedside     1348 Hand off care to Ramona Paredes

## 2023-09-27 NOTE — OR NURSING
1209 recv'd pt from IR.  AOx4.  C/o mild pain to right chest 3/10 at surgical site.  Dressing d/I.  No c/o N/V.  MARIE on own.    SR 83.  HOB up 30 degrees.  1225 Family updated, IV saline loc.  1225 dressing d/i  1240 dressing d/I.   1255 dressing to chest d/I.  1310  VS stable.  Dressing d/I.  Radiology on the way, for first cxr.  1310  Report to Heidi Singh RN

## 2023-09-27 NOTE — OR SURGEON
Immediate Post- Operative Note    PostOp Diagnosis: RUL LUNG NODULE      Procedure(s): CT GUIDED RIGHT LUNG BIOPSY    20G CORES X 4 IN FORMALIN    BIOSENTRY DEPLOYED AT 3.2 CM    NO PTX      Estimated Blood Loss: <5CC      FINDINGS: NEEDLE CONFIRMED. AVOIDED INT MAMMARY ARTERY WITH OBLIQUE NEEDLE APPROACH        Complications: NONE          9/27/2023     11:59 AM     Trae Reardon M.D.

## 2023-11-11 PROBLEM — K56.609 SBO (SMALL BOWEL OBSTRUCTION) (HCC): Status: ACTIVE | Noted: 2023-01-01

## 2023-11-12 PROBLEM — D64.9 ANEMIA: Status: ACTIVE | Noted: 2023-01-01

## 2023-11-12 PROBLEM — A04.72 C. DIFFICILE COLITIS: Status: ACTIVE | Noted: 2023-01-01

## 2023-11-12 NOTE — CONSULTS
DATE OF CONSULTATION:  11/12/2023     REFERRING PHYSICIAN:   Len Stock M.D.     CONSULTING PHYSICIAN:  Georgina Edward M.D.     REASON FOR CONSULTATION:  I have been asked by  to see the patient in surgical consultation for evaluation of SBO.    HISTORY OF PRESENT ILLNESS: The patient is a 63 year-old White man who presents to the Emergency Department with a 4-day history of moderate generalized abdominal pain. The pain is associated with constipation.  The patient currently under treatment for metastatic renal cell carcinoma. The patient has undergone L nephrectomy. The patient denies any previous surgery for obstructive symptoms..     PAST MEDICAL HISTORY:  has a past medical history of Anesthesia (09/19/2023), Bowel habit changes (09/19/2023), Breath shortness (09/19/2023), Cancer (HCC) (09/19/2023), Delayed emergence from general anesthesia, Disorder of thyroid, Pain (09/19/2023), Pneumonia (2000), Renal disorder (09/19/2023), and Sleep apnea (09/19/2023).    He has no past medical history of Indigestion.    PAST SURGICAL HISTORY:  has a past surgical history that includes thyroid lobectomy (2018); inguinal hernia repair (Left, 2018); sinuscopy (1988); pr remv kidney,radical (08/02/2023); and biopsy, pancreas (08/02/2023).    ALLERGIES:   Allergies   Allergen Reactions    Seasonal Runny Nose     Itchy eyes       CURRENT MEDICATIONS:    Home Medications    **Home medications have not yet been reviewed for this encounter**         FAMILY HISTORY: family history is not on file.    SOCIAL HISTORY:  reports that he has never smoked. He has never used smokeless tobacco. He reports that he does not currently use alcohol. He reports that he does not use drugs.    REVIEW OF SYSTEMS: Comprehensive review of systems is negative with the exception of the aforementioned HPI, PMH, and PSH bullets in accordance with CMS guidelines.    PHYSICAL EXAMINATION:    Physical Exam  Cardiovascular:      Rate and Rhythm:  Normal rate.   Pulmonary:      Effort: Pulmonary effort is normal.   Abdominal:      General: There is distension.      Tenderness: There is abdominal tenderness.      Comments: Well healed L nephrectomy scar  No peritoneal signs   Musculoskeletal:         General: Normal range of motion.      Cervical back: Neck supple.   Skin:     General: Skin is warm.   Neurological:      General: No focal deficit present.      Mental Status: He is alert.         LABORATORY VALUES:   Recent Labs     11/12/23  0011   WBC 10.0   RBC 2.89*   HEMOGLOBIN 7.5*   HEMATOCRIT 25.5*   MCV 88.2   MCH 26.0*   MCHC 29.4*   RDW 50.8*   PLATELETCT 471*   MPV 9.4     Recent Labs     11/12/23  0011   SODIUM 133*   POTASSIUM 4.5   CHLORIDE 96   CO2 25   GLUCOSE 101*   BUN 15   CREATININE 1.06   CALCIUM 9.2     Recent Labs     11/12/23  0011   ASTSGOT 13   ALTSGPT 7   TBILIRUBIN 0.4   ALKPHOSPHAT 168*   GLOBULIN 3.8*   INR 1.29*   AMMONIA 10*     Recent Labs     11/12/23 0011   APTT 32.9   INR 1.29*        IMAGING:   DX-ABDOMEN FOR TUBE PLACEMENT   Final Result      1.  Presumed enteric tube projects over the upper mid thorax likely in the thoracic esophagus above the level of the aster. It is  recommended this be removed and reinserted or readjusted.      2.  Findings were originally called to the patient's nurse on 11/12/2023 at 1:23 AM however she was unavailable due to assisting another patient. Findings were discussed with the patient's nurse Haja, 1:45 AM on 11/12/2023.      OUTSIDE IMAGES-CT ABDOMEN /PELVIS   Final Result      DX-ABDOMEN FOR TUBE PLACEMENT    (Results Pending)       ASSESSMENT AND PLAN:     * SBO (small bowel obstruction) (HCC)- (present on admission)  Assessment & Plan  4 day history of inc distention and no BM  Recent hx of L nephrectomy with metastatic renal cell  CT shows SBO and l retroperitoneal mass/collection  NGT   Non op management        DISPOSITION: Medical evaluation and admission. The patient was  admitted to the Medical Service prior to surgical consultation. [facility] will follow.     ____________________________________     Georgina Edward M.D.    DD: 11/12/2023  2:36 AM    AAST Grading System for EGS Conditions  ACS NSQIP Surgical Risk Calculator

## 2023-11-12 NOTE — ASSESSMENT & PLAN NOTE
Open left radical nephrectomy 8/2/2023  Metastatic to lungs, liver, lymph nodes  Dilaudid PCA  Comfort care

## 2023-11-12 NOTE — PROGRESS NOTES
[facility]         - I spoke and discussed the case with the ER physician, Dr. Garces  - Chief complaint: Abdominal distention  - Pertinent history & patient course: 63-year-old male with a past medical history of metastatic renal cell carcinoma who presents for constipation and abdominal distention.  He is found to have a small bowel obstruction.  The outlying facility does not have surgery support.                      Please inform the \"Triage Coord.-RTOC\" through Voalte upon arrival of the patient to [facility] for assignment of a hospitalist to perform admission. Reach out to the assigned hospitalist (assigned by RTOC) for any questions or concerns regarding the care of this patient.          I spent a total of 35 minutes of non face to face time performing additional research, reviewing medical records from transferring facility, discussing plan of care with other healthcare providers. Start time: 7 45 pm. End time: 8:20 pm    "

## 2023-11-12 NOTE — WOUND TEAM
[facility]  Inpatient Services  Wound and Skin Care Brief Evaluation    Admission Date: 11/11/2023     Last order of IP CONSULT TO WOUND CARE was found on 11/12/2023 from Hospital Encounter on 11/11/2023     HPI, PMH, SH: Reviewed    No chief complaint on file.    Diagnosis: SBO (small bowel obstruction) ([facility]) [K56.609]    Unit where seen by Wound Team: T403/02     Wound consult placed regarding Sacrococcygeal area. Chart and images reviewed. This discussed with bedside RN, Torie. This clinician in to assess patient. Patient pleasant and agreeable. Pt was able to turn self to the left side. Non-selectively debrided with No rinse foam soap and Moist warm washcloth. Barrier paste obtained and applied. Pts bilateral heels assessed and are intact, heel offloading dressings applied.     No pressure injuries or advanced wound care needs identified. Wound consult completed. No further follow up unless indicated and consulted.                    PREVENTATIVE INTERVENTIONS:    Q shift Tk - performed per nursing policy  Q shift pressure point assessments - performed per nursing policy    Surface/Positioning  Standard/trauma mattress - Currently in Place  Reposition q 2 hours - Currently in Place  Waffle overlay  - Currently in Place    Offloading/Redistribution  Heel offloading dressing (Silicone dressing) - Applied this Visit  Float Heels off Bed with Pillows - Applied this Visit           Respiratory  N/A    Containment/Moisture Prevention    Dri-courtney pad - Currently in Place  Barrier paste - Applied this Visit    Mobilization      Unable to assess

## 2023-11-12 NOTE — ASSESSMENT & PLAN NOTE
4 day history of inc distention and no BM  Recent hx of L nephrectomy with metastatic renal cell  CT shows SBO and retroperitoneal mass/collection  NGT to suction   11/14 Continued high volume output from NG tube, ongoing distension  - Small bowel follow through ordered

## 2023-11-12 NOTE — DIETARY
"Nutrition services: Day 1 of admit.  Nghia Coffman Jr is a 63 y.o. male with admitting DX of SBO (small bowel obstruction)    Consult received for wt loss (34 lbs or more in 3 months), poor PO per admit screen. Met with pt at bedside. Pt appeared thin with severe fat loss evidenced by hollowed orbitals, hollowed buccal region and prominent zygomatic arches; severe muscle loss evidenced by scooping temporals, prominent brow bone and prominent depression to dorsal hand area. Pt reports an ongoing poor appetite. He stated he has been trying to eat more at home. Pt shared that his appetite started to decrease in July approximately 2 weeks prior to his nephrectomy. Oral nutrition supplements discussed with pt once diet advances. Pt politely declined stating supplements sometimes cause diarrhea. Pt reports a previous UBW of ~ 195 lbs.     Assessment:  Height: 185.4 cm (6' 1\") ('s license)  Weight: 70.4 kg (155 lb 3.3 oz)  Body mass index is 20.48 kg/m²., BMI classification: normal  Diet/Intake: NPO    Evaluation:   Pt transferred from outside facility following CT scan that showed metastatic liver disease, ascites and mechanical small bowel obstruction.   PMH: metastatic renal cell carcinoma, shortness of breath, pneumonia  Renal mass found incidentally in June 2023. Open left radical nephrectomy 8/2/23.  Pt was admitted 9/3 for abdominal pain. Pt reported poor PO and wt loss at that time. He was also diagnosed with malnutrition during that admission.   Pt received TPN during that admission 2' acute pancreatitis.   Wt hx per chart review: 171 lbs 9/6/23, 183 lbs 8/2/23. Wt loss of 9% in two months is severe.   Labs and meds reviewed.   NGT in place to low, intermittent suction.  Pt was seen by wound team today. No pressure injuries noted.     Malnutrition Risk: Pt with severe, chronic malnutrition related to hx renal cell cancer with subsequent nephrectomy, and new findings of metastatic disease, AEB severe " wt loss of 9% in two months and <75% of estimated energy needs > 1 month.     Recommendations/Plan:  Diet advancement per MD.  Monitor weight.    RD following.

## 2023-11-12 NOTE — PROGRESS NOTES
Report received @2030. Patient arrived on floor @2300. Assumed care of patient. Assessment performed. 2 RN skin check received. Patient reports no dyspnea, no numbness or tingling, and pain @2/10. Sacrum red and very slow to alesia.    NG tube placed as per order by charge ALEJANDRA Dunlap. Gastric fluids immediately returned. Tube placed to low intermittent suction.      0145 radiology called to confirm that NG tube was not placed appropriately and appears to be looped in esophagus. Will reposition and order stat xray.     0230 NG tube removed and replaced in R nataly by Saniya ROBERTS with successful placement as per STAT xray confirmation. Tube returned to low intermittent suction. Patient tolerating NG tube well.

## 2023-11-12 NOTE — ASSESSMENT & PLAN NOTE
Diet per Surgery - Clear liquids for comfort  Stop TPN  IR - successful percutaneous placement of 18-Egyptian gastrostomy tube in the antrum of the stomach   11/21/2023  Comfort care

## 2023-11-12 NOTE — CARE PLAN
The patient is Watcher - Medium risk of patient condition declining or worsening    Shift Goals  Clinical Goals: Pain Control, Nausea control  Patient Goals: Rest, Pain Control  Family Goals: LUZ    Progress made toward(s) clinical / shift goals:    Problem: Communication  Goal: The ability to communicate needs accurately and effectively will improve  Outcome: Progressing  Note: Plan of Care discussed, all questions answered. Pt effectively communicates needs to staff.      Problem: Nutrition  Goal: Patient's nutritional and fluid intake will be adequate or improve  Outcome: Progressing  Flowsheets (Taken 11/12/2023 1130)  Oral Nutrition Supplement: NPO  Note: Pt NPO with NGT. IVF @ 100 ml/hr.        Patient is not progressing towards the following goals:

## 2023-11-12 NOTE — H&P
Hospital Medicine History & Physical Note    Date of Service  11/11/2023    Primary Care Physician  Greg Grande M.D.    Consultants  general surgery    Specialist Names:     Code Status  Full Code    Chief Complaint      History of Presenting Illness  Nghia Coffman Jr is a 63 y.o. male who presented 11/11/2023 with past medical history of metastatic renal cell carcinoma who comes into the hospital for abdominal distention for the past 4 days.  His last bowel movement was also 4 days ago.  He is currently not passing gas.  He has had repeated episodes of vomiting.  3 weeks ago patient had C. difficile where he was taking oral vancomycin and has only 3 to 4 days left.  In August he had a left-sided nephrectomy and patient states since then his bowels has not been working correctly.  Patient had only 1 dose of Keytruda and is currently not on chemotherapy.    Patient was transferred from [facility] where he had a CT scan that found metastatic liver disease, ascites and a mechanical small bowel obstruction with the transition in the distal ileum central upper pelvis.    WBC 11.2, hemoglobin 8.6, platelets 601, lipase 51, creatinine 1.2.    I discussed the plan of care with patient.    Review of Systems  Review of Systems   Constitutional:  Negative for chills, diaphoresis, fever and malaise/fatigue.   HENT:  Negative for congestion, ear discharge, ear pain, hearing loss, nosebleeds, sinus pain, sore throat and tinnitus.    Eyes:  Negative for blurred vision, double vision, photophobia and pain.   Respiratory:  Negative for cough, hemoptysis, sputum production, shortness of breath, wheezing and stridor.    Cardiovascular:  Negative for chest pain, palpitations, orthopnea, claudication, leg swelling and PND.   Gastrointestinal:  Positive for abdominal pain, constipation, nausea and vomiting. Negative for blood in stool, diarrhea, heartburn and melena.   Genitourinary:  Negative for dysuria,  flank pain, frequency, hematuria and urgency.   Musculoskeletal:  Negative for back pain, falls, joint pain, myalgias and neck pain.   Skin:  Negative for itching and rash.   Neurological:  Negative for dizziness, tingling, tremors, weakness and headaches.   Endo/Heme/Allergies:  Negative for environmental allergies and polydipsia. Does not bruise/bleed easily.   Psychiatric/Behavioral:  Negative for depression, hallucinations, substance abuse and suicidal ideas.        Past Medical History   has a past medical history of Anesthesia (09/19/2023), Bowel habit changes (09/19/2023), Breath shortness (09/19/2023), Cancer (HCC) (09/19/2023), Delayed emergence from general anesthesia, Disorder of thyroid, Pain (09/19/2023), Pneumonia (2000), Renal disorder (09/19/2023), and Sleep apnea (09/19/2023).    Surgical History   has a past surgical history that includes thyroid lobectomy (2018); inguinal hernia repair (Left, 2018); sinuscopy (1988); pr remv kidney,radical (08/02/2023); and biopsy, pancreas (08/02/2023).     Family History  family history is not on file.   Family history reviewed with patient. There is no family history that is pertinent to the chief complaint.     Social History   reports that he has never smoked. He has never used smokeless tobacco. He reports that he does not currently use alcohol. He reports that he does not use drugs.    Allergies  Allergies   Allergen Reactions    Seasonal Runny Nose     Itchy eyes       Medications  Prior to Admission Medications   Prescriptions Last Dose Informant Patient Reported? Taking?   Polyethylene Glycol 3350 (MIRALAX PO)  Patient Yes No   Sig: Take  by mouth 2 times a day.   Sennosides-Docusate Sodium (STOOL SOFTENER/LAXATIVE PO)  Patient Yes No   Sig: Take  by mouth every day.   acetaminophen (TYLENOL) 325 MG Tab  Patient No No   Sig: Take 2 Tablets by mouth every four hours as needed for Mild Pain or Moderate Pain.   naproxen (ANAPROX) 220 MG tablet  Patient Yes  No   Sig: Take 220 mg by mouth as needed.   omeprazole (PRILOSEC) 20 MG delayed-release capsule  Patient No No   Sig: Take 1 Capsule by mouth every day.   ondansetron (ZOFRAN ODT) 4 MG TABLET DISPERSIBLE  Patient No No   Sig: Take 1 Tablet by mouth every 6 hours as needed for Nausea/Vomiting.   oxyCODONE immediate-release (ROXICODONE) 5 MG Tab  Patient Yes No   Sig: Take 5 mg by mouth every four hours as needed for Severe Pain.   prochlorperazine (COMPAZINE) 10 MG Tab  Patient No No   Sig: Take 1 Tablet by mouth every 6 hours as needed for Nausea/Vomiting (2nd line nausea).      Facility-Administered Medications: None       Physical Exam                             Physical Exam  Vitals and nursing note reviewed.   Constitutional:       General: He is not in acute distress.     Appearance: Normal appearance. He is not ill-appearing, toxic-appearing or diaphoretic.   HENT:      Head: Normocephalic and atraumatic.      Nose: No congestion or rhinorrhea.      Mouth/Throat:      Pharynx: No posterior oropharyngeal erythema.   Eyes:      General: No scleral icterus.        Right eye: No discharge.   Cardiovascular:      Rate and Rhythm: Normal rate and regular rhythm.      Pulses: Normal pulses.      Heart sounds: Normal heart sounds. No murmur heard.     No friction rub. No gallop.   Pulmonary:      Effort: Pulmonary effort is normal. No respiratory distress.      Breath sounds: Normal breath sounds. No stridor. No wheezing, rhonchi or rales.   Abdominal:      General: There is distension.      Tenderness: There is no abdominal tenderness.   Musculoskeletal:         General: No swelling, tenderness, deformity or signs of injury.      Cervical back: Normal range of motion.      Right lower leg: No edema.      Left lower leg: No edema.   Skin:     Capillary Refill: Capillary refill takes more than 3 seconds.      Coloration: Skin is not jaundiced or pale.      Findings: No bruising, erythema, lesion or rash.  "  Neurological:      General: No focal deficit present.      Mental Status: He is alert and oriented to person, place, and time.         Laboratory:          No results for input(s): \"ALTSGPT\", \"ASTSGOT\", \"ALKPHOSPHAT\", \"TBILIRUBIN\", \"DBILIRUBIN\", \"GAMMAGT\", \"AMYLASE\", \"LIPASE\", \"ALB\", \"PREALBUMIN\", \"GLUCOSE\" in the last 72 hours.      No results for input(s): \"NTPROBNP\" in the last 72 hours.      No results for input(s): \"TROPONINT\" in the last 72 hours.    Imaging:  OUTSIDE IMAGES-CT ABDOMEN /PELVIS   Final Result          no X-Ray or EKG requiring interpretation    Assessment/Plan:  Justification for Admission Status  I anticipate this patient will require at least two midnights for appropriate medical management, necessitating inpatient admission because small bowel obstruction    Patient will need a Med/Surg bed on SURGICAL service .  The need is secondary to small bowel obstruction.    * SBO (small bowel obstruction) (HCC)- (present on admission)  Assessment & Plan  Mechanical both small bowel obstruction  Recent C. difficile colitis  Keep n.p.o.  NG tube  IV hydration  Pain control  Surgery has been consulted    C. difficile colitis  Assessment & Plan  Continue oral vancomycin  Start IV Flagyl    Anemia  Assessment & Plan  Iron panel and reticulocyte count    Severe protein-calorie malnutrition (HCC)- (present on admission)  Assessment & Plan  Start IV thiamine  Monitor electrolytes    Malignant neoplasm of left kidney (HCC)- (present on admission)  Assessment & Plan  History of nephrectomy in August  Metastatic to lungs, liver, lymph nodes  Palliative consult        VTE prophylaxis: SCDs/TEDs        I discussed advance care planning for at least 30 minutes with the patient , including diagnosis, prognosis, plan of care, risks and benefits of any therapies that could be offered, as well as alternatives including palliation and hospice, as appropriate. The patient has opted Full code. Time spent is exclusive " of evaluation and management or other separately billable procedures.

## 2023-11-12 NOTE — PROGRESS NOTES
4 Eyes Skin Assessment Completed by ALJEANDRA Cooper and ALEJANDRA Carr.    Head WDL  Ears WDL  Nose WDL  Mouth WDL  Neck WDL  Breast/Chest WDL  Shoulder Blades Redness and Blanching  Spine WDL  (R) Arm/Elbow/Hand WDL  (L) Arm/Elbow/Hand WDL  Abdomen Scar; distention   Groin WDL  Scrotum/Coccyx/Buttocks Redness and Non-Blanching  (R) Leg WDL  (L) Leg WDL  (R) Heel/Foot/Toe Patient reports fungus of toenails  (L) Heel/Foot/Toe Patient reports fungus of toenails          Devices In Places Blood Pressure Cuff, Pulse Ox, and Juarez      Interventions In Place Pillows    Possible Skin Injury Yes    Pictures Uploaded Into Epic No, needs to be completed  Wound Consult Placed Yes  RN Wound Prevention Protocol Ordered Yes4 Eyes Skin

## 2023-11-12 NOTE — PROGRESS NOTES
Bedside report recieved, assumed care at 0700.   Pt is A&Ox4, denies pain at this time. Denies N/V at this time. BM PTA. NGT low intermittent suction R nare, +output. Juarez in place.  Room air.     Plan of care discussed. All needs met at this time.   Pt instructed to use call light when in need of assistance. Bed locked and in low position, call light and belongings within reach, upper rails up. Waffle overlay placed.

## 2023-11-13 PROBLEM — A49.8 RECURRENT CLOSTRIDIOIDES DIFFICILE INFECTION: Status: ACTIVE | Noted: 2023-01-01

## 2023-11-13 NOTE — PROGRESS NOTES
Received report of patient at start of shift. Patient is AOx4, PRN morphine administered for complaints of pain. Assessment complete. NG tube to low intermittent suction. Asked Pat ARTHUR, if patient is to be strict NPO or NPO with oral meds allowed, received clarification that patient is to remain strict NPO. Patient resting in bed throughout shift, encouraged to notify staff for any needs/assistance. Call light within reach.

## 2023-11-13 NOTE — CONSULTS
MRN: [id number]  Date of palliative consult: November 12  Reason for consult: Advance care planning  Referring provider: Dayami  Location of consult: [facility] 403 bed 2  Additional consulting services: Dietary, wound team, general surgery.    HPI:   Nghia Coffman Jr is a 63 y.o. male with a past medical history of renal cell carcinoma presented from [facility] where CT scan revealed metastatic liver, ascites, and mechanical small bowel obstruction with transition in the distal ileum central upper pelvis.  WBC 11.2, hemoglobin 8.6, platelets 600 lipase 51, creatinine 1.2.  On November 11 complaining of abdominal distention x4 days with constipation, last BM 4 days ago.  Patient denies passing flatus repeatedly vomiting.  He states recent history of C. difficile approximately 3 weeks ago, has been taking oral vancomycin and has 3 to 4 days of prescription left.  Recent history of left-sided nephrectomy in August of this year.  He is status post 1 dose of Keytruda no current other chemotherapy.    Significant/pertinent past medical history: Shortness of breath, thyroid disorder, pain, pneumonia, sleep apnea.  Never smoker, no current alcohol or drug use.    Pain History:  Onset: After nephrectomy   Location: Back, lower and upper; abdominal  Duration: Intermittent   Characteristics: Achy  Aggravating factors: Position, no pain medication  Alleviating factors: Pain medication   Radiation: As above  Treatments: Medication  Severity: 10 out of 10 at its worst    Additional symptoms: Nausea, vomiting, anorexia, 40 pound weight loss in the past months, insomnia.      Interval History: General surgery was consulted and has recommended NG tube and nonoperative management.    Medication Allergy/Sensitivities:  Allergies   Allergen Reactions    Grass Pollen(K-O-R-T-Swt Addison) Runny Nose          Seasonal Runny Nose     Itchy eyes    Juniper Tar Unspecified       ROS:    Review of Systems   Constitutional:   Positive for malaise/fatigue and weight loss. Negative for fever.   HENT:  Negative for sore throat.    Respiratory:  Negative for shortness of breath.    Cardiovascular:  Negative for leg swelling.   Gastrointestinal:  Positive for abdominal pain, constipation, diarrhea, nausea and vomiting.   Musculoskeletal:  Positive for back pain.   Neurological:  Positive for weakness.   Psychiatric/Behavioral:  Positive for depression.        PE:   Recent vital signs  BMI: Body mass index is 20.48 kg/m².    Temp (24hrs), Av.7 °C (98.1 °F), Min:36.6 °C (97.9 °F), Max:37 °C (98.6 °F)  Temperature: 36.6 °C (97.9 °F)  Pulse  Av.2  Min: 95  Max: 104   Blood Pressure: 125/82       Physical Exam  Vitals and nursing note reviewed.   Constitutional:       General: He is not in acute distress.     Appearance: He is cachectic. He is ill-appearing. He is not toxic-appearing.   Cardiovascular:      Rate and Rhythm: Tachycardia present.      Heart sounds: Murmur heard.   Pulmonary:      Effort: Pulmonary effort is normal.      Breath sounds: Normal breath sounds.   Abdominal:      General: Abdomen is protuberant. Bowel sounds are absent. There is distension.   Musculoskeletal:      Right lower leg: No edema.      Left lower leg: No edema.   Skin:     General: Skin is warm and dry.      Capillary Refill: Capillary refill takes less than 2 seconds.      Coloration: Skin is pale.   Neurological:      Mental Status: He is alert and oriented to person, place, and time.   Psychiatric:         Attention and Perception: Attention normal.         Mood and Affect: Mood and affect normal.         Speech: Speech normal.         Behavior: Behavior normal.         Thought Content: Thought content normal.         Cognition and Memory: Cognition normal.         Judgment: Judgment normal.       Recent Labs     23  0011 23  0807   SODIUM 133* 132*   POTASSIUM 4.5 4.7   CHLORIDE 96 96   CO2 25 23   GLUCOSE 101* 95   BUN 15 14  "  CREATININE 1.06 0.93   CALCIUM 9.2 8.3*     Recent Labs     11/12/23  0011 11/13/23  0807   WBC 10.0 8.6   RBC 2.89* 2.74*   HEMOGLOBIN 7.5* 7.2*   HEMATOCRIT 25.5* 24.1*   MCV 88.2 88.0   MCH 26.0* 26.3*   MCHC 29.4* 29.9*   RDW 50.8* 50.6*   PLATELETCT 471* 441   MPV 9.4 9.4       ASSESSMENT/PLAN WITH SHARED DECISION MAKING:   Review  Pertinent imaging reviewed.    PHYSICAL ASPECTS OF CARE  Palliative Performance Scale: 40%    #Small bowel obstruction  #C. difficile colitis  #Anemia  #Severe protein calorie malnutrition  #Malignant neoplasm of left kidney with metastatic lesions  #Frailty  #Pain  - IV morphine working well for now.  - We will continue to follow.      SOCIAL ASPECTS OF CARE  Angelo resides with his wife of 37 years, Yusra, in [place], [place].  He has been a  in [place]  38 years approximately.  He enjoys being outdoors hunting or camping.  They have 2 children who are grown.    SPIRITUAL ASPECTS OF CARE   Angelo identifies as a [employer] and he believes in God.  No spiritual needs to be addressed at this time.    GOALS OF CARE/SERIOUS ILLNESS CONVERSATION  Met with patient and his wife Yusra as well as daughter Minal at bedside.  Introduced myself and role of palliative care, all agreeable to discussion.  Patient reports he has been feeling ill since mid July but really much longer than this.  He states he has been to multiple doctor visits complaining of increasing fatigue over the past several years.  He has not been able to work since mid July.  [facility] and was due for another Keytruda dose this week.  He reports he took an oral medication directed at his renal cell carcinoma developed C. difficile colitis shortly thereafter.  He feels as though it has been \"1 thing after another\".  He and his family have a very good understanding of his current medical condition.  Angelo is hoping for the cancer to be gone and for him to be in complete " "remission.  He is hopeful that he will return home and be able to carry on with his life.  He does not express fear of dying stating  that \"he believes in God and hopes that he will be going to a better place\".    I explored possibility of patient having discussed worsening health status with his wife or family and what his wishes might be.  He reports they have never discussed such a scenario but are willing to talk this through in the coming days.  In addition, I offered assistance with advanced directive should they be interested in completing this document while here in the hospital.  CODE STATUS was not addressed today as patient is likely to undergo surgery at some point.  PC APRN will continue to follow as clinical condition unfolds.  Plan to have ongoing goals of care discussion including CODE STATUS conversation..    Code Status: Full    ACP Documents: None    0 minutes spent discussing advance care planning, this time excludes any other billed services.    I spent a total of 60 minutes reviewing medical records, direct face-to-face time with the patient and/or family, documentation and coordination of care. This is separate from the time spent on advance care planning, which is documented above.    Glo Villagomez, MSN, APRN, ACNPC-AG.  Palliative Care Nurse Practitioner  [phone]      "

## 2023-11-13 NOTE — PROGRESS NOTES
Assumed care of patient and received report from Torie ROBERTS. Assessment completed.Pt A&Ox 4. Respirations are even and unlabored on room air. Pt reports pain in his abdomen. Medication per MAR. Medical pt, call light and belongings are within reach. POC updated. Pt educated on room and call light, pt verbalized understanding. Needs met.

## 2023-11-13 NOTE — PROGRESS NOTES
"    DATE: 11/13/2023    Hospital Day 2  small bowel obstruction    INTERVAL EVENTS:  Abdomen remains distended and tympanic  Large volume output from NG tube    - Continue NG to suction  - Consider small bowel follow through if symptoms don't improve     REVIEW OF SYSTEMS:  Review of Systems   Constitutional:  Negative for chills and fever.   Gastrointestinal:  Negative for abdominal pain, nausea and vomiting.   Musculoskeletal:  Negative for myalgias.       PHYSICAL EXAMINATION:  Vital Signs: Blood Pressure 125/82   Pulse 95   Temperature 36.6 °C (97.9 °F) (Temporal)   Respiration 17   Height 1.854 m (6' 1\")   Weight 70.4 kg (155 lb 3.3 oz)   Oxygen Saturation 92%   Physical Exam  Vitals and nursing note reviewed.   Constitutional:       Appearance: He is not toxic-appearing.   HENT:      Nose:      Comments: Nasoenteric tube in place with greenish output  Cardiovascular:      Pulses: Normal pulses.   Pulmonary:      Effort: Pulmonary effort is normal.   Abdominal:      General: There is distension.      Palpations: Abdomen is soft.      Tenderness: There is no abdominal tenderness.   Neurological:      Mental Status: He is alert and oriented to person, place, and time.         LABORATORY VALUES:   Recent Labs     11/12/23 0011 11/13/23  0807   WBC 10.0 8.6   RBC 2.89* 2.74*   HEMOGLOBIN 7.5* 7.2*   HEMATOCRIT 25.5* 24.1*   MCV 88.2 88.0   MCH 26.0* 26.3*   MCHC 29.4* 29.9*   RDW 50.8* 50.6*   PLATELETCT 471* 441   MPV 9.4 9.4     Recent Labs     11/12/23 0011 11/13/23  0807   SODIUM 133* 132*   POTASSIUM 4.5 4.7   CHLORIDE 96 96   CO2 25 23   GLUCOSE 101* 95   BUN 15 14   CREATININE 1.06 0.93   CALCIUM 9.2 8.3*     Recent Labs     11/12/23  0011 11/13/23  0807   ASTSGOT 13 27   ALTSGPT 7 10   TBILIRUBIN 0.4 0.3   ALKPHOSPHAT 168* 142*   GLOBULIN 3.8* 3.3   INR 1.29*  --    AMMONIA 10*  --      Recent Labs     11/12/23 0011   APTT 32.9   INR 1.29*        IMAGING:   DX-ABDOMEN FOR TUBE PLACEMENT   Final " Result      1.  Enteric tube has been adjusted and now projects over the gastric body in satisfactory position.      DX-ABDOMEN FOR TUBE PLACEMENT   Final Result      1.  Presumed enteric tube projects over the upper mid thorax likely in the thoracic esophagus above the level of the aster. It is  recommended this be removed and reinserted or readjusted.      2.  Findings were originally called to the patient's nurse on 11/12/2023 at 1:23 AM however she was unavailable due to assisting another patient. Findings were discussed with the patient's nurse Haja, 1:45 AM on 11/12/2023.      OUTSIDE IMAGES-CT ABDOMEN /PELVIS   Final Result          Core Measures & Quality Metrics    ASSESSMENT AND PLAN:   * SBO (small bowel obstruction) (HCC)- (present on admission)  Assessment & Plan  4 day history of inc distention and no BM  Recent hx of L nephrectomy with metastatic renal cell  CT shows SBO and retroperitoneal mass/collection  NGT to suction   Non op management        Discussed patient condition with RN, Patient, and general surgery, Dr. Montanez.

## 2023-11-13 NOTE — PROGRESS NOTES
"Med rec completed per patient's spouse at bedside.  Allergies reviewed with spouse.  Patient fills at [facility] in [place], [place] and with [facility] on [facility].    Outpatient antibiotics within the last 30 days: Patient is currently on a 14 day course of vancomycin. Spouse unable to recall exact start date, and unable to verify dispense date with patient's home pharmacy, [facility], as the pharmacy is closed at this time, however, spouse does state that patient has been on this medication for over a week and had \"a couple days\" remaining in course.    ANTICOAGULATION: NONE.    Chico Rivera, PhT  "

## 2023-11-13 NOTE — CARE PLAN
The patient is Stable - Low risk of patient condition declining or worsening    Shift Goals  Clinical Goals: pain control  Patient Goals: pain control, rest  Family Goals: pain control, comfort      Problem: Knowledge Deficit - Standard  Goal: Patient and family/care givers will demonstrate understanding of plan of care, disease process/condition, diagnostic tests and medications  Outcome: Progressing  Note: Patient educated on plan and goals of care and disease process. Education provided on medications, procedures, and equipment. Will continue to re-enforce when required. All questions and concerns answered at this time.     Problem: Fall Risk  Goal: Patient will remain free from falls  Outcome: Progressing  Note: Bed in lowest and locked position, call light is within reach, bed alarm is on, treaded socks in place. Patient oriented to room, POC and educated to use call light for assistance. Patient educated to not get out of bed without staff present.      Problem: Pain - Standard  Goal: Alleviation of pain or a reduction in pain to the patient’s comfort goal  Outcome: Progressing  Note: Educated patient on the use of 0-10 pain scale and use of pain descriptors. Administered pain medication when needed per MAR. Non-pharmacological for pain control such as rest, repositioning, and enforcing a calm and conductive environment.

## 2023-11-14 NOTE — PROGRESS NOTES
"    DATE: 11/14/2023    Hospital Day 3  small bowel obstruction    INTERVAL EVENTS:  No change in abdominal exam, ongoing distension  Denies nausea  900ml / 24 hour output from NG tube    REVIEW OF SYSTEMS:  Review of Systems   Constitutional:  Negative for chills and fever.   Gastrointestinal:  Negative for abdominal pain, nausea and vomiting.   Musculoskeletal:  Negative for myalgias.       PHYSICAL EXAMINATION:  Vital Signs: Blood Pressure 118/74   Pulse 100   Temperature 37.2 °C (99 °F) (Temporal)   Respiration 18   Height 1.854 m (6' 1\")   Weight 70.4 kg (155 lb 3.3 oz)   Oxygen Saturation 96%   Physical Exam  Vitals and nursing note reviewed.   Constitutional:       Appearance: He is not toxic-appearing.   HENT:      Nose:      Comments: Nasoenteric tube in place with greenish output   Cardiovascular:      Pulses: Normal pulses.   Pulmonary:      Effort: Pulmonary effort is normal.   Abdominal:      General: There is distension.      Palpations: Abdomen is soft.      Tenderness: There is no abdominal tenderness.   Neurological:      Mental Status: He is alert and oriented to person, place, and time.         LABORATORY VALUES:   Recent Labs     11/12/23 0011 11/13/23  0807 11/14/23  0751   WBC 10.0 8.6 9.9   RBC 2.89* 2.74* 2.80*   HEMOGLOBIN 7.5* 7.2* 7.2*   HEMATOCRIT 25.5* 24.1* 24.4*   MCV 88.2 88.0 87.1   MCH 26.0* 26.3* 25.7*   MCHC 29.4* 29.9* 29.5*   RDW 50.8* 50.6* 50.4*   PLATELETCT 471* 441 416   MPV 9.4 9.4 9.2     Recent Labs     11/12/23  0011 11/13/23  0807 11/14/23  0751   SODIUM 133* 132* 134*   POTASSIUM 4.5 4.7 4.1   CHLORIDE 96 96 96   CO2 25 23 24   GLUCOSE 101* 95 90   BUN 15 14 12   CREATININE 1.06 0.93 0.85   CALCIUM 9.2 8.3* 8.0*     Recent Labs     11/12/23  0011 11/13/23  0807 11/14/23  0751   ASTSGOT 13 27 12   ALTSGPT 7 10 6   TBILIRUBIN 0.4 0.3 0.3   ALKPHOSPHAT 168* 142* 133*   GLOBULIN 3.8* 3.3 3.0   INR 1.29*  --   --    AMMONIA 10*  --   --      Recent Labs     " 11/12/23  0011   APTT 32.9   INR 1.29*        IMAGING:   DX-ABDOMEN FOR TUBE PLACEMENT   Final Result      1.  Enteric tube has been adjusted and now projects over the gastric body in satisfactory position.      DX-ABDOMEN FOR TUBE PLACEMENT   Final Result      1.  Presumed enteric tube projects over the upper mid thorax likely in the thoracic esophagus above the level of the aster. It is  recommended this be removed and reinserted or readjusted.      2.  Findings were originally called to the patient's nurse on 11/12/2023 at 1:23 AM however she was unavailable due to assisting another patient. Findings were discussed with the patient's nurse Haja, 1:45 AM on 11/12/2023.      OUTSIDE IMAGES-CT ABDOMEN /PELVIS   Final Result      DX-UPPER GI-SMALL BOWEL FOLLOW THRU    (Results Pending)       Core Measures & Quality Metrics    ASSESSMENT AND PLAN:   * SBO (small bowel obstruction) (HCC)- (present on admission)  Assessment & Plan  4 day history of inc distention and no BM  Recent hx of L nephrectomy with metastatic renal cell  CT shows SBO and retroperitoneal mass/collection  NGT to suction   11/14 Continued high volume output from NG tube, ongoing distension  - Small bowel follow through ordered        Discussed patient condition with RN, Patient, and general surgery, Dr. Montanez.

## 2023-11-14 NOTE — PROGRESS NOTES
Assumed care of patient at 0645. Bedside report received. Assessment complete.  AA&Ox4. Denies CP/SOB.  Reporting 7/10 pain. Pain Medicated per MAR   Educated patient regarding pharmacologic and non pharmacologic modalities for pain management.  Skin per flowsheets  Strict NPO. Denies N/V.  L Nare NG to Low Continuous Wall Suction   + void via Juarze Catheter. + BM. Last BM 11/14  Pt ambulates SBA w FWW  All needs met at this time. Call light within reach. Pt calls appropriately. Bed low and locked, non skid socks in place. Hourly rounding in place.

## 2023-11-14 NOTE — CARE PLAN
The patient is Stable - Low risk of patient condition declining or worsening    Shift Goals  Clinical Goals: Pain management, bowel rest  Patient Goals: pain control, rest    Progress made toward(s) clinical / shift goals:  PRN Morphine administered for complaints of pain. NG tube to low intermittent suction, outputs recorded Q4h. Strict NPO status continued.     Patient is not progressing towards the following goals: N/A

## 2023-11-14 NOTE — DISCHARGE PLANNING
"Care Transition Team Assessment    This RN CM use chart review for this assessment .   \"a 63 y.o. male who presented 11/11/2023 with past medical history of metastatic renal cell carcinoma who comes into the hospital for abdominal distention for the past 4 days.\"    Pt lives with Spouse in a one story house in [facility].Pt also has /Adult children for support.    Pt owns  a FWW . Pt is independent with most ADLs and IADLs prior to this hospitalization.    Pt has  a PCP, Dr Greg Grande . Pt has [employer] for Insurance.     Plan is to discharge Pt to his home or his Daughter's home.      Information Source  Orientation Level: Oriented X4  Information Given By: Other (Comments)  Who is responsible for making decisions for patient? : Patient    Readmission Evaluation  Is this a readmission?: No    Elopement Risk  Legal Hold: No  Ambulatory or Self Mobile in Wheelchair: Yes  Disoriented: No  Psychiatric Symptoms: None  History of Wandering: No  Elopement this Admit: No  Vocalizing Wanting to Leave: No  Displays Behaviors, Body Language Wanting to Leave: No-Not at Risk for Elopement  Elopement Risk: Not at Risk for Elopement    Interdisciplinary Discharge Planning  Lives with - Patient's Self Care Capacity:  Spouse  Patient or legal guardian wants to designate a caregiver: No  Support Systems:  Children, Family Member(s), Friends / Neighbors, Spouse / Significant Other  Housing / Facility:  1 Story House  Patient Prefers to be Discharged to:  Home or to daughter's house  Durable Medical Equipment: Walker    Discharge Preparedness  What is your plan after discharge?: Home with help  What are your discharge supports?: Spouse/  Adult Children  Prior Functional Level: Ambulatory    Functional Assesment  Prior Functional Level: Ambulatory    Finances  Financial Barriers to Discharge: No  Prescription Coverage: Yes    Vision / Hearing Impairment  Vision Impairment : Yes  Right Eye Vision: Impaired, Wears Glasses  Left Eye " Vision: Impaired, Wears Glasses  Hearing Impairment : No         Advance Directive  Advance Directive?: None    Domestic Abuse  Have you ever been the victim of abuse or violence?: No  Physical Abuse or Sexual Abuse: No  Verbal Abuse or Emotional Abuse: No  Possible Abuse/Neglect Reported to: Not Applicable    Psychological Assessment  History of Substance Abuse: None  History of Psychiatric Problems: No  Non-compliant with Treatment: No  Newly Diagnosed Illness: Yes    Discharge Risks or Barriers  Discharge risks or barriers?: Other (Pending medical clearance)  Anticipated Discharge Information  Discharge Disposition: Discharged to home/self care (01) with close OP follow up

## 2023-11-14 NOTE — PROGRESS NOTES
[facility] Medicine Daily Progress Note    Date of Service  11/14/2023    Chief Complaint  Nghia Coffman Jr is a 63 y.o. male admitted 11/11/2023 with small bowel obstruction.    Hospital Course  Nghia Coffman Jr is a 63 y.o. male who presented 11/11/2023 with past medical history of metastatic renal cell carcinoma who comes into the hospital for abdominal distention for the past 4 days.  His last bowel movement was also 4 days ago.  He is currently not passing gas.  He has had repeated episodes of vomiting.  3 weeks ago patient had C. difficile where he was taking oral vancomycin and has only 3 to 4 days left.  In August he had a left-sided nephrectomy and patient states since then his bowels has not been working correctly.  Patient had only 1 dose of Keytruda and is currently not on chemotherapy.     Patient was transferred from [facility] where he had a CT scan that found metastatic liver disease, ascites and a mechanical small bowel obstruction with the transition in the distal ileum central upper pelvis.     WBC 11.2, hemoglobin 8.6, platelets 601, lipase 51, creatinine 1.2.         Interval Problem Update  11/13/23  Patient continues to have NG tube in place with significant output.  Continue to receive rectal vancomycin.  Patient is status post left nephrectomy on 8/2/2023.  Sodium mildly low at 132.  Hemoglobin stable at 7.2.  White count stable at 8.6.    11/14 patient in bed, no fever or chills, family at bedside, passing small flatus, and some bm as per nurse staff, surgery notes reviewed, SBFT today. Continue vanco for c diff.     I have discussed this patient's plan of care and discharge plan at IDT rounds today with Case Management, Nursing, Nursing leadership, and other members of the IDT team.    Consultants/Specialty  general surgery    Code Status  Full Code    Disposition  The patient is not medically cleared for discharge to home or a post-acute facility.      I have  placed the appropriate orders for post-discharge needs.    Review of Systems  Review of Systems   Constitutional:  Negative for chills and fever.   Respiratory:  Negative for cough and shortness of breath.    Cardiovascular:  Negative for chest pain and palpitations.   Gastrointestinal:  Positive for nausea. Negative for abdominal pain and vomiting.   Genitourinary:  Negative for dysuria and hematuria.   Musculoskeletal:  Negative for joint pain and myalgias.   Neurological:  Negative for dizziness and headaches.        Physical Exam  Temp:  [36.6 °C (97.9 °F)-37.2 °C (99 °F)] 37.2 °C (99 °F)  Pulse:  [] 100  Resp:  [17-18] 18  BP: (111-122)/(73-79) 118/74  SpO2:  [93 %-96 %] 96 %    Physical Exam  Vitals and nursing note reviewed.   Constitutional:       General: He is not in acute distress.     Appearance: He is ill-appearing.   HENT:      Head: Normocephalic and atraumatic.      Nose:      Comments: Nasogastric tube in place     Mouth/Throat:      Mouth: Mucous membranes are moist.      Pharynx: Oropharynx is clear. No oropharyngeal exudate.   Eyes:      General: No scleral icterus.        Right eye: No discharge.         Left eye: No discharge.      Conjunctiva/sclera: Conjunctivae normal.   Cardiovascular:      Rate and Rhythm: Normal rate and regular rhythm.      Pulses: Normal pulses.      Heart sounds: Normal heart sounds. No murmur heard.  Pulmonary:      Effort: Pulmonary effort is normal. No respiratory distress.      Breath sounds: Normal breath sounds.   Abdominal:      General: Abdomen is flat. Bowel sounds are normal. There is distension.      Palpations: Abdomen is soft.      Tenderness: There is abdominal tenderness.   Musculoskeletal:         General: No swelling.      Cervical back: Neck supple. No tenderness.      Right lower leg: No edema.      Left lower leg: No edema.   Skin:     General: Skin is warm and dry.      Coloration: Skin is not pale.   Neurological:      Mental Status: He is  alert and oriented to person, place, and time. Mental status is at baseline.      Motor: No weakness.   Psychiatric:         Thought Content: Thought content normal.         Judgment: Judgment normal.         Fluids    Intake/Output Summary (Last 24 hours) at 11/14/2023 1144  Last data filed at 11/14/2023 0728  Gross per 24 hour   Intake --   Output 1400 ml   Net -1400 ml         Laboratory  Recent Labs     11/12/23  0011 11/13/23  0807 11/14/23  0751   WBC 10.0 8.6 9.9   RBC 2.89* 2.74* 2.80*   HEMOGLOBIN 7.5* 7.2* 7.2*   HEMATOCRIT 25.5* 24.1* 24.4*   MCV 88.2 88.0 87.1   MCH 26.0* 26.3* 25.7*   MCHC 29.4* 29.9* 29.5*   RDW 50.8* 50.6* 50.4*   PLATELETCT 471* 441 416   MPV 9.4 9.4 9.2       Recent Labs     11/12/23  0011 11/13/23  0807 11/14/23  0751   SODIUM 133* 132* 134*   POTASSIUM 4.5 4.7 4.1   CHLORIDE 96 96 96   CO2 25 23 24   GLUCOSE 101* 95 90   BUN 15 14 12   CREATININE 1.06 0.93 0.85   CALCIUM 9.2 8.3* 8.0*       Recent Labs     11/12/23  0011   APTT 32.9   INR 1.29*                 Imaging  DX-ABDOMEN FOR TUBE PLACEMENT   Final Result      1.  Enteric tube has been adjusted and now projects over the gastric body in satisfactory position.      DX-ABDOMEN FOR TUBE PLACEMENT   Final Result      1.  Presumed enteric tube projects over the upper mid thorax likely in the thoracic esophagus above the level of the aster. It is  recommended this be removed and reinserted or readjusted.      2.  Findings were originally called to the patient's nurse on 11/12/2023 at 1:23 AM however she was unavailable due to assisting another patient. Findings were discussed with the patient's nurse Haja, 1:45 AM on 11/12/2023.      OUTSIDE IMAGES-CT ABDOMEN /PELVIS   Final Result      DX-SMALL BOWEL SERIES    (Results Pending)        Assessment/Plan  * SBO (small bowel obstruction) (HCC)- (present on admission)  Assessment & Plan  Mechanical both small bowel obstruction  Recent C. difficile colitis  Keep n.p.o.  NG  tube  IV hydration  Pain control  Surgery has been consulted    11/13/23  Continue NG tube to suction  Strict n.p.o.  Continue IV fluids LR at 100 mils per hour    Recurrent Clostridioides difficile infection  Assessment & Plan  Continue oral vancomycin  Start IV Flagyl    11/13/23  Unable to take oral vancomycin  Continue vancomycin enemas and IV metronidazole    Continue vanco until 11/21    Normocytic anemia  Assessment & Plan  Iron panel and reticulocyte count    11/13/23  Likely anemia of chronic inflammatory state  Continue to treat underlying cause including C. difficile colitis    Severe protein-calorie malnutrition (HCC)- (present on admission)  Assessment & Plan  Start IV thiamine  Monitor electrolytes    Malignant neoplasm of left kidney (HCC)- (present on admission)  Assessment & Plan  History of nephrectomy in August  Metastatic to lungs, liver, lymph nodes  Palliative consult         VTE prophylaxis: heparin    I have performed a physical exam and reviewed and updated ROS and Plan today (11/14/2023). In review of yesterday's note (11/13/2023), there are no changes except as documented above.      Greater than 51 minutes spent prepping to see patient (e.g. reviewing  tests/imaging results, notes from consultants, bedside nurse, night shift ) obtaining and/or reviewing separately obtained history. Performing a medically appropriate examination and evaluation.  Counseling and educating the patient.  Ordering medications, tests, or procedures.  Referring and communicating with other health care professionals.  Documenting clinical information in EPIC.  Independently interpreting results and communicating results to patient.  Care coordination.    Discussed with general surgery Dr. Montanez, we discussed regarding imaging and small bowel follow-through results, discussed regarding PICC line and TPN, will get oncology involved in a.m., I also discussed with pharmacist and according to pharmacist patient has  completed treatment for C. difficile infection and patient also is not having diarrhea at this time, patient having no leukocytosis, for this will stop vancomycin since patient again has completed treatment for C. difficile.  Spent extra 15 minutes

## 2023-11-14 NOTE — CARE PLAN
Problem: Gastrointestinal Irritability  Goal: Nausea and vomiting will be absent or improve  Description: Target End Date:  Prior to discharge or change in level of care    Document on I/O and Assessment flowsheets    1.  Assess for history, duration, frequency, severity, and potential precipitating factors  2.  Administer antiemetics as ordered  3.  Emesis basin within easy reach of the patient, but out of sight if psychogenic component  4.  Eliminate strong odors from surroundings  5.  Introduce cold water, ice chips, albertina products, and room temperature broth or bouillon if tolerated and appropriate to the patient's diet  6.  Encourage small amounts of bland, simple foods like broth, rice, bananas, Jell-O, crackers or toast if tolerated and appropriate to patient's diet  7.  Position the patient upright while eating and for 1 to 2 hours post-meal  8.  Encourage nonpharmacological nausea control techniques such as relaxation, guided imagery, music therapy, distraction, or deep breathing exercises  Outcome: Progressing     Problem: Pain - Standard  Goal: Alleviation of pain or a reduction in pain to the patient’s comfort goal  Description: Target End Date:  Prior to discharge or change in level of care    Document on Vitals flowsheet    1.  Document pain using the appropriate pain scale per order or unit policy  2.  Educate and implement non-pharmacologic comfort measures (i.e. relaxation, distraction, massage, cold/heat therapy, etc.)  3.  Pain management medications as ordered  4.  Reassess pain after pain med administration per policy  5.  If opiods administered assess patient's response to pain medication is appropriate per POSS sedation scale  6.  Follow pain management plan developed in collaboration with patient and interdisciplinary team (including palliative care or pain specialists if applicable)  Outcome: Progressing     Problem: Knowledge Deficit - Standard  Goal: Patient and family/care givers will  demonstrate understanding of plan of care, disease process/condition, diagnostic tests and medications  Description: Target End Date:  1-3 days or as soon as patient condition allows    Document in Patient Education    1.  Patient and family/caregiver oriented to unit, equipment, visitation policy and means for communicating concern  2.  Complete/review Learning Assessment  3.  Assess knowledge level of disease process/condition, treatment plan, diagnostic tests and medications  4.  Explain disease process/condition, treatment plan, diagnostic tests and medications  Outcome: Progressing     The patient is Stable - Low risk of patient condition declining or worsening    Shift Goals  Clinical Goals: NGT, Bowel rest  Patient Goals: paiun control  Family Goals: pain control, comfort    Progress made toward(s) clinical / shift goals:  NGT in place to intermittent low suction. Pain controlled with PRN medication    Patient is not progressing towards the following goals:

## 2023-11-14 NOTE — PROGRESS NOTES
Hospital Medicine Daily Progress Note    Date of Service  11/13/2023    Chief Complaint  Nghia Coffman Jr is a 63 y.o. male admitted 11/11/2023 with small bowel obstruction.    Hospital Course  Nghia Coffman Jr is a 63 y.o. male who presented 11/11/2023 with past medical history of metastatic renal cell carcinoma who comes into the hospital for abdominal distention for the past 4 days.  His last bowel movement was also 4 days ago.  He is currently not passing gas.  He has had repeated episodes of vomiting.  3 weeks ago patient had C. difficile where he was taking oral vancomycin and has only 3 to 4 days left.  In August he had a left-sided nephrectomy and patient states since then his bowels has not been working correctly.  Patient had only 1 dose of Keytruda and is currently not on chemotherapy.     Patient was transferred from [facility] where he had a CT scan that found metastatic liver disease, ascites and a mechanical small bowel obstruction with the transition in the distal ileum central upper pelvis.     WBC 11.2, hemoglobin 8.6, platelets 601, lipase 51, creatinine 1.2.         Interval Problem Update  11/13/23  Patient continues to have NG tube in place with significant output.  Continue to receive rectal vancomycin.  Patient is status post left nephrectomy on 8/2/2023.  Sodium mildly low at 132.  Hemoglobin stable at 7.2.  White count stable at 8.6.    I have discussed this patient's plan of care and discharge plan at IDT rounds today with Case Management, Nursing, Nursing leadership, and other members of the IDT team.    Consultants/Specialty  general surgery    Code Status  Full Code    Disposition  The patient is not medically cleared for discharge to home or a post-acute facility.  Anticipate discharge to: home with close outpatient follow-up    I have placed the appropriate orders for post-discharge needs.    Review of Systems  Review of Systems   Constitutional:  Negative  for chills and fever.   Respiratory:  Negative for cough and shortness of breath.    Cardiovascular:  Negative for chest pain and palpitations.   Gastrointestinal:  Positive for nausea. Negative for abdominal pain and vomiting.   Genitourinary:  Negative for dysuria and hematuria.   Musculoskeletal:  Negative for joint pain and myalgias.   Neurological:  Negative for dizziness and headaches.        Physical Exam  Temp:  [36.6 °C (97.9 °F)-37.1 °C (98.8 °F)] 37.1 °C (98.8 °F)  Pulse:  [] 94  Resp:  [17] 17  BP: (108-125)/(62-82) 112/73  SpO2:  [91 %-95 %] 95 %    Physical Exam  Vitals and nursing note reviewed.   Constitutional:       General: He is not in acute distress.     Appearance: He is ill-appearing.   HENT:      Head: Normocephalic and atraumatic.      Nose:      Comments: Nasogastric tube in place     Mouth/Throat:      Mouth: Mucous membranes are moist.      Pharynx: Oropharynx is clear. No oropharyngeal exudate.   Eyes:      General: No scleral icterus.        Right eye: No discharge.         Left eye: No discharge.      Conjunctiva/sclera: Conjunctivae normal.   Cardiovascular:      Rate and Rhythm: Normal rate and regular rhythm.      Pulses: Normal pulses.      Heart sounds: Normal heart sounds. No murmur heard.  Pulmonary:      Effort: Pulmonary effort is normal. No respiratory distress.      Breath sounds: Normal breath sounds.   Abdominal:      General: Abdomen is flat. Bowel sounds are normal. There is distension.      Palpations: Abdomen is soft.      Tenderness: There is abdominal tenderness.   Musculoskeletal:         General: No swelling.      Cervical back: Neck supple. No tenderness.      Right lower leg: No edema.      Left lower leg: No edema.   Skin:     General: Skin is warm and dry.      Coloration: Skin is not pale.   Neurological:      Mental Status: He is alert and oriented to person, place, and time. Mental status is at baseline.      Motor: No weakness.   Psychiatric:          Thought Content: Thought content normal.         Judgment: Judgment normal.         Fluids    Intake/Output Summary (Last 24 hours) at 11/13/2023 1831  Last data filed at 11/13/2023 1600  Gross per 24 hour   Intake no documentation   Output 1600 ml   Net -1600 ml       Laboratory  Recent Labs     11/12/23  0011 11/13/23  0807   WBC 10.0 8.6   RBC 2.89* 2.74*   HEMOGLOBIN 7.5* 7.2*   HEMATOCRIT 25.5* 24.1*   MCV 88.2 88.0   MCH 26.0* 26.3*   MCHC 29.4* 29.9*   RDW 50.8* 50.6*   PLATELETCT 471* 441   MPV 9.4 9.4     Recent Labs     11/12/23  0011 11/13/23  0807   SODIUM 133* 132*   POTASSIUM 4.5 4.7   CHLORIDE 96 96   CO2 25 23   GLUCOSE 101* 95   BUN 15 14   CREATININE 1.06 0.93   CALCIUM 9.2 8.3*     Recent Labs     11/12/23  0011   APTT 32.9   INR 1.29*               Imaging  DX-ABDOMEN FOR TUBE PLACEMENT   Final Result      1.  Enteric tube has been adjusted and now projects over the gastric body in satisfactory position.      DX-ABDOMEN FOR TUBE PLACEMENT   Final Result      1.  Presumed enteric tube projects over the upper mid thorax likely in the thoracic esophagus above the level of the aster. It is  recommended this be removed and reinserted or readjusted.      2.  Findings were originally called to the patient's nurse on 11/12/2023 at 1:23 AM however she was unavailable due to assisting another patient. Findings were discussed with the patient's nurse Haja, 1:45 AM on 11/12/2023.      OUTSIDE IMAGES-CT ABDOMEN /PELVIS   Final Result           Assessment/Plan  * SBO (small bowel obstruction) ([facility])- (present on admission)  Assessment & Plan  Mechanical both small bowel obstruction  Recent C. difficile colitis  Keep n.p.o.  NG tube  IV hydration  Pain control  Surgery has been consulted    11/13/23  Continue NG tube to suction  Strict n.p.o.  Continue IV fluids LR at 100 mils per hour    Recurrent Clostridioides difficile infection  Assessment & Plan  Continue oral vancomycin  Start IV  Flagyl    11/13/23  Unable to take oral vancomycin  Continue vancomycin enemas and IV metronidazole    Normocytic anemia  Assessment & Plan  Iron panel and reticulocyte count    11/13/23  Likely anemia of chronic inflammatory state  Continue to treat underlying cause including C. difficile colitis    Severe protein-calorie malnutrition (HCC)- (present on admission)  Assessment & Plan  Start IV thiamine  Monitor electrolytes    Malignant neoplasm of left kidney (HCC)- (present on admission)  Assessment & Plan  History of nephrectomy in August  Metastatic to lungs, liver, lymph nodes  Palliative consult         VTE prophylaxis:   SCDs/TEDs   heparin ppx      I have performed a physical exam and reviewed and updated ROS and Plan today (11/13/2023). In review of yesterday's note (11/12/2023), there are no changes except as documented above.

## 2023-11-14 NOTE — THERAPY
Occupational Therapy Contact Note    Patient Name: Nghia Coffman Jr  Age:  63 y.o., Sex:  male  Medical Record #: [id number]  Today's Date: 11/14/2023    OT orders received and eval attempted. RN requested to hold eval as pt had recently vomited and has nausea. Will re-attempt OT eval when appropriate/able.

## 2023-11-14 NOTE — CARE PLAN
Problem: Knowledge Deficit - Standard  Goal: Patient and family/care givers will demonstrate understanding of plan of care, disease process/condition, diagnostic tests and medications  Outcome: Progressing     Problem: Fall Risk  Goal: Patient will remain free from falls  Outcome: Progressing     Problem: Pain - Standard  Goal: Alleviation of pain or a reduction in pain to the patient’s comfort goal  Outcome: Progressing   The patient is Stable - Low risk of patient condition declining or worsening    Shift Goals  Clinical Goals: Imaging  Patient Goals: Pain Control, Bowle Movement  Family Goals: pain control, comfort    Progress made toward(s) clinical / shift goals:  Educated patient on plan of care this shift, Pain medicated per MAR, Imaging Performed, x1 BM this shift    Patient is not progressing towards the following goals:

## 2023-11-14 NOTE — HOSPITAL COURSE
Nghia Coffman Jr is a 63 y.o. male who presented 11/11/2023 with past medical history of metastatic renal cell carcinoma who comes into the hospital for abdominal distention for the past 4 days.  His last bowel movement was also 4 days ago.  He is currently not passing gas.  He has had repeated episodes of vomiting.  3 weeks ago patient had C. difficile where he was taking oral vancomycin and has only 3 to 4 days left.  In August he had a left-sided nephrectomy and patient states since then his bowels has not been working correctly.  Patient had only 1 dose of Keytruda and is currently not on chemotherapy.     Patient was transferred from [facility] where he had a CT scan that found metastatic liver disease, ascites and a mechanical small bowel obstruction with the transition in the distal ileum central upper pelvis.     WBC 11.2, hemoglobin 8.6, platelets 601, lipase 51, creatinine 1.2.

## 2023-11-15 NOTE — THERAPY
Physical Therapy Contact Note    Patient Name: Nghia Coffman Jr  Age:  63 y.o., Sex:  male  Medical Record #: [id number]  Today's Date: 11/14/2023    PT consult received and chart reviewed. Pt having a small bowel follow through imaging series completed. Will follow up as able.     Chary Connors, PT, DPT

## 2023-11-15 NOTE — PROGRESS NOTES
"    No acute change  2 bowel movements after contrast yesterday but no bowel movement or flatus since  Pain persists    /68   Pulse (!) 109   Temp 36.8 °C (98.2 °F) (Temporal)   Resp 18   Ht 1.854 m (6' 1\") Comment: 's license  Wt 72.4 kg (159 lb 9.8 oz)   SpO2 91%   BMI 21.06 kg/m²     No distress  NGT bilious: 1200 cc / 24 hours  Abdomen remains firmly distended with intermittent bowel sounds    Recent Labs     11/13/23  0807 11/14/23  0751 11/15/23  0417   WBC 8.6 9.9 10.1   RBC 2.74* 2.80* 2.86*   HEMOGLOBIN 7.2* 7.2* 7.5*   HEMATOCRIT 24.1* 24.4* 24.4*   MCV 88.0 87.1 85.3   MCH 26.3* 25.7* 26.2*   RDW 50.6* 50.4* 49.3   PLATELETCT 441 416 431   MPV 9.4 9.2 9.2   NEUTSPOLYS 79.90* 81.50*  --    LYMPHOCYTES 9.50* 9.50*  --    MONOCYTES 8.80 7.60  --    EOSINOPHILS 0.80 0.60  --    BASOPHILS 0.30 0.30  --    RBCMORPHOLO  --  Normal  --        Recent Labs     11/13/23  0807 11/14/23  0751 11/15/23  0417   SODIUM 132* 134* 135   POTASSIUM 4.7 4.1 4.1   CHLORIDE 96 96 97   CO2 23 24 24   GLUCOSE 95 90 106*   BUN 14 12 11   CREATININE 0.93 0.85 0.80         Assessment and plan:  63-year-old male with large retroperitoneal tumor compressing stomach, duodenum, descending colon and small bowel who has mechanical small bowel obstruction.   By CT scan, there are likely multiple sites of partial obstruction.  Small bowel follow-through with significant transition point in the distal ileum though contrast does flow through the colon.      Discussed with surgical oncology on 11/14.  Based on patient's history, imaging and clinical picture, they would not recommend surgical intervention.  Recommend ongoing resuscitation, nasogastric decompression and TPN to see if this opens up over time considering patient is 14 weeks into a course of Keytruda.    PICC/TPN    Family is hopeful  Palliative care following  Awaiting input from oncology      "

## 2023-11-15 NOTE — DIETARY
"Nutrition Support Assessment:  Day 4 of admit.  Nghia Coffman Jr is a 63 y.o. male with admitting DX of SBO.     Current problem list:  Principal Problem:    SBO (small bowel obstruction) (HCC) (POA: Yes)  Active Problems:    Malignant neoplasm of left kidney (HCC) (POA: Yes)    Severe protein-calorie malnutrition (HCC) (POA: Yes)    Normocytic anemia (POA: Unknown)    Recurrent Clostridioides difficile infection (POA: Unknown)  Resolved Problems:    * No resolved hospital problems. *      RD collaborated with PharmD regarding the initiation of TPN and potential for refeeding. Pharmacist states electrolytes will be monitored and repleted PRN, IV phos prepared in specific solution to minimize any risks associated with this route of administration.     Assessment:  Estimated Nutritional Needs based on:   Height: 185.4 cm (6' 1\") ('s license)  Weight: 72.4 kg (159 lb 9.8 oz)  Body mass index is 21.06 kg/m². Classification: normal    Calculation/Equation: MSJ x 1.2-1.4 = 1890 - 2200  Total Calories / day: 1900 - 2200  ( Kcal / k - 30)  Total Grams Protein / day:  100 - 145  (Grams / k.5  - 2)     Evaluation:   Indication for nutrition support: small bowel obstruction, inability to tolerate EN/PO.  TPN access pending   Labs: reviewed, noted Phos 2.3   Medications: folic acid via IV, sodium phosphate via IV, thiamine via IV, Zofran (PRN), Phenergan (PRN)  LBM 23 per flowsheet   Skin: no wounds noted per flowsheet      Malnutrition Risk:  \"Pt with severe, chronic malnutrition related to hx renal cell cancer with subsequent nephrectomy, and new findings of metastatic disease, AEB severe wt loss of 9% in two months and <75% of estimated energy needs > 1 month\" . Per RD assessment on 23      Recommendations/Plan:  TPN per PharmD    Monitor for refeeding syndrome, advance rate slowly  Monitor lytes, replete PRN  Monitor wt    RD following            "

## 2023-11-15 NOTE — PROGRESS NOTES
Inpatient Palliative Care     Location: [place] [address]     HPI:   Nghia is seen lying in bed he reports feeling sleepy.  Poor pain control overnight.  His wife shares surgical perspective regarding mechanical obstruction/tumor.  She states he was encouraged to contact their family as prognosis was poor.  He is currently considering TPN.  He is anxious to see Dr. Pantoja with oncology.     Summary:  Patient remains hopeful that Keytruda infusion will assist resolution of mechanical bowel obstruction.  In addition, he is hopeful oncology will have some helpful therapy alternatives.  We discussed likelihood he is not eligible for further treatment until his bowel obstruction resolves.  As such, we discussed possibility of comfort focused treatment including hospice.  Patient lives in area where there is no outpatient hospice options.  His daughter, however, resides in [place].  We discussed availability of hospice representative to come and provide information to patient and family for future care planning.  Patient remains hopeful to be well enough to receive treatment but is receptive to discussion regarding hospice.    Private conversation with daughter in the hallway.  She indicates she would be agreeable to have patient and spouse stay in her home for hospice.  She is tearful and reports she has been attempting to have her parents come stay with her for quite some time.     Active listening, reflection, reminiscing, validation & normalization, and empathic support utilized throughout this encounter.  All questions answered and contact information provided, encouraged to call with any questions or concerns.      Plan:     1) PC APRN to continue to follow as clinical picture continues to unfold.  2) encouraged patient to stay ahead of pain, will provide some symptom management guidance if pain remains poorly controlled.  3) plan to discuss CODE STATUS.     Thank you for allowing me the opportunity to participate in  the care of Roberto Carlos LAYTON spent a total of 30 minutes reviewing medical records, direct face-to-face time with the patient and/or family, coordination of care, and documentation. This is separate from the time spent on advance care planning, which is documented above.     Glo Villagomez, MSN, APRN, ACNPC-AG.  Palliative Care Nurse Practitioner  [phone]

## 2023-11-15 NOTE — CARE PLAN
The patient is Stable - Low risk of patient condition declining or worsening    Shift Goals  Clinical Goals: Pain control  Patient Goals: pain control, rest  Family Goals: pain control, comfort    Progress made toward(s) clinical / shift goals:  Pain medication administered PRN, morphine not working during shift, pain medication changed to dilaudid. Patient rated lower pain and able to sleep intermittently    Patient is not progressing towards the following goals:

## 2023-11-15 NOTE — PROGRESS NOTES
Bedside report received from yasmany RN, assumed care at 1900.  Assessment complete.  A&O x 4. Patient calls appropriately.  Patient ambulates with standby assist with FWW. Bed alarm on.   Patient has 6/10 pain. Pain managed with prescribed medications.  Denies N&V. Strict NPO, R nare Ng to low continuous suction.  + void, + flatus, + BM.  Patient denies SOB. On room air.  Patient calm, pleasant, and cooperative.  Review plan with of care with patient. Call light and personal belongings within reach. Hourly rounding in place. All needs met at this time.

## 2023-11-15 NOTE — THERAPY
Occupational Therapy   Initial Evaluation     Patient Name: Nghia Coffman Jr  Age:  63 y.o., Sex:  male  Medical Record #: [id number]  Today's Date: 11/15/2023     Precautions: Fall Risk, Nasogastric Tube    Assessment  Patient is 63 y.o. male admitted with abdominal distension and diagnosed with SBO. Currently treating non-operatively. PMHx of metastatic renal cell carcinoma. Pt seen for OT eval with wife present. Pt currently resides in a 1-story house with his wife and was independent with ADLs and IADLs. During OT eval, pt primarily impacted by fatigue and decreased activity tolerance causing him to require CGA/mod A to complete ADLs, functional mobility, and txfs with FWW. Recommend home health for continued OT services after DC. Will continue to follow for ongoing acute OT services.     Plan    Occupational Therapy Initial Treatment Plan   Treatment Interventions: Self Care / Activities of Daily Living, Therapeutic Exercises, Therapeutic Activity  Treatment Frequency: 3 Times per Week  Duration: Until Therapy Goals Met    DC Equipment Recommendations: Unable to determine at this time  Discharge Recommendations: Recommend home health for continued occupational therapy services (With increased support from family)      Objective      Prior Living Situation   Prior Services Home-Independent   Housing / Facility 1 Story House   Steps Into Home 3   Steps In Home 0   Bathroom Set up Walk In Shower;Built-In Shower Chair;Shower Chair   Equipment Owned Front-Wheel Walker;Wheelchair;Tub / Shower Seat;Hand Held Shower   Lives with - Patient's Self Care Capacity Spouse   Comments Pt currently resides with his wife who is able to provide assist as needed. Pt's adult daughter lives local and can also provide assist as needed.   Prior Level of ADL Function   Self Feeding Independent   Grooming / Hygiene Independent   Bathing Independent   Dressing Independent   Toileting Independent   Prior Level of IADL Function    Medication Management Independent   Laundry Independent   Kitchen Mobility Independent   Finances Independent   Home Management Independent   Shopping Independent   Prior Level Of Mobility Independent With Device in Community;Independent With Device in Home   Driving / Transportation Relatives / Others Provide Transportation   Precautions   Precautions Fall Risk;Nasogastric Tube   Vitals   O2 Delivery Device None - Room Air   Pain 0 - 10 Group   Therapist Pain Assessment Post Activity Pain Same as Prior to Activity;Nurse Notified  (Not rated, agreeable to activity)   Non Verbal Descriptors   Non Verbal Scale  Calm;Unlabored Breathing   Cognition    Cognition / Consciousness WDL   Comments Pleasant and participatory during session.   Strength Upper Body   Upper Body Strength  X   Gross Strength Generalized Weakness, Equal Bilaterally.    Balance Assessment   Sitting Balance (Static) Fair +   Sitting Balance (Dynamic) Fair   Standing Balance (Static) Fair   Standing Balance (Dynamic) Fair -   Weight Shift Sitting Fair   Weight Shift Standing Fair   Comments w/FWW   Bed Mobility    Supine to Sit Contact Guard Assist   Sit to Supine   (Up to chair post)   Scooting Standby Assist   Rolling Standby Assist   Comments HOB elevated   ADL Assessment   Eating Total Assist   Grooming Supervision;Seated   Lower Body Dressing Moderate Assist   Toileting   (NT; declined need during session)   6 Clicks Daily Activity Score 11   Functional Mobility   Sit to Stand Contact Guard Assist   Bed, Chair, Wheelchair Transfer Contact Guard Assist   Toilet Transfers   (NT; declined need during session)   Mobility Functional mobility in room w/FWW   Activity Tolerance   Sitting in Chair Up to chair post   Sitting Edge of Bed 4 min   Standing 4 min   Comments Limited by fatigue   Patient / Family Goals   Patient / Family Goal #1 To go home   Short Term Goals   Short Term Goal # 1 Pt will complete LB dressing with supv   Short Term Goal # 2 Pt  will complete ADL txfs with supv   Short Term Goal # 3 Pt will complete UB dressing with supv   Education Group   Education Provided Role of Occupational Therapist   Role of Occupational Therapist Patient Response Patient;Acceptance;Explanation;Verbal Demonstration

## 2023-11-15 NOTE — PROGRESS NOTES
Pharmacy TPN Note for 11/15/2023           Pharmacy consulted for TPN management  Plan to start TPN tomorrow (11/16), provider aware  Patient has dextrose containing fluids ordered     Jessica Durham, KarenD

## 2023-11-15 NOTE — PROGRESS NOTES
4 Eyes Skin Assessment Completed by Clem RN and Sol, RN.    Head WDL  Ears WDL  Nose R Nare NG in place, Insertion Site CDI   Mouth WDL  Neck WDL  Breast/Chest WDL  Shoulder Blades WDL  Spine WDL  (R) Arm/Elbow/Hand WDL  (L) Arm/Elbow/Hand WDL  Abdomen Incision, Healed  Groin Juarez in place, Insertion Site CDI   Scrotum/Coccyx/Buttocks Dusky   (R) Leg WDL  (L) Leg WDL  (R) Heel/Foot/Toe WDL  (L) Heel/Foot/Toe WDL          Devices In Places Blood Pressure Cuff, Pulse Ox, Juarez, and OG/NG      Interventions In Place Gray Ear Foams, Waffle Overlay, Pillows, and Pressure Redistribution Mattress    Possible Skin Injury No    Pictures Uploaded Into Epic N/A  Wound Consult Placed N/A  RN Wound Prevention Protocol Ordered No

## 2023-11-15 NOTE — PROGRESS NOTES
[facility] Medicine Daily Progress Note    Date of Service  11/15/2023    Chief Complaint  Nghia Coffman Jr is a 63 y.o. male admitted 11/11/2023 with small bowel obstruction.    Hospital Course  Nghia Coffman Jr is a 63 y.o. male who presented 11/11/2023 with past medical history of metastatic renal cell carcinoma who comes into the hospital for abdominal distention for the past 4 days.  His last bowel movement was also 4 days ago.  He is currently not passing gas.  He has had repeated episodes of vomiting.  3 weeks ago patient had C. difficile where he was taking oral vancomycin and has only 3 to 4 days left.  In August he had a left-sided nephrectomy and patient states since then his bowels has not been working correctly.  Patient had only 1 dose of Keytruda and is currently not on chemotherapy.     Patient was transferred from [facility] where he had a CT scan that found metastatic liver disease, ascites and a mechanical small bowel obstruction with the transition in the distal ileum central upper pelvis.     WBC 11.2, hemoglobin 8.6, platelets 601, lipase 51, creatinine 1.2.         Interval Problem Update  11/13/23  Patient continues to have NG tube in place with significant output.  Continue to receive rectal vancomycin.  Patient is status post left nephrectomy on 8/2/2023.  Sodium mildly low at 132.  Hemoglobin stable at 7.2.  White count stable at 8.6.    11/14 patient in bed, no fever or chills, family at bedside, passing small flatus, and some bm as per nurse staff, surgery notes reviewed, SBFT today. Continue vanco for c diff.   11/15 patient in bed, wife at bedside, no new complains, ngt in placed, discussed regarding SBFT results, I have asked oncologist Dr Pantoja for evaluation and recommendations, patient is alert and oriented, discussed regarding TPN and picc line he would like to wait until discussion with oncologist.     I have discussed this patient's plan of care and  discharge plan at IDT rounds today with Case Management, Nursing, Nursing leadership, and other members of the IDT team.    Consultants/Specialty  general surgery  Oncologist.     Code Status  Full Code    Disposition  The patient is not medically cleared for discharge to home or a post-acute facility.      I have placed the appropriate orders for post-discharge needs.    Review of Systems  Review of Systems   Constitutional:  Negative for chills and fever.   Respiratory:  Negative for cough and shortness of breath.    Cardiovascular:  Negative for chest pain and palpitations.   Gastrointestinal:  Positive for nausea. Negative for abdominal pain and vomiting.   Genitourinary:  Negative for dysuria and hematuria.   Musculoskeletal:  Negative for joint pain and myalgias.   Neurological:  Negative for dizziness and headaches.        Physical Exam  Temp:  [36.5 °C (97.7 °F)-36.8 °C (98.2 °F)] 36.8 °C (98.2 °F)  Pulse:  [] 109  Resp:  [16-18] 18  BP: (102-125)/(62-75) 102/68  SpO2:  [91 %-98 %] 91 %    Physical Exam  Vitals and nursing note reviewed.   Constitutional:       General: He is not in acute distress.     Appearance: He is ill-appearing.   HENT:      Head: Normocephalic and atraumatic.      Nose:      Comments: Nasogastric tube in place     Mouth/Throat:      Mouth: Mucous membranes are moist.      Pharynx: Oropharynx is clear. No oropharyngeal exudate.   Eyes:      General: No scleral icterus.        Right eye: No discharge.         Left eye: No discharge.      Conjunctiva/sclera: Conjunctivae normal.   Cardiovascular:      Rate and Rhythm: Normal rate and regular rhythm.      Pulses: Normal pulses.      Heart sounds: Normal heart sounds. No murmur heard.  Pulmonary:      Effort: Pulmonary effort is normal. No respiratory distress.      Breath sounds: Normal breath sounds.   Abdominal:      General: Abdomen is flat. Bowel sounds are normal. There is distension.      Palpations: Abdomen is soft.       Tenderness: There is abdominal tenderness.   Musculoskeletal:         General: No swelling.      Cervical back: Neck supple. No tenderness.      Right lower leg: No edema.      Left lower leg: No edema.   Skin:     General: Skin is warm and dry.      Coloration: Skin is not pale.   Neurological:      Mental Status: He is alert and oriented to person, place, and time. Mental status is at baseline.      Motor: No weakness.   Psychiatric:         Thought Content: Thought content normal.         Judgment: Judgment normal.         Fluids    Intake/Output Summary (Last 24 hours) at 11/15/2023 1021  Last data filed at 11/15/2023 0745  Gross per 24 hour   Intake 2236.08 ml   Output 2240 ml   Net -3.92 ml         Laboratory  Recent Labs     11/13/23  0807 11/14/23  0751 11/15/23  0417   WBC 8.6 9.9 10.1   RBC 2.74* 2.80* 2.86*   HEMOGLOBIN 7.2* 7.2* 7.5*   HEMATOCRIT 24.1* 24.4* 24.4*   MCV 88.0 87.1 85.3   MCH 26.3* 25.7* 26.2*   MCHC 29.9* 29.5* 30.7*   RDW 50.6* 50.4* 49.3   PLATELETCT 441 416 431   MPV 9.4 9.2 9.2       Recent Labs     11/13/23  0807 11/14/23  0751 11/15/23  0417   SODIUM 132* 134* 135   POTASSIUM 4.7 4.1 4.1   CHLORIDE 96 96 97   CO2 23 24 24   GLUCOSE 95 90 106*   BUN 14 12 11   CREATININE 0.93 0.85 0.80   CALCIUM 8.3* 8.0* 8.1*                 Recent Labs     11/15/23  0417   TRIGLYCERIDE 122       Imaging  DX-SMALL BOWEL SERIES   Final Result      1. High-grade distal mechanical small bowel obstruction, likely in the ileum.   2. No complete bowel obstruction.      I, Dr. Del Piña, discussed the results of this examination directly by phone with Dr. Montanez on 11/14/2023 at 1627 hours.      DX-ABDOMEN FOR TUBE PLACEMENT   Final Result      1.  Enteric tube has been adjusted and now projects over the gastric body in satisfactory position.      DX-ABDOMEN FOR TUBE PLACEMENT   Final Result      1.  Presumed enteric tube projects over the upper mid thorax likely in the thoracic esophagus above  the level of the aster. It is  recommended this be removed and reinserted or readjusted.      2.  Findings were originally called to the patient's nurse on 11/12/2023 at 1:23 AM however she was unavailable due to assisting another patient. Findings were discussed with the patient's nurse Haja, 1:45 AM on 11/12/2023.      OUTSIDE IMAGES-CT ABDOMEN /PELVIS   Final Result      IR-PICC LINE PLACEMENT W/ GUIDANCE > AGE 5    (Results Pending)        Assessment/Plan  * SBO (small bowel obstruction) (HCC)- (present on admission)  Assessment & Plan  Mechanical both small bowel obstruction  Recent C. difficile colitis  Keep n.p.o.  NG tube  IV hydration  Pain control  Surgery has been consulted    11/13/23  Continue NG tube to suction  Strict n.p.o.  Continue IV fluids LR at 100 mils per hour      SBFT positive for bowel obstruction likely ileus. Discussed with gen surgery .    Recurrent Clostridioides difficile infection  Assessment & Plan  Continue oral vancomycin  Start IV Flagyl    11/13/23  Unable to take oral vancomycin  Continue vancomycin enemas and IV metronidazole    Discussed with pharmacist on 11/14 patient has completed treatment for c diff.     Normocytic anemia  Assessment & Plan  Iron panel and reticulocyte count    11/13/23  Likely anemia of chronic inflammatory state  Continue to treat underlying cause including C. difficile colitis  Hb stable. Monitoring.     Severe protein-calorie malnutrition (HCC)- (present on admission)  Assessment & Plan  Start IV thiamine  Monitor electrolytes    Patient will probably require TPN, discussed with patient and wife today, he would like to wait to discuss with oncologist.     Malignant neoplasm of left kidney (HCC)- (present on admission)  Assessment & Plan  History of nephrectomy in August  Metastatic to lungs, liver, lymph nodes  Palliative consult  Discussed with Dr Pantoja.          VTE prophylaxis: heparin    I have performed a physical exam and reviewed and  updated ROS and Plan today (11/15/2023). In review of yesterday's note (11/14/2023), there are no changes except as documented above.      Greater than 52 minutes spent prepping to see patient (e.g. reviewing  tests/imaging results, notes from consultants, bedside nurse, night shift ) obtaining and/or reviewing separately obtained history. Performing a medically appropriate examination and evaluation.  Counseling and educating the patient.  Ordering medications, tests, or procedures.  Referring and communicating with other health care professionals.  Documenting clinical information in EPIC.  Independently interpreting results and communicating results to patient.  Care coordination.

## 2023-11-15 NOTE — CARE PLAN
Problem: Knowledge Deficit - Standard  Goal: Patient and family/care givers will demonstrate understanding of plan of care, disease process/condition, diagnostic tests and medications  Outcome: Progressing     Problem: Fall Risk  Goal: Patient will remain free from falls  Outcome: Progressing     Problem: Pain - Standard  Goal: Alleviation of pain or a reduction in pain to the patient’s comfort goal  Outcome: Progressing   The patient is Stable - Low risk of patient condition declining or worsening    Shift Goals  Clinical Goals: TPN Initiation  Patient Goals: Pain Control  Family Goals: N/A    Progress made toward(s) clinical / shift goals:  TPN pending Initiation, Pain medicated per MAR, Patient Free from falls at this time       Patient is not progressing towards the following goals:

## 2023-11-15 NOTE — PROGRESS NOTES
Assumed care of patient at 0645. Bedside report received. Assessment complete.  AA&Ox4. Denies CP/SOB.  Reporting 8/10 pain. Pain Medicated per MAR   Educated patient regarding pharmacologic and non pharmacologic modalities for pain management.  Skin per flowsheets  Strict NPO. Denies N/V.  L Nare NG to Low Continuous Wall Suction   + void via Juarez Catheter. Last BM 11/14  Pt ambulates SBA w FWW  All needs met at this time. Call light within reach. Pt calls appropriately. Bed low and locked, non skid socks in place. Hourly rounding in place.

## 2023-11-15 NOTE — PROGRESS NOTES
Fingerstick resulted at 78 at 2330. Updated hospitalist about result and concern for hypoglycemia on next glucose check at 0600. Orders placed. AM labs resulted blood sugar 106.

## 2023-11-16 NOTE — PROGRESS NOTES
"  DATE: 11/16/2023    Hospital Day 5  large retroperitoneal tumor with obstruction .    INTERVAL EVENTS:  NG tube with 980 mL of documented output over the last 24 hours.  No bowel movements or flatus since contrast administration for small bowel series.    REVIEW OF SYSTEMS:  Review of systems is remarkable for the following back pain. The remainder of the comprehensive ROS is negative with the exception of the aforementioned HPI, PMH, and PSH bullets in accordance with CMS guideline.    PHYSICAL EXAMINATION:  Vital Signs: /79   Pulse 97   Temp 36.6 °C (97.9 °F) (Temporal)   Resp 15   Ht 1.854 m (6' 1\")   Wt 72.4 kg (159 lb 9.8 oz)   SpO2 96%     Physical Exam  Constitutional:       General: He is not in acute distress.     Appearance: He is not toxic-appearing or diaphoretic.   Pulmonary:      Effort: Pulmonary effort is normal. No respiratory distress.   Abdominal:      General: Bowel sounds are decreased. There is distension (mild).      Tenderness: There is no abdominal tenderness. There is no guarding.      Comments: Abdomen firm to palpation.   Neurological:      Mental Status: He is alert. Mental status is at baseline.       LABORATORY VALUES:  Recent Labs     11/14/23  0751 11/15/23  0417 11/16/23  0359 11/16/23  0807   WBC 9.9 10.1  --  9.1   RBC 2.80* 2.86*  --  2.74*   HEMOGLOBIN 7.2* 7.5* 7.1* 7.1*   HEMATOCRIT 24.4* 24.4* 23.5* 23.9*   MCV 87.1 85.3  --  87.2   MCH 25.7* 26.2*  --  25.9*   MCHC 29.5* 30.7*  --  29.7*   RDW 50.4* 49.3  --  49.7   PLATELETCT 416 431  --  324   MPV 9.2 9.2  --  8.9*     Recent Labs     11/14/23  0751 11/15/23  0417 11/16/23  0359   SODIUM 134* 135 134*   POTASSIUM 4.1 4.1 3.5*   CHLORIDE 96 97 99   CO2 24 24 26   GLUCOSE 90 106* 179*   BUN 12 11 8   CREATININE 0.85 0.80 0.74   CALCIUM 8.0* 8.1* 7.8*     Recent Labs     11/14/23  0751 11/15/23  0417 11/16/23  0359   ASTSGOT 12 12 17   ALTSGPT 6 5 <5   TBILIRUBIN 0.3 0.3 0.2   ALKPHOSPHAT 133* 128* 118* "   GLOBULIN 3.0 3.0 3.0     IMAGING:  DX-SMALL BOWEL SERIES   Final Result      1. High-grade distal mechanical small bowel obstruction, likely in the ileum.   2. No complete bowel obstruction.      I, Dr. Del Piña, discussed the results of this examination directly by phone with Dr. Montanez on 11/14/2023 at 1627 hours.      DX-ABDOMEN FOR TUBE PLACEMENT   Final Result      1.  Enteric tube has been adjusted and now projects over the gastric body in satisfactory position.      DX-ABDOMEN FOR TUBE PLACEMENT   Final Result      1.  Presumed enteric tube projects over the upper mid thorax likely in the thoracic esophagus above the level of the aster. It is  recommended this be removed and reinserted or readjusted.      2.  Findings were originally called to the patient's nurse on 11/12/2023 at 1:23 AM however she was unavailable due to assisting another patient. Findings were discussed with the patient's nurse Haja, 1:45 AM on 11/12/2023.      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  CT with retroperitoneal mass causing obstruction.  11/14 Small bowel series with obstruction.  Continue bowel rest with NG tube for decompression.         ____________________________________     CHERELLE Luu.

## 2023-11-16 NOTE — PROGRESS NOTES
[facility] Medicine Daily Progress Note    Date of Service  11/16/2023    Chief Complaint  Nghia Coffman Jr is a 63 y.o. male admitted 11/11/2023 with small bowel obstruction.    Hospital Course  Nghia Coffman Jr is a 63 y.o. male who presented 11/11/2023 with past medical history of metastatic renal cell carcinoma who comes into the hospital for abdominal distention for the past 4 days.  His last bowel movement was also 4 days ago.  He is currently not passing gas.  He has had repeated episodes of vomiting.  3 weeks ago patient had C. difficile where he was taking oral vancomycin and has only 3 to 4 days left.  In August he had a left-sided nephrectomy and patient states since then his bowels has not been working correctly.  Patient had only 1 dose of Keytruda and is currently not on chemotherapy.     Patient was transferred from [facility] where he had a CT scan that found metastatic liver disease, ascites and a mechanical small bowel obstruction with the transition in the distal ileum central upper pelvis.     WBC 11.2, hemoglobin 8.6, platelets 601, lipase 51, creatinine 1.2.         Interval Problem Update  11/13/23  Patient continues to have NG tube in place with significant output.  Continue to receive rectal vancomycin.  Patient is status post left nephrectomy on 8/2/2023.  Sodium mildly low at 132.  Hemoglobin stable at 7.2.  White count stable at 8.6.    11/14 patient in bed, no fever or chills, family at bedside, passing small flatus, and some bm as per nurse staff, surgery notes reviewed, SBFT today. Continue vanco for c diff.   11/15 patient in bed, wife at bedside, no new complains, ngt in placed, discussed regarding SBFT results, I have asked oncologist Dr Pantoja for evaluation and recommendations, patient is alert and oriented, discussed regarding TPN and picc line he would like to wait until discussion with oncologist.   11/16 patient in bed, c/o back pain no controlled  mostly at night, I have adjusted his iv dilaudid, I am monitoring for side effect including but not limited to respiratory depression/AMS. Discussed with patient, wife, discussed with oncologist, TPN and picc line ordered for today. All questions answered.     I have discussed this patient's plan of care and discharge plan at IDT rounds today with Case Management, Nursing, Nursing leadership, and other members of the IDT team.    Consultants/Specialty  general surgery  Oncologist.     Code Status  Full Code    Disposition  The patient is not medically cleared for discharge to home or a post-acute facility.      I have placed the appropriate orders for post-discharge needs.    Review of Systems  Review of Systems   Constitutional:  Negative for chills and fever.   Respiratory:  Negative for cough and shortness of breath.    Cardiovascular:  Negative for chest pain and palpitations.   Gastrointestinal:  Positive for nausea. Negative for abdominal pain and vomiting.   Genitourinary:  Negative for dysuria and hematuria.   Musculoskeletal:  Negative for joint pain and myalgias.   Neurological:  Negative for dizziness and headaches.        Physical Exam  Temp:  [36 °C (96.8 °F)-36.8 °C (98.2 °F)] 36.3 °C (97.3 °F)  Pulse:  [] 87  Resp:  [15-20] 20  BP: ()/(64-79) 117/78  SpO2:  [92 %-97 %] 95 %    Physical Exam  Vitals and nursing note reviewed.   Constitutional:       General: He is not in acute distress.     Appearance: He is ill-appearing.   HENT:      Head: Normocephalic and atraumatic.      Nose:      Comments: Nasogastric tube in place     Mouth/Throat:      Mouth: Mucous membranes are moist.      Pharynx: Oropharynx is clear. No oropharyngeal exudate.   Eyes:      General: No scleral icterus.        Right eye: No discharge.         Left eye: No discharge.      Conjunctiva/sclera: Conjunctivae normal.   Cardiovascular:      Rate and Rhythm: Normal rate and regular rhythm.      Pulses: Normal pulses.       Heart sounds: Normal heart sounds. No murmur heard.  Pulmonary:      Effort: Pulmonary effort is normal. No respiratory distress.      Breath sounds: Normal breath sounds.   Abdominal:      General: Abdomen is flat. Bowel sounds are normal. There is distension.      Palpations: Abdomen is soft.      Tenderness: There is abdominal tenderness.   Musculoskeletal:         General: No swelling.      Cervical back: Neck supple. No tenderness.      Right lower leg: No edema.      Left lower leg: No edema.   Skin:     General: Skin is warm and dry.      Coloration: Skin is not pale.   Neurological:      Mental Status: He is alert and oriented to person, place, and time. Mental status is at baseline.      Motor: No weakness.   Psychiatric:         Thought Content: Thought content normal.         Judgment: Judgment normal.         Fluids    Intake/Output Summary (Last 24 hours) at 11/16/2023 1331  Last data filed at 11/16/2023 1303  Gross per 24 hour   Intake 2598.97 ml   Output 1680 ml   Net 918.97 ml         Laboratory  Recent Labs     11/14/23  0751 11/15/23  0417 11/16/23  0359 11/16/23  0807   WBC 9.9 10.1  --  9.1   RBC 2.80* 2.86*  --  2.74*   HEMOGLOBIN 7.2* 7.5* 7.1* 7.1*   HEMATOCRIT 24.4* 24.4* 23.5* 23.9*   MCV 87.1 85.3  --  87.2   MCH 25.7* 26.2*  --  25.9*   MCHC 29.5* 30.7*  --  29.7*   RDW 50.4* 49.3  --  49.7   PLATELETCT 416 431  --  324   MPV 9.2 9.2  --  8.9*       Recent Labs     11/14/23  0751 11/15/23  0417 11/16/23  0359   SODIUM 134* 135 134*   POTASSIUM 4.1 4.1 3.5*   CHLORIDE 96 97 99   CO2 24 24 26   GLUCOSE 90 106* 179*   BUN 12 11 8   CREATININE 0.85 0.80 0.74   CALCIUM 8.0* 8.1* 7.8*                 Recent Labs     11/15/23  0417   TRIGLYCERIDE 122         Imaging  DX-SMALL BOWEL SERIES   Final Result      1. High-grade distal mechanical small bowel obstruction, likely in the ileum.   2. No complete bowel obstruction.      IDr. Del, discussed the results of this examination  directly by phone with Dr. Montanez on 11/14/2023 at 1627 hours.      DX-ABDOMEN FOR TUBE PLACEMENT   Final Result      1.  Enteric tube has been adjusted and now projects over the gastric body in satisfactory position.      DX-ABDOMEN FOR TUBE PLACEMENT   Final Result      1.  Presumed enteric tube projects over the upper mid thorax likely in the thoracic esophagus above the level of the aster. It is  recommended this be removed and reinserted or readjusted.      2.  Findings were originally called to the patient's nurse on 11/12/2023 at 1:23 AM however she was unavailable due to assisting another patient. Findings were discussed with the patient's nurse Haja, 1:45 AM on 11/12/2023.      OUTSIDE IMAGES-CT ABDOMEN /PELVIS   Final Result      IR-PICC LINE PLACEMENT W/ GUIDANCE > AGE 5    (Results Pending)        Assessment/Plan  * SBO (small bowel obstruction) (HCC)- (present on admission)  Assessment & Plan  Mechanical both small bowel obstruction  Recent C. difficile colitis  Keep n.p.o.  NG tube  IV hydration  Pain control  Surgery has been consulted    11/13/23  Continue NG tube to suction  Strict n.p.o.  Continue IV fluids LR at 100 mils per hour      SBFT positive for bowel obstruction likely ileus. Discussed with gen surgery .  Continue supportive treatment  Picc line ordered for TPN.     Recurrent Clostridioides difficile infection  Assessment & Plan  Continue oral vancomycin  Start IV Flagyl    11/13/23  Unable to take oral vancomycin  Continue vancomycin enemas and IV metronidazole    Discussed with pharmacist on 11/14 patient has completed treatment for c diff.     Normocytic anemia  Assessment & Plan  Iron panel and reticulocyte count    11/13/23  Likely anemia of chronic inflammatory state  Continue to treat underlying cause including C. difficile colitis  Hb stable. Monitoring.     Severe protein-calorie malnutrition (HCC)- (present on admission)  Assessment & Plan  Start IV thiamine  Monitor  electrolytes    Patient will probably require TPN, discussed with patient and wife today, he would like to wait to discuss with oncologist.     Picc line ordered for tpn today. 11/16    Malignant neoplasm of left kidney (HCC)- (present on admission)  Assessment & Plan  History of nephrectomy in August  Metastatic to lungs, liver, lymph nodes  Palliative consult  Discussed with Dr Pantoja.          VTE prophylaxis: heparin    I have performed a physical exam and reviewed and updated ROS and Plan today (11/16/2023). In review of yesterday's note (11/15/2023), there are no changes except as documented above.      Greater than 51 minutes spent prepping to see patient (e.g. reviewing  tests/imaging results, notes from consultants, bedside nurse, night shift ) obtaining and/or reviewing separately obtained history. Performing a medically appropriate examination and evaluation.  Counseling and educating the patient.  Ordering medications, tests, or procedures.  Referring and communicating with other health care professionals.  Documenting clinical information in EPIC.  Independently interpreting results and communicating results to patient.  Care coordination.    Labs for in am ordered.

## 2023-11-16 NOTE — CARE PLAN
Problem: Knowledge Deficit - Standard  Goal: Patient and family/care givers will demonstrate understanding of plan of care, disease process/condition, diagnostic tests and medications  Outcome: Progressing     Problem: Fall Risk  Goal: Patient will remain free from falls  Outcome: Progressing     Problem: Pain - Standard  Goal: Alleviation of pain or a reduction in pain to the patient’s comfort goal  Outcome: Progressing   The patient is Stable - Low risk of patient condition declining or worsening    Shift Goals  Clinical Goals: Pain Control  Patient Goals: Pain Control  Family Goals: N/A    Progress made toward(s) clinical / shift goals:  Pain Controlled per MAR, Patient Free from falls at this time, Educated patient on plan of care this shift, Patient Verbalized understanding     Patient is not progressing towards the following goals:

## 2023-11-16 NOTE — CARE PLAN
The patient is Stable - Low risk of patient condition declining or worsening    Shift Goals  Clinical Goals: Pain control  Patient Goals: Pain control, rest  Family Goals: N/A    Progress made toward(s) clinical / shift goals:      Patient is not progressing towards the following goals:  Pain medication administered PRN, patient able to sleep intermittently but patient stating pain is not improving and increasing.

## 2023-11-16 NOTE — PROGRESS NOTES
Assumed care of patient at 0645. Bedside report received. Assessment complete.  AA&Ox4. Denies CP/SOB.  Reporting 9/10 pain. Medicated per MAR.  R Hai RICHARDS to Low Continuous Suction, Gastric Aspirate Present   Educated patient regarding pharmacologic and non pharmacologic modalities for pain management.  Skin per flowsheets  NPO Strict. Denies N/V.  + void via Juarez Catheter Last BM 11/14  Pt ambulates SBA w FWW  All needs met at this time. Call light within reach. Pt calls appropriately. Bed low and locked, non skid socks in place. Hourly rounding in place.

## 2023-11-16 NOTE — THERAPY
Physical Therapy   Initial Evaluation     Patient Name: Nhgia Coffman Jr  Age:  63 y.o., Sex:  male  Medical Record #: [id number]  Today's Date: 11/15/2023     Precautions  Precautions: Fall Risk;Nasogastric Tube    Assessment  Patient is 63 y.o. male admitted with SBO. PMH includes metastatic renal cell carcinoma. SBO currently being managed non-op with NG tube. Pts wife present during assessment. Encouragement required for pt participation. Pt is functioning below his prior level most limited by general fatigue. PT will cont while pt is in acute care setting to address deficits.     Plan    Physical Therapy Initial Treatment Plan   Treatment Plan : Gait Training, Neuro Re-Education / Balance, Self Care / Home Evaluation, Therapeutic Activities, Stair Training, Therapeutic Exercise  Treatment Frequency: 3 Times per Week  Duration: Until Therapy Goals Met    DC Equipment Recommendations: None  Discharge Recommendations: Recommend home health for continued physical therapy services          11/15/23 1348   Pain 0 - 10 Group   Therapist Pain Assessment During Activity;Post Activity Pain Same as Prior to Activity;Nurse Notified   Non Verbal Descriptors   Non Verbal Scale  Calm   Prior Living Situation   Prior Services Home-Independent   Housing / Facility 1 Story House   Steps Into Home 5   Steps In Home 0   Bathroom Set up Walk In Shower;Built-In Shower Chair;Shower Chair   Equipment Owned Front-Wheel Walker;Wheelchair   Lives with - Patient's Self Care Capacity Spouse   Comments wife can assist, daughter local as well   Prior Level of Functional Mobility   Bed Mobility Independent   Transfer Status Independent   Ambulation Independent   Ambulation Distance household   Assistive Devices Used Front-Wheel Walker   Stairs Independent   Comments pt progressively declining in mobility over the last few months   Cognition    Cognition / Consciousness WDL   Comments required some education to participate   Active ROM  Lower Body    Active ROM Lower Body  WDL   Strength Lower Body   Lower Body Strength  X   Gross Strength Generalized Weakness, Equal Bilaterally   Sensation Lower Body   Lower Extremity Sensation   WDL   Coordination Lower Body    Coordination Lower Body  WDL   Other Treatments   Other Treatments Provided wife at bedside   Balance Assessment   Sitting Balance (Static) Fair +   Sitting Balance (Dynamic) Fair +   Standing Balance (Static) Fair   Standing Balance (Dynamic) Fair -   Weight Shift Sitting Fair   Weight Shift Standing Fair   Comments FWW   Bed Mobility    Supine to Sit Contact Guard Assist   Scooting Supervised   Comments HOB elevated, uses recliner at home   Gait Analysis   Gait Level Of Assist Contact Guard Assist   Assistive Device Front Wheel Walker   Distance (Feet) 15   # of Times Distance was Traveled 1   Deviation Shuffled Gait;Bradykinetic   Weight Bearing Status no restrictions   Functional Mobility   Sit to Stand Contact Guard Assist   Bed, Chair, Wheelchair Transfer Contact Guard Assist   Transfer Method Stand Step   Mobility in room with FWW   Edema / Skin Assessment   Edema / Skin  Not Assessed   Patient / Family Goals    Patient / Family Goal #1 none stated   Short Term Goals    Short Term Goal # 1 pt will be able to complete functional transfers with FWW and SPV in 6tx in order to decrease fall risk   Short Term Goal # 2 pt will be able to ambulate 50ft with FWW and SPV in 6tx in order to decrease fall risk   Short Term Goal # 3 pt will be able to negotiate 3 steps with SBA in 6tx in order to enter and exit home with spouse   Education Group   Education Provided Role of Physical Therapist   Role of Physical Therapist Patient Response Patient;Family;Acceptance;Explanation;Demonstration;Verbal Demonstration;Action Demonstration   Anticipated Discharge Equipment and Recommendations   DC Equipment Recommendations None   Discharge Recommendations Recommend home health for continued physical  therapy services

## 2023-11-16 NOTE — PROGRESS NOTES
Bedside report received from yasmany RN, assumed care at 1900.  Assessment complete.  A&O x 4. Patient calls appropriately.  Patient ambulates with standby assist with FWW. Bed alarm on.   Patient has 7/10 pain. Pain managed with prescribed medications.  Denies N&V. Strict NPO, R nare Ng to low continuous suction.  + void, + flatus, - BM.  Patient denies SOB. On room air.  Patient calm, pleasant, and cooperative.  Review plan with of care with patient. Call light and personal belongings within reach. Hourly rounding in place. All needs met at this time.

## 2023-11-16 NOTE — DISCHARGE PLANNING
Case Management Discharge Planning    Admission Date: 11/11/2023  GMLOS: 4.6  ALOS: 5    6-Clicks ADL Score: 11  6-Clicks Mobility Score: 14  PT and/or OT Eval ordered: Yes  Post-acute Referrals Ordered: Yes  Post-acute Choice Obtained: Yes  Has referral(s) been sent to post-acute provider:  Yes      Anticipated Discharge Dispo: Discharge Disposition: Discharged to home/self care (01) vs Home with Home Health    DME Needed: No    Action(s) Taken: Updated Provider/Nurse on Discharge Plan    Pt was discussed in IDT rounds with Dr Wells . Pt was seen by Palliative yesterday .  Per chart Pt/family are receptive to discussion about hospice.  Pt is eager to talk to Dr Pantoja of Oncology.  Per chart Pt's daughter willing to take Pt and his Spouse with her in her home in [place]  for hospice.   Pt is from [facility] and there is no OP Hospice that services the area.     There is  no Hospice/Home Health company that services [place] .     PT and OT recommends Home Health.     Escalations Completed: None    Medically Clear: No    Next Steps:   CM to continue to continue to assist Pt with discharge as needed    Barriers to Discharge:   Medical clearance  Home Health vs Hospice?       Is the patient up for discharge tomorrow: No

## 2023-11-16 NOTE — ASSESSMENT & PLAN NOTE
Recent history of left nephrectomy & metastatic renal cell carcinoma.  CT with retroperitoneal mass causing obstruction.  11/14 Small bowel series with obstruction.  Continue bowel rest with NG tube for decompression.

## 2023-11-17 NOTE — CARE PLAN
The patient is Stable - Low risk of patient condition declining or worsening    Shift Goals: Pain Management, Up to chair, TPN and NG maintenance  Clinical Goals: Pain Management, TPN, NG Maintenance, Up to Chair  Patient Goals: Pain control, rest  Family Goals: N/A    Progress made toward(s) clinical / shift goals:  Pt got up to chair with PT, Pt resting comfortably in chair with pain well managed, NG running on low continuous suction  Patient is not progressing towards the following goals: Pt needs reinforcement of education surrounding TPN administration      Problem: Knowledge Deficit - Standard  Goal: Patient and family/care givers will demonstrate understanding of plan of care, disease process/condition, diagnostic tests and medications  Outcome: Progressing     Problem: Fall Risk  Goal: Patient will remain free from falls  Outcome: Progressing     Problem: Pain - Standard  Goal: Alleviation of pain or a reduction in pain to the patient’s comfort goal  Outcome: Progressing     Problem: Mobility  Goal: Patient's capacity to carry out activities will improve  Outcome: Progressing

## 2023-11-17 NOTE — CARE PLAN
The patient is Stable - Low risk of patient condition declining or worsening    Shift Goals  Clinical Goals: Pain Control; TPN Infusion; NGT Maintenance  Patient Goals: Pain Control; Rest  Family Goals: N/A    Progress made toward(s) clinical / shift goals:  Patient medicated per MAR. Non-pharmacologic comfort measures implemented. Safety discussed. Education provided. Ambulation and repositioning encouraged.     Problem: Knowledge Deficit - Standard  Goal: Patient and family/care givers will demonstrate understanding of plan of care, disease process/condition, diagnostic tests and medications  Outcome: Progressing     Problem: Pain - Standard  Goal: Alleviation of pain or a reduction in pain to the patient’s comfort goal  Outcome: Progressing     Problem: Gastrointestinal Irritability  Goal: Nausea and vomiting will be absent or improve  Outcome: Progressing     Problem: Communication  Goal: The ability to communicate needs accurately and effectively will improve  Outcome: Progressing

## 2023-11-17 NOTE — DIETARY
Nutrition Services Brief Update:    Problem: Nutritional:  Goal: Nutrition support tolerated and meeting greater than 85% of estimated needs  Outcome: progressing    TPN initiated last night at 50% of goal.     Labs: potassium=4.3, phos=3.2, mag=2    Pharmacy following for refeeding and replacing electrolytes PRN.      RD following.

## 2023-11-17 NOTE — CONSULTS
DATE OF SERVICE:  11/16/2023     INPATIENT ONCOLOGY CONSULTATION     REASON FOR CONSULTATION:  Metastatic kidney cancer.     HISTORY OF PRESENT ILLNESS:  The patient  is a very nice 63-year-old man with   a history of kidney cancer, followed by my partner, Dr. Barclay.  He is   currently on Keytruda immunotherapy.  He has been admitted now for partial   bowel obstruction.  We have been asked to consult in the case.     In regards to his kidney cancer, this was initially diagnosed towards the end   of 06/2023 when he had been complaining of left-sided flank pain for about 3   months.  He had an ultrasound done that showed a left kidney mass.  A CT scan   of the abdomen and pelvis on 6/29/2023 showed an 18 cm mass on the left kidney   with significant mass effect on the stomach and pancreas.     On 08/02/2023, he was brought to the operating room and underwent a left   radical nephrectomy.  During that surgery, there was concern for pancreas   involvement.  Dr. Portillo was contacted, and he evaluated the patient   intraoperatively.  He apparently had extensive retroperitoneal involvement,   gastrohepatic ligament involvement, as well as extensive pancreas involvement   with encasement of the celiac access, all of which was unresectable.     He continued to recover after the surgery.  The pathology showed a renal cell   carcinoma, non-clear cell subtype (chromophobe, sarcomatous differentiation),   measuring 15.4 cm with positive margins.  Eleven lymph nodes were removed and   one was involved with tumor.  This was felt that he had stage IV disease (pT4,   pN1).     He had some postoperative complications.  On 08/29/2023, he had a CT scan that   showed a 13.4 cm collection in the renal bed.  There was a mixture of gas,   fluid, as well as tissue density.  There was some question of a hematoma   versus abscess versus residual tumor.  That scan also showed a 1.7 cm right   upper lobe spiculated mass.  He  eventually had a CT-guided biopsy of the lung   mass and that returned showing metastatic cancer.     Overtime, there was some slight increase in the soft tissue   density/collection.  Scans by 10/16/2023 showed that he had increased to 14.7   cm.  He apparently required a SARAH drain placement at one point.  Cultures were   negative.  The fluid seems to be somewhat complex with some portion of it   being blood.  They ruled out a pancreatic leak.     The patient eventually established care with my partner, Dr. Barclay.  He   was also seen at [facility] by Dr. Han who is a kidney cancer specialists.  The   recommendation was for combined treatment with Keytruda immunotherapy as well   as Lenvima targeted therapy.  The Lenvima was held initially because of surgical recovery.     He eventually started his first dose of Keytruda on 09/20/2023.  This was a   400 mg dose to be given every 6 weeks.  He would be due for cycle #2 today,   11/16/2023.  The start of his Lenvima was delayed.     About a month ago, it was determined he could start on the Lenvima.  He did   start on a lower dose of 10 mg per day.  He only took it for 3 days and then   had severe diarrhea.  He ended up in the hospital in [place], [place], where   they live.  He was in the hospital from 10/29/2023 through 10/31/2023  The   Lenvima was held.  He had C. diff testing, which was apparently positive.  He   was put on to oral vancomycin for a 2 week course.  At the time of his   admission here, he only had 3 or 4 more days left of this treatment.     He eventually presented to the ER in [place], [place] again on 11/11/2023,   complaining of 4 days of abdominal distention and no bowel movement or gas   passing for 4 days as well as ongoing nausea and vomiting.  He had a CT scan   done there, which reportedly showed metastatic liver disease, ascites, as well   as mechanical SBO with a possible transition point at the distal ileum.  He   was transferred here  for further care.     Since coming here, he has been seen by the surgery service.  They have elected   for nonoperative management.  He had an NG tube placed.  He had a small bowel   series done on 11/14/2023 that showed a high-grade distal mechanical SBO, but   there was some eventual transition of small amounts of contrast to the cecum   and colon.  The surgeons contacted Dr. Portillo who is familiar with his   case.  I have discussed the case with Dr. Portillo as well.  Dr. Portillo tells me he reviewed the images.  He feels this is kade   progression of disease.  He did have liver metastatic disease.  There was a   large retroperitoneal tumor that was compressing the stomach, duodenum,   descending colon, and other multiple areas of the small bowel.  This was felt   to be inoperable.  They have recommended ongoing conservative management as   well as placement of a PICC line for TPN.     The patient says since coming to the hospital, he has had 3 bowel movements.    He actually had a solid more normal bowel movement today.  Yesterday, they   clamped the NG tube and he had nausea and vomiting.  He has his NG tube back   on suction and feels better.  He does have some ongoing back pain.  We have   been asked to consult in the case.     PAST MEDICAL HISTORY:  1.  Metastatic renal cell carcinoma.  2.  GERD.  3.  Hernia.     PAST SURGICAL HISTORY:  1.  Left nephrectomy.  2.  Right thyroidectomy, 3-4 years ago.  3.  Hernia surgery.     ALLERGIES:  SEASONAL ALLERGIES, only.     MEDICATIONS PRIOR TO ADMISSION:  1.  MiraLax p.r.n..  2.  Stool softeners p.r.n.  3.  Tylenol p.r.n.  4.  Naproxen p.r.n.  5.  Omeprazole 20 mg every day.  6.  Zofran p.r.n.  7.  Oxycodone p.r.n.  8.  Compazine p.r.n.     FAMILY HISTORY:  His mother had breast cancer at age 80.  A  maternal great   grandfather had bladder cancer and was a smoker.     SOCIAL HISTORY:  He is a lifelong nonsmoker.  He is .  He lives with   his  spouse 3 hours from here in [place], [place].  He has 2 stepchildren.  He   used alcohol socially.  He is medically retired .     REVIEW OF SYSTEMS:  The review of systems is positive for the illness listed   above.  Otherwise, complete review of systems per the AMA and CMS criteria is   negative.     PHYSICAL EXAMINATION:    VITAL SIGNS:  His T-max is 98.2, pulse 97, blood pressure 116/79, respirations   15, satting 96% on room air.  GENERAL:  No acute distress, pleasant gentleman. weak and fatigued.  ECOG   equals 2.  HEENT:  NCAT.  Sclerae are anicteric.  Conjunctivae clear.  Oropharynx clear   without any erythema, exudate or discharge.  He has an NG tube in place.  NECK:  Supple, nontender, no JVP, no carotid bruits, no thyromegaly.  CHEST:  Clear to auscultation and percussion bilaterally.  No wheeze, rales or   rhonchi.  CARDIOVASCULAR:  Regular rate and rhythm.  No murmurs, gallops or rubs.    Normal S1, S2.  ABDOMEN:  Soft, mildly distended.  No major tenderness.  No appreciable   masses.  LYMPH NODES:  No cervical, supraclavicular, infraclavicular, axillary or   inguinal lymphadenopathy.  EXTREMITIES:  No cyanosis, clubbing or edema.  Full range of motion.  No joint   swelling.  SKIN:  No rashes, bruising, petechiae, ulcerations, nonhealing wounds.  NEUROLOGIC:  Cranial nerves II-XII are intact.  He has intact sensation to   light touch throughout.  He moves all 4 extremities appropriately.     ASSESSMENT AND PLAN:  The patient is a very nice 63-year-old man with a   medical history listed above including metastatic non-clear renal cell   carcinoma that is a high risk.  The goal has been to treat him with Keytruda   and Lenvima.  The Lenvima was delayed because of surgical procedures.  He   started an every 6 week dose of Keytruda on 09/20/2023.  He will be due for   repeat dose today, but he is stuck in the hospital.  He did try Lenvima for 3   days about 3 weeks ago, but had severe diarrhea.   He is now off the Lenvima.    He is admitted now for mechanical bowel obstruction.     At this point, he has had a surgical consult.  He is felt not to be a surgical   candidate, and there is no surgical intervention that would improve his bowel   function.  This is a very challenging case.  He has a large tumor that seems   to be progressing, but he technically has not had enough treatment yet to say   that he has failed any treatment.  He does have an aggressive subtype that   seems to be progressing significantly.     In regards to the selected therapy, and ihs overall renal cell histology, the   overall response rate with this treatment would be about 30%.  There is a 1   year progression-free survival of 50%.  The median overall survival may be   about 4 years and those who respond.     At this point, he cannot receive a dose of Keytruda while he is in the   hospital.  He would have to be discharged from the hospital.  There is no   other active oncology intervention that I can do that would hasten his   recovery or help in this situation.  He needs further ongoing medical   management.  I think TPN would be a good idea as long as he still intends to   be aggressive.  This will help support him nutritionally and hopefully his   bowels will continue to recover.  Hopefully, he can be discharged from the   hospital soon and continue with the immunotherapy.     Given his overall presentation, hospice certainly is always an option.  If he   can never get out of the hospital, it is unlikely that the ongoing systemic   therapies would be of much benefit.  It may be that his disease is overly   aggressive and fast growing, and ultimately he may not respond to any   treatments.  If this becomes the case, then moving towards comfort care/   hospice would be appropriate.  For now, the hope is still that his bowels will   start moving and he will have enough function that he can be out of the   hospital and continue on  treatments.     At this point, all of his questions were answered.  I will sign off on the   case.  They are instructed to call Dr. Barclay's office as soon as they are   being discharged, so repeat treatment with the Keytruda can be set up.     Please give me a call if I can be of any further assistance.     Thank you very much referral of this very nice gentleman.        ______________________________  MD TARI Holliday/MIRANDA    DD:  11/16/2023 12:50  DT:  11/16/2023 16:18    Job#:  [id number]

## 2023-11-17 NOTE — PROGRESS NOTES
Dr. Portillo has discussed with Dr. Pantoja (oncologist) regarding this case. Appreciate Dr. Pantoja's consult as patient has metastatic renal cell carcinoma. He is seen by Dr. Barclay at [facility] and Dr. Han at [facility]. He is currently on Keytruda and Lenvima is held due to C diff.  Patient case was reviewed by Dr. Portillo and Dr. Cooper and patient has progression of diease including liver mets.  Recommendation is non operative management given his disease status and no obvious obstruction.  Palliative has been consulted. Patient has expressed desire to continue follow up with treatment. Not our role to discuss if patient is a chemotherapy candidate, and defer to Dr. Barclay and those involved in his renal cancer treatment.   Addended by: ALFONSO HINES on: 2/26/2021 03:00 PM     Modules accepted: Orders

## 2023-11-17 NOTE — ASSESSMENT & PLAN NOTE
Recent history of left nephrectomy & metastatic renal cell carcinoma.  CT with retroperitoneal mass causing possible obstruction.  11/14 Small bowel series with obstruction.  11/17 Abdominal xray without residual contrast in the small bowel.  11/18 Two large bowel movements with resolution of obstructive symptoms. Clamp NG tube and trial sips of water.

## 2023-11-17 NOTE — PROGRESS NOTES
"Received report from previous shift RN at 1900.  Assessment complete.  A&O x 4. Patient calls appropriately.  Patient ambulates with x1 hand-held assist.   Patient has 1/10 pain. Pain managed with prescribed medications per MAR.  Denies N&V. PT NPO strict at this time. R NGT in place to LCS.  Skin per flowsheets.  + void via hawkins, + flatus, + BM on 11/16.  Patient denies SOB on room air.  BP (P) 101/80   Pulse (P) 96   Temp (P) 37 °C (98.6 °F) (Temporal)   Resp (P) 20   Ht 1.854 m (6' 1\") Comment: 's license  Wt 72.4 kg (159 lb 9.8 oz)   SpO2 (P) 98%   BMI 21.06 kg/m²     Patient pleasant and cooperative throughout assessment.  Reviewed plan of care with patient, pt verbalizes understanding. Call light and personal belongings with in reach. Hourly rounding in place. All needs met at this time.    "

## 2023-11-17 NOTE — PROGRESS NOTES
"  DATE: 11/17/2023    Hospital Day 6  large retroperitoneal tumor with obstruction .    INTERVAL EVENTS:  NG tube with 950 mL of documented output over the last 24 hours.  Improved obstructive symptoms with reported bowel movement yesterday and passing gas.  Abdominal xray ordered to evaluate contrast movement from prior small bowel series.    REVIEW OF SYSTEMS:  Review of systems is remarkable for the following back pain. The remainder of the comprehensive ROS is negative with the exception of the aforementioned HPI, PMH, and PSH bullets in accordance with CMS guideline.    PHYSICAL EXAMINATION:  Vital Signs: /70   Pulse 92   Temp 36.6 °C (97.9 °F) (Temporal)   Resp (!) 24   Ht 1.854 m (6' 1\")   Wt 72.4 kg (159 lb 9.8 oz)   SpO2 95%     Physical Exam  Constitutional:       General: He is not in acute distress.     Appearance: He is not toxic-appearing or diaphoretic.   Pulmonary:      Effort: Pulmonary effort is normal. No respiratory distress.   Abdominal:      General: Bowel sounds are decreased. There is distension (mild).      Palpations: Abdomen is soft.      Tenderness: There is no abdominal tenderness. There is no guarding.   Neurological:      Mental Status: He is alert. Mental status is at baseline.       LABORATORY VALUES:  Recent Labs     11/15/23  0417 11/16/23  0359 11/16/23  0807 11/17/23 0445   WBC 10.1  --  9.1 12.6*   RBC 2.86*  --  2.74* 2.89*   HEMOGLOBIN 7.5* 7.1* 7.1* 7.7*   HEMATOCRIT 24.4* 23.5* 23.9* 25.5*   MCV 85.3  --  87.2 88.2   MCH 26.2*  --  25.9* 26.6*   MCHC 30.7*  --  29.7* 30.2*   RDW 49.3  --  49.7 50.2*   PLATELETCT 431  --  324 352   MPV 9.2  --  8.9* 9.5     Recent Labs     11/15/23  0417 11/16/23  0359 11/17/23  0445   SODIUM 135 134* 133*   POTASSIUM 4.1 3.5* 4.3   CHLORIDE 97 99 99   CO2 24 26 25   GLUCOSE 106* 179* 133*   BUN 11 8 7*   CREATININE 0.80 0.74 0.70   CALCIUM 8.1* 7.8* 8.3*     Recent Labs     11/15/23  0417 11/16/23  0359 11/17/23  0445   LORIN " 12 17 15   ALTSGPT 5 <5 6   TBILIRUBIN 0.3 0.2 0.2   ALKPHOSPHAT 128* 118* 128*   GLOBULIN 3.0 3.0 3.3     IMAGING:  IR-PICC LINE PLACEMENT W/ GUIDANCE > AGE 5   Final Result                  Ultrasound-guided PICC placement performed by qualified nursing staff as    above.          DX-SMALL BOWEL SERIES   Final Result      1. High-grade distal mechanical small bowel obstruction, likely in the ileum.   2. No complete bowel obstruction.      I, Dr. Del Piña, discussed the results of this examination directly by phone with Dr. Montanez on 11/14/2023 at 1627 hours.      DX-ABDOMEN FOR TUBE PLACEMENT   Final Result      1.  Enteric tube has been adjusted and now projects over the gastric body in satisfactory position.      DX-ABDOMEN FOR TUBE PLACEMENT   Final Result      1.  Presumed enteric tube projects over the upper mid thorax likely in the thoracic esophagus above the level of the aster. It is  recommended this be removed and reinserted or readjusted.      2.  Findings were originally called to the patient's nurse on 11/12/2023 at 1:23 AM however she was unavailable due to assisting another patient. Findings were discussed with the patient's nurse Haja, 1:45 AM on 11/12/2023.      OUTSIDE IMAGES-CT ABDOMEN /PELVIS   Final Result      [id number] VIEW    (Results Pending)     ASSESSMENT AND PLAN:  * SBO (small bowel obstruction) (HCC)- (present on admission)  Assessment & Plan  Recent history of left nephrectomy & metastatic renal cell carcinoma.  CT with retroperitoneal mass causing possible obstruction.  11/14 Small bowel series with obstruction.  11/17 Abdominal xray ordered & pending.  Continue bowel rest with NG tube for decompression.      - Recommend daily bowel regimen  - Will get KUB to evaluate contrast movement    _______________________________       CHERELLE Luu.

## 2023-11-17 NOTE — PROGRESS NOTES
Assumed care of patient at 0645. Bedside report received from Ericka ROBERTS. Assessment complete.  AA&Ox4. Denies CP/SOB.  NG Tube to Low Intermittent suction with gastric contents  Reporting 7/10 pain. Medicated per MAR.   Educated patient regarding pharmacologic and non pharmacologic modalities for pain management.  Skin per flowsheets  Patient strict NPO diet. Denies N/V.  Juarez in place.  Last BM 11/16  Pt sitting in bed  All needs met at this time. Call light within reach. Pt calls appropriately. Bed low and locked, non skid socks in place. Hourly rounding in place.

## 2023-11-17 NOTE — PROGRESS NOTES
[facility] Medicine Daily Progress Note    Date of Service  11/17/2023    Chief Complaint  Nghia Coffman Jr is a 63 y.o. male admitted 11/11/2023 with small bowel obstruction.    Hospital Course  Nghia Coffman Jr is a 63 y.o. male who presented 11/11/2023 with past medical history of metastatic renal cell carcinoma who comes into the hospital for abdominal distention for the past 4 days.  His last bowel movement was also 4 days ago.  He is currently not passing gas.  He has had repeated episodes of vomiting.  3 weeks ago patient had C. difficile where he was taking oral vancomycin and has only 3 to 4 days left.  In August he had a left-sided nephrectomy and patient states since then his bowels has not been working correctly.  Patient had only 1 dose of Keytruda and is currently not on chemotherapy.     Patient was transferred from [facility] where he had a CT scan that found metastatic liver disease, ascites and a mechanical small bowel obstruction with the transition in the distal ileum central upper pelvis.     WBC 11.2, hemoglobin 8.6, platelets 601, lipase 51, creatinine 1.2.         Interval Problem Update  11/13/23  Patient continues to have NG tube in place with significant output.  Continue to receive rectal vancomycin.  Patient is status post left nephrectomy on 8/2/2023.  Sodium mildly low at 132.  Hemoglobin stable at 7.2.  White count stable at 8.6.    11/14 patient in bed, no fever or chills, family at bedside, passing small flatus, and some bm as per nurse staff, surgery notes reviewed, SBFT today. Continue vanco for c diff.   11/15 patient in bed, wife at bedside, no new complains, ngt in placed, discussed regarding SBFT results, I have asked oncologist Dr Pantoja for evaluation and recommendations, patient is alert and oriented, discussed regarding TPN and picc line he would like to wait until discussion with oncologist.   11/16 patient in bed, c/o back pain no controlled  mostly at night, I have adjusted his iv dilaudid, I am monitoring for side effect including but not limited to respiratory depression/AMS. Discussed with patient, wife, discussed with oncologist, TPN and picc line ordered for today. All questions answered.   11/17 in bed, family at bedside, no new complains, pain is better controlled, patient is passing gas and had a BM. Surgical oncology eval today and at this time they are not recommending surgical intervention.     I have discussed this patient's plan of care and discharge plan at IDT rounds today with Case Management, Nursing, Nursing leadership, and other members of the IDT team.    Consultants/Specialty  general surgery  Oncologist.     Code Status  Full Code    Disposition  The patient is not medically cleared for discharge to home or a post-acute facility.      I have placed the appropriate orders for post-discharge needs.    Review of Systems  Review of Systems   Constitutional:  Negative for chills and fever.   Respiratory:  Negative for cough and shortness of breath.    Cardiovascular:  Negative for chest pain and palpitations.   Gastrointestinal:  Positive for nausea. Negative for abdominal pain and vomiting.   Genitourinary:  Negative for dysuria and hematuria.   Musculoskeletal:  Negative for joint pain and myalgias.   Neurological:  Negative for dizziness and headaches.        Physical Exam  Temp:  [36.3 °C (97.3 °F)-37 °C (98.6 °F)] 36.6 °C (97.9 °F)  Pulse:  [86-96] 92  Resp:  [18-24] 24  BP: (101-140)/(65-89) 121/70  SpO2:  [91 %-98 %] 95 %    Physical Exam  Vitals and nursing note reviewed.   Constitutional:       General: He is not in acute distress.     Appearance: He is ill-appearing.   HENT:      Head: Normocephalic and atraumatic.      Nose:      Comments: Nasogastric tube in place     Mouth/Throat:      Mouth: Mucous membranes are moist.      Pharynx: Oropharynx is clear. No oropharyngeal exudate.   Eyes:      General: No scleral icterus.         Right eye: No discharge.         Left eye: No discharge.      Conjunctiva/sclera: Conjunctivae normal.   Cardiovascular:      Rate and Rhythm: Normal rate and regular rhythm.      Pulses: Normal pulses.      Heart sounds: Normal heart sounds. No murmur heard.  Pulmonary:      Effort: Pulmonary effort is normal. No respiratory distress.      Breath sounds: Normal breath sounds.   Abdominal:      General: Abdomen is flat. Bowel sounds are normal. There is distension.      Palpations: Abdomen is soft.      Tenderness: There is abdominal tenderness.   Musculoskeletal:         General: No swelling.      Cervical back: Neck supple. No tenderness.      Right lower leg: No edema.      Left lower leg: No edema.   Skin:     General: Skin is warm and dry.      Coloration: Skin is not pale.   Neurological:      Mental Status: He is alert and oriented to person, place, and time. Mental status is at baseline.      Motor: No weakness.   Psychiatric:         Thought Content: Thought content normal.         Judgment: Judgment normal.         Fluids    Intake/Output Summary (Last 24 hours) at 11/17/2023 1217  Last data filed at 11/17/2023 0910  Gross per 24 hour   Intake 0 ml   Output 2400 ml   Net -2400 ml         Laboratory  Recent Labs     11/15/23  0417 11/16/23  0359 11/16/23  0807 11/17/23 0445   WBC 10.1  --  9.1 12.6*   RBC 2.86*  --  2.74* 2.89*   HEMOGLOBIN 7.5* 7.1* 7.1* 7.7*   HEMATOCRIT 24.4* 23.5* 23.9* 25.5*   MCV 85.3  --  87.2 88.2   MCH 26.2*  --  25.9* 26.6*   MCHC 30.7*  --  29.7* 30.2*   RDW 49.3  --  49.7 50.2*   PLATELETCT 431  --  324 352   MPV 9.2  --  8.9* 9.5       Recent Labs     11/15/23  0417 11/16/23  0359 11/17/23  0445   SODIUM 135 134* 133*   POTASSIUM 4.1 3.5* 4.3   CHLORIDE 97 99 99   CO2 24 26 25   GLUCOSE 106* 179* 133*   BUN 11 8 7*   CREATININE 0.80 0.74 0.70   CALCIUM 8.1* 7.8* 8.3*                 Recent Labs     11/15/23  0417   TRIGLYCERIDE 122         Imaging  IR-PICC LINE PLACEMENT  W/ GUIDANCE > AGE 5   Final Result                  Ultrasound-guided PICC placement performed by qualified nursing staff as    above.          DX-SMALL BOWEL SERIES   Final Result      1. High-grade distal mechanical small bowel obstruction, likely in the ileum.   2. No complete bowel obstruction.      I, Dr. Del Piña, discussed the results of this examination directly by phone with Dr. Montanez on 11/14/2023 at 1627 hours.      DX-ABDOMEN FOR TUBE PLACEMENT   Final Result      1.  Enteric tube has been adjusted and now projects over the gastric body in satisfactory position.      DX-ABDOMEN FOR TUBE PLACEMENT   Final Result      1.  Presumed enteric tube projects over the upper mid thorax likely in the thoracic esophagus above the level of the aster. It is  recommended this be removed and reinserted or readjusted.      2.  Findings were originally called to the patient's nurse on 11/12/2023 at 1:23 AM however she was unavailable due to assisting another patient. Findings were discussed with the patient's nurse Haja, 1:45 AM on 11/12/2023.      OUTSIDE IMAGES-CT ABDOMEN /PELVIS   Final Result           Assessment/Plan  * SBO (small bowel obstruction) (HCC)- (present on admission)  Assessment & Plan  Mechanical both small bowel obstruction  Recent C. difficile colitis  Keep n.p.o.  NG tube  IV hydration  Pain control  Surgery has been consulted    11/13/23  Continue NG tube to suction  Strict n.p.o.  Continue IV fluids LR at 100 mils per hour      SBFT positive for bowel obstruction likely ileus. Discussed with gen surgery .  Continue supportive treatment  Picc line ordered for TPN.     Will continue monitoring supportive treatment.   Patient had bm     Recurrent Clostridioides difficile infection  Assessment & Plan  Continue oral vancomycin  Start IV Flagyl    11/13/23  Unable to take oral vancomycin  Continue vancomycin enemas and IV metronidazole    Discussed with pharmacist on 11/14 patient has  completed treatment for c diff.     Normocytic anemia  Assessment & Plan  Iron panel and reticulocyte count    11/13/23  Likely anemia of chronic inflammatory state  Continue to treat underlying cause including C. difficile colitis  Hb stable. Monitoring.     Severe protein-calorie malnutrition (HCC)- (present on admission)  Assessment & Plan  Start IV thiamine  Monitor electrolytes    Patient will probably require TPN, discussed with patient and wife today, he would like to wait to discuss with oncologist.     Picc line ordered for tpn today. 11/16    Continue TPN.     Malignant neoplasm of left kidney (HCC)- (present on admission)  Assessment & Plan  History of nephrectomy in August  Metastatic to lungs, liver, lymph nodes  Palliative consult  Discussed with Dr Pantoja.     Continue supportive treatment. No indication for inpatient chemo at this time per onco patient needs to discharge from hospital in order to continue treatment.         VTE prophylaxis: heparin    I have performed a physical exam and reviewed and updated ROS and Plan today (11/17/2023). In review of yesterday's note (11/16/2023), there are no changes except as documented above.      My total time spent caring for the patient on the day of the encounter was 39 minutes.   This does not include time spent on separately billable procedures/tests.

## 2023-11-17 NOTE — THERAPY
Physical Therapy   Daily Treatment     Patient Name: Nghia Coffman Jr  Age:  63 y.o., Sex:  male  Medical Record #: [id number]  Today's Date: 11/17/2023     Precautions  Precautions: Fall Risk  Comments: NG Tube with suction, C-Diff    Assessment    Pt continues to present below baseline 2/2 generalized fatigue and weakness. Required Tavia transitioning supine > sitting EOB and was CGA for ambulation with a FWW. Able to ambulate 20 feet x2 with a seated rest break between. Emphasized daily/frequent movement daily to facilitate his endurance due to his concerns of his fatigue. Anticipate progression to baseline with continued POC.     Plan    Treatment Plan Status: Continue Current Treatment Plan  Type of Treatment: Bed Mobility, Gait Training, Self Care / Home Evaluation, Therapeutic Activities, Therapeutic Exercise, Stair Training  Treatment Frequency: 3 Times per Week  Treatment Duration: Until Therapy Goals Met    DC Equipment Recommendations: Front-Wheel Walker  Discharge Recommendations: Recommend post-acute placement for additional physical therapy services prior to discharge home (With potential to return home if daughter is home as accessible)     Objective       11/17/23 1047   Charge Group   Charges  Yes   PT Gait Training (Units) 1   PT Therapeutic Activities (Units) 1   Total Time Spent   PT Total Time Yes   PT Gait Training Time Spent (Mins) 15   PT Self Care/Home Evaluation Time Spent (Mins) 8   PT Total Time Spent (Calculated) 23    Services   Is patient using  services for this encounter? No   Precautions   Precautions Fall Risk   Comments NG Tube with suction, C-Diff   Vitals   O2 Delivery Device None - Room Air   Vitals Comments Stable throughout session   Pain 0 - 10 Group   Therapist Pain Assessment Prior to Activity   Non Verbal Descriptors   Non Verbal Scale  Calm   Cognition    Level of Consciousness Alert   Strength Lower Body   Gross Strength Generalized Weakness,  Equal Bilaterally   Comments Reports fatigue > weakness   Sitting Lower Body Exercises   Sitting Lower Body Exercises Yes   Comments Ankle pumps ~20total   Other Treatments   Other Treatments Provided Educated on the benefits of OOB mobility for facilitating bowels and improving functional activity tolerance   Neurological Concerns   Neurological Concerns No   Balance   Sitting Balance (Static) Fair   Sitting Balance (Dynamic) Fair   Standing Balance (Static) Poor +   Standing Balance (Dynamic) Poor +   Weight Shift Sitting Fair   Weight Shift Standing Poor   Skilled Intervention Verbal Cuing;Compensatory Strategies   Comments Limited by fatigue   Bed Mobility    Supine to Sit Minimal Assist   Scooting Standby Assist   Skilled Intervention Verbal Cuing   Comments Tavia from wife with LUE   Gait Analysis   Gait Level Of Assist Contact Guard Assist   Assistive Device Front Wheel Walker   Deviation Bradykinetic   # of Stairs Climbed 0   Skilled Intervention Verbal Cuing;Compensatory Strategies   Comments 20 feet x2, seated rest break in between   Functional Mobility   Sit to Stand Contact Guard Assist   Bed, Chair, Wheelchair Transfer Contact Guard Assist   Mobility OOB to ambulate with FWW   Skilled Intervention Verbal Cuing   How much difficulty does the patient currently have...   Turning over in bed (including adjusting bedclothes, sheets and blankets)? 1   Sitting down on and standing up from a chair with arms (e.g., wheelchair, bedside commode, etc.) 1   Moving from lying on back to sitting on the side of the bed? 1   How much help from another person does the patient currently need...   Moving to and from a bed to a chair (including a wheelchair)? 3   Need to walk in a hospital room? 3   Climbing 3-5 steps with a railing? 2   6 clicks Mobility Score 11   Activity Tolerance   Comments Limited due to fatigue. Goal for 1 hour up in chair at end of session   Short Term Goals    Short Term Goal # 1 pt will be able to  complete functional transfers with FWW and SPV in 6tx in order to decrease fall risk   Goal Outcome # 1 Progressing as expected   Short Term Goal # 2 pt will be able to ambulate 50ft with FWW and SPV in 6tx in order to decrease fall risk   Goal Outcome # 2 Progressing as expected   Short Term Goal # 3 pt will be able to negotiate 3 steps with SBA in 6tx in order to enter and exit home with spouse   Goal Outcome # 3 Goal not met   Education Group   Education Provided Role of Physical Therapist;Exercises - Seated   Role of Physical Therapist Patient Response Patient;Acceptance;Explanation;Verbal Demonstration;Significant Other   Exercises - Seated Patient Response Patient;Acceptance;Explanation;Action Demonstration   Additional Comments Educated on general daily movement OOB for improved endurance   Physical Therapy Treatment Plan   Physical Therapy Treatment Plan Continue Current Treatment Plan   Treatment Plan  Bed Mobility;Gait Training;Self Care / Home Evaluation;Therapeutic Activities;Therapeutic Exercise;Stair Training   Treatment Frequency 3 Times per Week   Duration Until Therapy Goals Met   Anticipated Discharge Equipment and Recommendations   DC Equipment Recommendations Front-Wheel Walker   Discharge Recommendations Recommend post-acute placement for additional physical therapy services prior to discharge home  (With potential to return home if daughter is home as accessible)   Interdisciplinary Plan of Care Collaboration   IDT Collaboration with  Nursing;Occupational Therapist   Patient Position at End of Therapy Seated;Call Light within Reach;Tray Table within Reach;Family / Friend in Room   Collaboration Comments RN updated   Session Information   Date / Session Number  11/17 - 2 (1/3,11/21)   Priority 4

## 2023-11-17 NOTE — PROGRESS NOTES
"Pharmacy TPN Note for 2023     63 y.o. male on TPN Day # 2      Admission History:   Admitted on 2023 for SBO    Allergies:   Grass pollen(k-o-r-t-swt dinesh), Seasonal, and Juniper tar     Indication for TPN:   Bowel Obstruction/Ileus    Clinical Considerations for TPN:   Re-feeding syndrome        Temp Av.6 °C (97.9 °F), Min:36.3 °C (97.3 °F), Max:37 °C (98.6 °F)    Recent Labs     11/15/23  0417 23  0359 23  0445   SODIUM 135 134* 133*   POTASSIUM 4.1 3.5* 4.3   CHLORIDE 97 99 99   CO2 24 26 25   BUN 11 8 7*   CREATININE 0.80 0.74 0.70   GLUCOSE 106* 179* 133*   CALCIUM 8.1* 7.8* 8.3*   ASTSGOT 12 17 15   ALTSGPT 5 <5 6   ALBUMIN 2.2* 2.1* 2.3*   TBILIRUBIN 0.3 0.2 0.2   PHOSPHORUS 2.3* 2.2* 3.2   MAGNESIUM 1.8 1.8 2.0     Accu-Checks  No results for input(s): \"POCGLUCOSE\" in the last 72 hours.    Vitals:    23 2351 23 0324 23 0906   BP: 101/80 106/75 108/65 121/70   Weight:       Height:           Intake/Output Summary (Last 24 hours) at 2023 1140  Last data filed at 2023 0910  Gross per 24 hour   Intake 0 ml   Output 2400 ml   Net -2400 ml     Orders Placed This Encounter   Procedures    Diet NPO Restrict to: Strict     Standing Status:   Standing     Number of Occurrences:   1     Order Specific Question:   Diet NPO Restrict to:     Answer:   Strict [1]       TPN for past 72 hours (Show up to 3 orders; newest on the left. Changes between the two most recent orders are indicated.)       Start date and time    2023      TPN Adult Central Line [[id number]] TPN Adult Central Line [[id number]]    Order Status  Active Last Dose in Progress    Last Admin   New Bag at 2023 by Ericka Corey, RCarmenNCarmen    Frequency  TPN DAILY TPN DAILY       Additives    thiamine  100 mg 100 mg    folic acid  1 mg 1 mg       Electrolytes    potassium phosphate  24 mmol 30 mmol    potassium chloride  40 mEq --    potassium acetate  " -- 30 mEq    sodium acetate  150 mEq 80 mEq    sodium chloride  150 mEq 100 mEq    magnesium sulfate  16 mEq 16 mEq    calcium GLUConate  13.95 mEq 13.95 mEq    M.T.E.-4 Tralement  1 mL 1 mL    M.V.I. Adult  10 mL 10 mL       Amino Acids    Clinisol 15%  45 g 45 g       Dextrose    dextrose 70%  110 g 110 g       QS Base    sterile water  1,223.26 mL 1,273.76 mL       Lipid    fat emulsion (soy) 20%  25 g 25 g       Energy Contribution    Proteins  180 kcal 180 kcal    Dextrose  374 kcal 374 kcal    Lipids  250 kcal 250 kcal    Total  804 kcal 804 kcal       Electrolyte Ion Calculated Amount    Sodium  300 mEq 180 mEq    Potassium  75.2 mEq 74 mEq    Calcium  13.95 mEq 13.95 mEq    Magnesium  16 mEq 16 mEq    Aluminum  75 mEq 75 mEq    Phosphate  24 mmol 30 mmol    Chloride  190 mEq 100 mEq    Acetate  188.1 mEq 148.1 mEq    Chloride: Acetate Ratio  1.01 0.675       Other    Total Amino Acid  45 g 45 g    Total Amino Acid/kg  0.62 g/kg 0.62 g/kg    Glucose Infusion Rate  1.06 mg/kg/min 1.06 mg/kg/min    Volume  1,992 mL 1,992 mL    Rate  83 mL/hr 83 mL/hr    Dosing Weight  72.4 kg 72.4 kg    Infusion Site  Central [facility]            TPN Requirements:  Weight Used in TPN Calculation: 72.4 kg (actual body weight)  Total Protein Needs: ~0.6 g/kg/day (50% goal)  Total Caloric Needs: ~11 kcal/kg/day (50% goal)  Total Fluid Needs: ~27 mL/kg/day    Current TPN Formulation:  % of goal: 50%  % kcal as lipids: 28%  Grams protein/kg/day: ~0.6  Non-protein calories: 624 kcals  Kcals/kg/day: ~11  Total daily calories: 804    Comments:  1) Nutritional Plan:  - Remains strict NPO w/ NG to suction.  - Day #2 TPN, PICC line in place.  - Patient tolerating TPN thus far at 50% goal. Continue at current rate of 83 cc/hr continuously over 24 hours. If patient continues to tolerate, will consider increasing to 100% goal on 11/19.  - Continue current macros: 50% carbs, 28% lipids, 22% protein.    2) Labs:  - HypoPhos & HypoK have resolved  after TPN initiation & outside supplementation on 11/16. Continue K contents of ~1 mEq/kg in TPN. Phos contents slightly decreased in TPN. Renal fx stable at baseline.  - HypoNa noted, mild. Na contents in TPN increased to ~NS equivalence. Patient appears euvolemic w/ adequate UO.  - Corrected Ca 9.7. Continue current Ca contents in TPN.  - Mg WNL. Continue current Mg contents in TPN.  - Remains on short-course of IV dexamethasone which may cause some acute hyperglycemia, however blood sugars remains WNL thus far without requiring insulin. Continue current dextrose contents in TPN.    3) Fluids/additives:  - Continue thiamine/FA in TPN for refeeding concerns, day #2/5.  - Continue MVI/trace elements in TPN.  - No indication for acid suppressive therapy.    4) Changes to formulation:  - Phos contents slightly decreased in TPN.  - Chloride/acetate ratio adjusted to ~1:1.    5) Next labs/note:   - Reassess TPN labs on 11/18.  - Pharmacy will continue following.      Karen SheldonD

## 2023-11-17 NOTE — PROCEDURES
Vascular Access Team     Date of Insertion: 11/16/23  Arm Circumference: 26.5  Internal length: 43  External Length: hub  Vein Occupancy %: 34   Reason for PICC: TPN  Labs: WBC 9.1, , BUN 8, Cr 0.74, , INR 1.29 on 11/12/23     Consents confirmed, vessel patency confirmed with ultrasound. Risks and benefits of procedure explained to patient and education regarding central line associated bloodstream infections provided. Questions answered.      PICC placed in LUE per licensed provider order with ultrasound guidance.  5 Fr, double lumen PICC placed in brachial vein after 2 attempt(s). First attempt made to R brachial vein. Catheter advanced 28cm and met with resistance/would not drop into SVC. Pt repositioned multiple times without resolve. Procedure aborted. 2 mL of 1% lidocaine injected intradermally at the insertion site. A 21 gauge microintroducer needle was visualized entering the vein and modified Seldinger technique was used to obtain access to the vein. 43 cm catheter inserted and brisk blood return was observed from each lumen upon aspiration. Line secured at the hub cm marker. TCS stylet removed and observed to be fully intact. Each lumen flushed using pulsatile method without resistance with 10 mL 0.9% normal saline. PICC line secured with Biopatch and Tegaderm.     PICC tip placement location is confirmed by nurse to be in the Superior Vena Cava (SVC) utilizing 3CG technology. PICC line is appropriate for use at this time. Patient experienced some pain, resolved post procedure, without complications.  Patient condition relayed to primary RN or ordering physician via this post procedure note in the EMR.      Ultrasound images uploaded to PACS and viewable in the EMR - yes  Ultrasound imaged printed and placed in paper chart - no     [employer] Power PICC ref # [id number], Lot # [id number], Expiration Date 05/31/2024

## 2023-11-17 NOTE — PROGRESS NOTES
Pt complaint of bladder pain upon assessment.  Bladder scan performed, 728ml resulted.  This RN flushed 50ml sterile saline into hawkins and returned 50ml of fluid drawn back via syringe.  Hawkins subsequently drained 650ml of urine.

## 2023-11-17 NOTE — PROGRESS NOTES
"Pharmacy TPN Note for 2023     63 y.o. male on TPN day # 1      Admission History: Admitted on 2023 for SBO (small bowel obstruction) (Formerly [facility]) [K56.609]    Allergies:   Grass pollen(k-o-r-t-swt dinesh), Seasonal, and Juniper tar     Indication for TPN:   Indication: Bowel Obstruction/Ileus       Clinical Considerations for TPN:   Clinical Considerations Impacting TPN: Re-feeding syndrome           Temp (24hrs), Av.4 °C (97.6 °F), Min:36 °C (96.8 °F), Max:36.8 °C (98.2 °F)    Recent Labs     23  0751 11/15/23  0417 23  0359   SODIUM 134* 135 134*   POTASSIUM 4.1 4.1 3.5*   CHLORIDE 96 97 99   CO2 24 24 26   BUN 12 11 8   CREATININE 0.85 0.80 0.74   GLUCOSE 90 106* 179*   CALCIUM 8.0* 8.1* 7.8*   ASTSGOT 12 12 17   ALTSGPT 6 5 <5   ALBUMIN 2.3* 2.2* 2.1*   TBILIRUBIN 0.3 0.3 0.2   PHOSPHORUS  --  2.3* 2.2*   MAGNESIUM  --  1.8 1.8     Accu-Checks  No results for input(s): \"POCGLUCOSE\" in the last 72 hours.    Vitals:    11/15/23 1952 23 0319 23 0726 23 1257   BP: 96/64 105/71 116/79 117/78   Weight:       Height:           Intake/Output Summary (Last 24 hours) at 2023 1608  Last data filed at 2023 1303  Gross per 24 hour   Intake 2294.17 ml   Output 1430 ml   Net 864.17 ml       Orders Placed This Encounter   Procedures    Diet NPO Restrict to: Strict     Standing Status:   Standing     Number of Occurrences:   1     Order Specific Question:   Diet NPO Restrict to:     Answer:   Strict [1]       TPN for past 72 hours (Show up to 3 orders; newest on the left.)       Start date and time    2023      TPN Adult Central Line [[id number]]    Order Status  Active    Frequency  TPN DAILY       Additives    thiamine  100 mg    folic acid  1 mg       Electrolytes    potassium phosphate  30 mmol    potassium acetate  30 mEq    sodium acetate  80 mEq    sodium chloride  100 mEq    magnesium sulfate  16 mEq    calcium GLUConate  13.95 mEq    M.T.E.-4 Tralement  1 mL    " M.V.I. Adult  10 mL       Amino Acids    Clinisol 15%  45 g       Dextrose    dextrose 70%  110 g       QS Base    sterile water  1,273.76 mL       Lipid    fat emulsion (soy) 20%  25 g       Energy Contribution    Proteins  180 kcal    Dextrose  374 kcal    Lipids  250 kcal    Total  804 kcal       Electrolyte Ion Calculated Amount    Sodium  180 mEq    Potassium  74 mEq    Calcium  13.95 mEq    Magnesium  16 mEq    Aluminum  75 mEq    Phosphate  30 mmol    Chloride  100 mEq    Acetate  148.1 mEq    Chloride: Acetate Ratio  0.675       Other    Total Amino Acid  45 g    Total Amino Acid/kg  0.62 g/kg    Glucose Infusion Rate  1.06 mg/kg/min    Volume  1,992 mL    Rate  83 mL/hr    Dosing Weight  72.4 kg    Infusion Site  Central            TPN Requirements:  Weight Used in TPN Calculation: 72.4 kg (159 lb 9.8 oz)  Total Protein Needs (g/kg): 0.62 g/kg  Total Caloric Needs (kcal): 804 kcal  Total Fluid Needs (mL): 1992 mL     Current TPN Formulation:  % of goal: 50  % kcal as lipids: 28%  Grams protein/k.62  Non-protein calories: 624  Kcals/k  Total daily calories: 804    Comments:  1) Nutritional Plan: Patient strict NPO. NG to suction. Once PICC line placed, initiating TPN tonight () at 50% goal to be run continuously at 83 ml/hr over 24 hrs .   2) Labs: Baseline labs with mild hypokalemia, hypophosphatemia. Was replaced appropriately this AM. Renal function stable. Appears euvolemic.    3) Fluids/additives: Maintenance fluids to stop once TPN is initiated   4) Changes to formulation: Today is day 1 of TPN. Will re-evaluate any changes tomorrow.   5) Next labs/note: CMP, mg, and phosphorous ordered for .       Jessica Durham, KarenD

## 2023-11-18 NOTE — PROGRESS NOTES
[facility] Medicine Daily Progress Note    Date of Service  11/18/2023    Chief Complaint  Nghia Coffman Jr is a 63 y.o. male admitted 11/11/2023 with small bowel obstruction.    Hospital Course  Nghia Coffman Jr is a 63 y.o. male who presented 11/11/2023 with past medical history of metastatic renal cell carcinoma who comes into the hospital for abdominal distention for the past 4 days.  His last bowel movement was also 4 days ago.  He is currently not passing gas.  He has had repeated episodes of vomiting.  3 weeks ago patient had C. difficile where he was taking oral vancomycin and has only 3 to 4 days left.  In August he had a left-sided nephrectomy and patient states since then his bowels has not been working correctly.  Patient had only 1 dose of Keytruda and is currently not on chemotherapy.     Patient was transferred [facility] where he had a CT scan that found metastatic liver disease, ascites and a mechanical small bowel obstruction with the transition in the distal ileum central upper pelvis.     WBC 11.2, hemoglobin 8.6, platelets 601, lipase 51, creatinine 1.2.         Interval Problem Update  11/13/23  Patient continues to have NG tube in place with significant output.  Continue to receive rectal vancomycin.  Patient is status post left nephrectomy on 8/2/2023.  Sodium mildly low at 132.  Hemoglobin stable at 7.2.  White count stable at 8.6.    11/14 patient in bed, no fever or chills, family at bedside, passing small flatus, and some bm as per nurse staff, surgery notes reviewed, SBFT today. Continue vanco for c diff.   11/15 patient in bed, wife at bedside, no new complains, ngt in placed, discussed regarding SBFT results, I have asked oncologist Dr Pantoja for evaluation and recommendations, patient is alert and oriented, discussed regarding TPN and picc line he would like to wait until discussion with oncologist.   11/16 patient in bed, c/o back pain no controlled  mostly at night, I have adjusted his iv dilaudid, I am monitoring for side effect including but not limited to respiratory depression/AMS. Discussed with patient, wife, discussed with oncologist, TPN and picc line ordered for today. All questions answered.   11/17 in bed, family at bedside, no new complains, pain is better controlled, patient is passing gas and had a BM. Surgical oncology eval today and at this time they are not recommending surgical intervention.   11/18 in bed, TPN running, had 2 bm's, still requiring iv dilaudid for pain management, sips of water today ok with surgery. Discussed with patient patient's wife and daughter.     I have discussed this patient's plan of care and discharge plan at IDT rounds today with Case Management, Nursing, Nursing leadership, and other members of the IDT team.    Consultants/Specialty  general surgery  Oncologist.     Code Status  Full Code    Disposition  The patient is not medically cleared for discharge to home or a post-acute facility.      I have placed the appropriate orders for post-discharge needs.    Review of Systems  Review of Systems   Constitutional:  Negative for chills and fever.   Respiratory:  Negative for cough and shortness of breath.    Cardiovascular:  Negative for chest pain and palpitations.   Gastrointestinal:  Positive for nausea. Negative for abdominal pain and vomiting.   Genitourinary:  Negative for dysuria and hematuria.   Musculoskeletal:  Negative for joint pain and myalgias.   Neurological:  Negative for dizziness and headaches.        Physical Exam  Temp:  [36.4 °C (97.5 °F)-37 °C (98.6 °F)] 36.6 °C (97.9 °F)  Pulse:  [] 92  Resp:  [20-22] 22  BP: ()/(66-79) 114/68  SpO2:  [93 %-98 %] 94 %    Physical Exam  Vitals and nursing note reviewed.   Constitutional:       General: He is not in acute distress.     Appearance: He is ill-appearing.   HENT:      Head: Normocephalic and atraumatic.      Nose:      Comments:  Nasogastric tube in place     Mouth/Throat:      Mouth: Mucous membranes are moist.      Pharynx: Oropharynx is clear. No oropharyngeal exudate.   Eyes:      General: No scleral icterus.        Right eye: No discharge.         Left eye: No discharge.      Conjunctiva/sclera: Conjunctivae normal.   Cardiovascular:      Rate and Rhythm: Normal rate and regular rhythm.      Pulses: Normal pulses.      Heart sounds: Normal heart sounds. No murmur heard.  Pulmonary:      Effort: Pulmonary effort is normal. No respiratory distress.      Breath sounds: Normal breath sounds.   Abdominal:      General: Abdomen is flat. Bowel sounds are normal. There is distension.      Palpations: Abdomen is soft.      Tenderness: There is abdominal tenderness.   Musculoskeletal:         General: No swelling.      Cervical back: Neck supple. No tenderness.      Right lower leg: No edema.      Left lower leg: No edema.   Skin:     General: Skin is warm and dry.      Coloration: Skin is not pale.   Neurological:      Mental Status: He is alert and oriented to person, place, and time. Mental status is at baseline.      Motor: No weakness.   Psychiatric:         Thought Content: Thought content normal.         Judgment: Judgment normal.         Fluids    Intake/Output Summary (Last 24 hours) at 11/18/2023 1145  Last data filed at 11/18/2023 0936  Gross per 24 hour   Intake 991.93 ml   Output 1825 ml   Net -833.07 ml         Laboratory  Recent Labs     11/16/23  0359 11/16/23  0807 11/17/23  0445   WBC  --  9.1 12.6*   RBC  --  2.74* 2.89*   HEMOGLOBIN 7.1* 7.1* 7.7*   HEMATOCRIT 23.5* 23.9* 25.5*   MCV  --  87.2 88.2   MCH  --  25.9* 26.6*   MCHC  --  29.7* 30.2*   RDW  --  49.7 50.2*   PLATELETCT  --  324 352   MPV  --  8.9* 9.5       Recent Labs     11/16/23  0359 11/17/23  0445   SODIUM 134* 133*   POTASSIUM 3.5* 4.3   CHLORIDE 99 99   CO2 26 25   GLUCOSE 179* 133*   BUN 8 7*   CREATININE 0.74 0.70   CALCIUM 7.8* 8.3*                          Imaging  [id number] VIEW   Final Result      1.  Contrast in the colon and rectum.   2.  Broad sweep of the enteric tube in keeping with the patient's known large infradiaphragmatic mass                  IR-PICC LINE PLACEMENT W/ GUIDANCE > AGE 5   Final Result                  Ultrasound-guided PICC placement performed by qualified nursing staff as    above.          DX-SMALL BOWEL SERIES   Final Result      1. High-grade distal mechanical small bowel obstruction, likely in the ileum.   2. No complete bowel obstruction.      I, Dr. Del Piña, discussed the results of this examination directly by phone with Dr. Montanez on 11/14/2023 at 1627 hours.      DX-ABDOMEN FOR TUBE PLACEMENT   Final Result      1.  Enteric tube has been adjusted and now projects over the gastric body in satisfactory position.      DX-ABDOMEN FOR TUBE PLACEMENT   Final Result      1.  Presumed enteric tube projects over the upper mid thorax likely in the thoracic esophagus above the level of the aster. It is  recommended this be removed and reinserted or readjusted.      2.  Findings were originally called to the patient's nurse on 11/12/2023 at 1:23 AM however she was unavailable due to assisting another patient. Findings were discussed with the patient's nurse Haja, 1:45 AM on 11/12/2023.      OUTSIDE IMAGES-CT ABDOMEN /PELVIS   Final Result           Assessment/Plan  * SBO (small bowel obstruction) (HCC)- (present on admission)  Assessment & Plan  Mechanical both small bowel obstruction  Recent C. difficile colitis  Keep n.p.o.  NG tube  IV hydration  Pain control  Surgery has been consulted    11/13/23  Continue NG tube to suction  Strict n.p.o.  Continue IV fluids LR at 100 mils per hour      SBFT positive for bowel obstruction likely ileus. Discussed with gen surgery .  Continue supportive treatment  Picc line ordered for TPN.     Will continue monitoring supportive treatment.   Patient had bm     Having bm now,  per surgery ok to start sips of water.   Daily suppository  Kub reviewed.       Recurrent Clostridioides difficile infection  Assessment & Plan  Continue oral vancomycin  Start IV Flagyl    11/13/23  Unable to take oral vancomycin  Continue vancomycin enemas and IV metronidazole    Discussed with pharmacist on 11/14 patient has completed treatment for c diff.     Normocytic anemia  Assessment & Plan  Iron panel and reticulocyte count    11/13/23  Likely anemia of chronic inflammatory state  Continue to treat underlying cause including C. difficile colitis  Hb stable. Monitoring.     Severe protein-calorie malnutrition (HCC)- (present on admission)  Assessment & Plan  Start IV thiamine  Monitor electrolytes    Patient will probably require TPN, discussed with patient and wife today, he would like to wait to discuss with oncologist.     Picc line ordered for tpn today. 11/16    Continue TPN.     Malignant neoplasm of left kidney (HCC)- (present on admission)  Assessment & Plan  History of nephrectomy in August  Metastatic to lungs, liver, lymph nodes  Palliative consult  Discussed with Dr Pantoja.     Continue supportive treatment. No indication for inpatient chemo at this time per onco patient needs to discharge from hospital in order to continue treatment.         VTE prophylaxis: heparin    I have performed a physical exam and reviewed and updated ROS and Plan today (11/18/2023). In review of yesterday's note (11/17/2023), there are no changes except as documented above.      My total time spent caring for the patient on the day of the encounter was 38 minutes.   This does not include time spent on separately billable procedures/tests.

## 2023-11-18 NOTE — CARE PLAN
Problem: Knowledge Deficit - Standard  Goal: Patient and family/care givers will demonstrate understanding of plan of care, disease process/condition, diagnostic tests and medications  Outcome: Progressing     Problem: Fall Risk  Goal: Patient will remain free from falls  Outcome: Progressing     Problem: Pain - Standard  Goal: Alleviation of pain or a reduction in pain to the patient’s comfort goal  Outcome: Progressing   The patient is Stable - Low risk of patient condition declining or worsening    Shift Goals  Clinical Goals: Pain Control, FSBG Checks  Patient Goals: Pain Control  Family Goals: N/A    Progress made toward(s) clinical / shift goals:  Educated patient on plan of care this shift, Patient Free from falls at this time, Pain medicated per MAR     Patient is not progressing towards the following goals:

## 2023-11-18 NOTE — PROGRESS NOTES
"  DATE: 11/18/2023    Hospital Day 7  large retroperitoneal tumor with obstruction .    INTERVAL EVENTS:  NG tube with decreased output.  Resolved obstructive symptoms with two large bowel movements yesterday.  Abdominal xray yesterday with no residual contrast in the small bowel.    Will trial clamping NG tube with sips of tariq & continue to trend abdominal exams.  Highly recommend daily suppositories and bowel regimen to optimize bowel function.    REVIEW OF SYSTEMS:  Review of systems is remarkable for the following back pain. The remainder of the comprehensive ROS is negative with the exception of the aforementioned HPI, PMH, and PSH bullets in accordance with CMS guideline.    PHYSICAL EXAMINATION:  Vital Signs: /66   Pulse (!) 101   Temp 36.7 °C (98.1 °F) (Temporal)   Resp 20   Ht 1.854 m (6' 1\")   Wt 72.4 kg (159 lb 9.8 oz)   SpO2 93%     Physical Exam  Constitutional:       General: He is not in acute distress.     Appearance: He is not toxic-appearing or diaphoretic.   Pulmonary:      Effort: Pulmonary effort is normal. No respiratory distress.   Abdominal:      General: Bowel sounds are decreased. There is no distension.      Palpations: Abdomen is soft.      Tenderness: There is no abdominal tenderness. There is no guarding.   Neurological:      Mental Status: He is alert and oriented to person, place, and time. Mental status is at baseline.       LABORATORY VALUES:  Recent Labs     11/16/23 0359 11/16/23 0807 11/17/23 0445   WBC  --  9.1 12.6*   RBC  --  2.74* 2.89*   HEMOGLOBIN 7.1* 7.1* 7.7*   HEMATOCRIT 23.5* 23.9* 25.5*   MCV  --  87.2 88.2   MCH  --  25.9* 26.6*   MCHC  --  29.7* 30.2*   RDW  --  49.7 50.2*   PLATELETCT  --  324 352   MPV  --  8.9* 9.5     Recent Labs     11/16/23 0359 11/17/23 0445   SODIUM 134* 133*   POTASSIUM 3.5* 4.3   CHLORIDE 99 99   CO2 26 25   GLUCOSE 179* 133*   BUN 8 7*   CREATININE 0.74 0.70   CALCIUM 7.8* 8.3*     Recent Labs     11/16/23 0359 " 11/17/23  0445   ASTSGOT 17 15   ALTSGPT <5 6   TBILIRUBIN 0.2 0.2   ALKPHOSPHAT 118* 128*   GLOBULIN 3.0 3.3     IMAGING:  IV-DLEGGNI-1 VIEW   Final Result      1.  Contrast in the colon and rectum.   2.  Broad sweep of the enteric tube in keeping with the patient's known large infradiaphragmatic mass                  IR-PICC LINE PLACEMENT W/ GUIDANCE > AGE 5   Final Result                  Ultrasound-guided PICC placement performed by qualified nursing staff as    above.          DX-SMALL BOWEL SERIES   Final Result      1. High-grade distal mechanical small bowel obstruction, likely in the ileum.   2. No complete bowel obstruction.      I, Dr. Del Piña, discussed the results of this examination directly by phone with Dr. Montanez on 11/14/2023 at 1627 hours.      DX-ABDOMEN FOR TUBE PLACEMENT   Final Result      1.  Enteric tube has been adjusted and now projects over the gastric body in satisfactory position.      DX-ABDOMEN FOR TUBE PLACEMENT   Final Result      1.  Presumed enteric tube projects over the upper mid thorax likely in the thoracic esophagus above the level of the aster. It is  recommended this be removed and reinserted or readjusted.      2.  Findings were originally called to the patient's nurse on 11/12/2023 at 1:23 AM however she was unavailable due to assisting another patient. Findings were discussed with the patient's nurse Haja, 1:45 AM on 11/12/2023.      OUTSIDE IMAGES-CT ABDOMEN /PELVIS   Final Result        ASSESSMENT AND PLAN:  * SBO (small bowel obstruction) (HCC)- (present on admission)  Assessment & Plan  Recent history of left nephrectomy & metastatic renal cell carcinoma.  CT with retroperitoneal mass causing possible obstruction.  11/14 Small bowel series with obstruction.  11/17 Abdominal xray without residual contrast in the small bowel.  11/18 Two large bowel movements with resolution of obstructive symptoms. Clamp NG tube and trial sips of water.    - Clamp NG  tube and trial sips of water  - Recommend daily bowel regimen with daily suppositories    _______________________________       JELENA Luu

## 2023-11-18 NOTE — PROGRESS NOTES
"Received report from previous shift RN at 1900.  Assessment complete.  A&O x 4. Patient calls appropriately.  Patient ambulates with x1  hand-held assist.   Patient has 7/10 pain. Pain managed with prescribed medications per MAR.  Denies N&V. Pt NPO at this time. R NGT in place to LCS  Skin per flowhheets.  + void, + flatus, + BM on 11/17.  Patient denies SOB on room air.  SCD's refused.  /70   Pulse 93   Temp 37 °C (98.6 °F) (Temporal)   Resp 20   Ht 1.854 m (6' 1\") Comment: 's license  Wt 72.4 kg (159 lb 9.8 oz)   SpO2 96%   BMI 21.06 kg/m²     Patient pleasant and cooperative throughout assessment.  Reviewed plan of care with patient, pt verbalizes understanding. Call light and personal belongings with in reach. Hourly rounding in place. All needs met at this time.    "

## 2023-11-18 NOTE — CARE PLAN
The patient is Stable - Low risk of patient condition declining or worsening    Shift Goals  Clinical Goals: NGT Maintenance; TPN Infusion; Bowel Function  Patient Goals: Pain Control; Rest  Family Goals: N/A    Progress made toward(s) clinical / shift goals:  Patient medicated per MAR. Non-pharmacologic comfort measures implemented. Safety discussed. Education provided. Ambulation and repositioning encouraged.     Problem: Knowledge Deficit - Standard  Goal: Patient and family/care givers will demonstrate understanding of plan of care, disease process/condition, diagnostic tests and medications  Outcome: Progressing     Problem: Pain - Standard  Goal: Alleviation of pain or a reduction in pain to the patient’s comfort goal  Outcome: Progressing     Problem: Gastrointestinal Irritability  Goal: Nausea and vomiting will be absent or improve  Outcome: Progressing     Problem: Communication  Goal: The ability to communicate needs accurately and effectively will improve  Outcome: Progressing

## 2023-11-18 NOTE — PROGRESS NOTES
Assumed care of patient at 0645. Bedside report received. Assessment complete.  AA&Ox4. Denies CP/SOB.  L Hai SUSIE to Low Continuous Wall Suction   Reporting 7/10 pain. Medicated per MAR.  Educated patient regarding pharmacologic and non pharmacologic modalities for pain management.  Skin per flowsheets  NPO Strict at this time. Denies N/V.  + void via Juarez Catheter. + BM. Last BM 11/18  Pt ambulates SBA w FWW.  All needs met at this time. Call light within reach. Pt calls appropriately. Bed low and locked, non skid socks in place. Hourly rounding in place.

## 2023-11-19 NOTE — PROGRESS NOTES
"Pharmacy TPN Note for 2023     63 y.o. male on TPN Day # 3      Admission History: Admitted on 2023 for SBO (small bowel obstruction) ([facility]) [K56.609]    Allergies: Grass pollen(k-o-r-t-swt dinesh), Seasonal, and Juniper tar     Indication for TPN: Indication: Bowel Obstruction/Ileus    Clinical Considerations for TPN: Clinical Considerations Impacting TPN: Not Applicable      Temp (24hrs), Av.7 °C (98.1 °F), Min:36.3 °C (97.3 °F), Max:37 °C (98.6 °F)  Recent Labs     23  0359 23  0445 23  1226   SODIUM 134* 133* 131*   POTASSIUM 3.5* 4.3 5.1   CHLORIDE 99 99 95*   CO2 26 25 26   BUN 8 7* 11   CREATININE 0.74 0.70 0.83   GLUCOSE 179* 133* 151*   CALCIUM 7.8* 8.3* 8.5   ASTSGOT 17 15 16   ALTSGPT <5 6 6   ALBUMIN 2.1* 2.3* 2.6*   TBILIRUBIN 0.2 0.2 0.2   PHOSPHORUS 2.2* 3.2 3.3   MAGNESIUM 1.8 2.0 2.1     Accu-Checks  No results for input(s): \"POCGLUCOSE\" in the last 72 hours.  Vitals:    23 2350 23 0324 23 0930 23 1648   BP: 108/71 103/66 114/68 117/74   Weight:       Height:         Intake/Output Summary (Last 24 hours) at 2023 1649  Last data filed at 2023 0936  Gross per 24 hour   Intake 991.93 ml   Output 750 ml   Net 241.93 ml     Orders Placed This Encounter   Procedures    Diet NPO Restrict to: Ice Chips (sips of water okay)     Standing Status:   Standing     Number of Occurrences:   1     Order Specific Question:   Diet NPO Restrict to:     Answer:   Ice Chips [2]     Comments:   sips of water okay     TPN for past 72 hours (Show up to 3 orders; newest on the left. Changes between the two most recent orders are indicated.)       Start date and time    2023      TPN Adult Central Line [[id number]] TPN Adult Central Line [[id number]] TPN Adult Central Line [[id number]]    Order Status  Active Last Dose in Progress Completed    Last Admin   New Bag at 2023 by Ericka Corey R.N. New Bag " at 11/16/2023 2039 by Ericka Corey R.N.    Frequency  TPN DAILY TPN DAILY TPN DAILY       Additives    thiamine  100 mg 100 mg 100 mg    folic acid  -- 1 mg 1 mg       Electrolytes    potassium phosphate  30 mmol 24 mmol 30 mmol    potassium chloride  15 mEq 40 mEq --    potassium acetate  -- -- 30 mEq    sodium acetate  130 mEq 150 mEq 80 mEq    sodium chloride  200 mEq 150 mEq 100 mEq    magnesium sulfate  16 mEq 16 mEq 16 mEq    calcium GLUConate  15 mEq 13.95 mEq 13.95 mEq    M.T.E.-4 Tralement  1 mL 1 mL 1 mL    M.V.I. Adult  10 mL 10 mL 10 mL       Amino Acids    Clinisol 15%  125 g 45 g 45 g       Dextrose    dextrose 70%  295 g 110 g 110 g       QS Base    sterile water  474.57 mL 1,223.26 mL 1,273.76 mL       Lipid    fat emulsion (soy) 20%  50 g 25 g 25 g       Energy Contribution    Proteins  500 kcal 180 kcal 180 kcal    Dextrose  1,003 kcal 374 kcal 374 kcal    Lipids  500 kcal 250 kcal 250 kcal    Total  2,003 kcal 804 kcal 804 kcal       Electrolyte Ion Calculated Amount    Sodium  330 mEq 300 mEq 180 mEq    Potassium  59 mEq 75.2 mEq 74 mEq    Calcium  15 mEq 13.95 mEq 13.95 mEq    Magnesium  16 mEq 16 mEq 16 mEq    Aluminum  208.33 mEq 75 mEq 75 mEq    Phosphate  30 mmol 24 mmol 30 mmol    Chloride  215 mEq 190 mEq 100 mEq    Acetate  235.83 mEq 188.1 mEq 148.1 mEq    Chloride: Acetate Ratio  0.91 1.01 0.675       Other    Total Amino Acid  125 g 45 g 45 g    Total Amino Acid/kg  1.73 g/kg 0.62 g/kg 0.62 g/kg    Glucose Infusion Rate  2.83 mg/kg/min 1.06 mg/kg/min 1.06 mg/kg/min    Volume  2,160 mL 1,992 mL 1,992 mL    Rate  90 mL/hr 83 mL/hr 83 mL/hr    Dosing Weight  72.4 kg 72.4 kg 72.4 kg    Infusion Site  [facility]            TPN Requirements:  Weight Used in TPN Calculation: 75 kg (165 lb 5.5 oz)  Total Protein Needs (g/kg):  (1.5 - 2 g/kg)  Total Caloric Needs (kcal):  (~1,900 - 2,200 kcals/day)  Total Fluid Needs (mL):  (25 - 30 mL/kg/day)     Current TPN Formulation:  %  of goal: 100  % kcal as lipids: 25  Grams protein/k.67  Non-protein calories: 1,503  Kcals/k  Total daily calories: 2,003    Comments   Diet Progression: NPO, sips with water. NG to LIS with clamping trials.   TPN Progression: Continue 24 hour CIVI.   Acid Suppression: n/a   Fluid Status: Good UOP, - 1,500 mL/day reported, ~0.8 mL/kg/hr. Renal indices trending upward.   Total Output documented in EPIC -1,900 mL   Slight increase in TPN to 90 cc/hr, + 2,160 mL/day.   Additional: Continue thiamine 100 mg. Remove folic acid. Other electrolyte changes noted above.     Macronutrients  25% Protein  50% Carbohydrates  25% Fat, Triglycerides 122 mg/dL on 11/15/23.     CMP/Mg/Phos scheduled every Monday and Thursday per TPN protocol.     Torie Maier, PharmD

## 2023-11-19 NOTE — PROGRESS NOTES
Assumed care of patient at 0645. Bedside report received. Assessment complete.  AA&Ox4. Denies CP/SOB.  L Nare NG to Low Continuous Wall Suction, Gastric Aspirate Present  Reporting 7/10 pain. Medicated per MAR.  Educated patient regarding pharmacologic and non pharmacologic modalities for pain management.  Skin per flowsheets  NPO Ice Chips/ Sips. Denies N/V.  + void via Juarez Catheter. Last BM 11/18   Pt ambulates SBA w FWW.  All needs met at this time. Call light within reach. Pt calls appropriately. Bed low and locked, non skid socks in place. Hourly rounding in place.

## 2023-11-19 NOTE — ASSESSMENT & PLAN NOTE
Recent history of left nephrectomy & metastatic renal cell carcinoma.  CT with retroperitoneal mass causing obstruction.  11/14 Small bowel series with obstruction.  11/17 Abdominal xray without residual contrast in the small bowel.  11/18 Two large bowel movements with resolution of obstructive symptoms. Clamp NG tube and trial sips of water.  11/19 Failed clamping trial, continue NG tube to suction. Abdominal xray with residual contrast in colon.  - Recommended PEG tube placement for gastric decompression.  11/21 IR PEG tube placement.  11/23 D/C NG tube.

## 2023-11-19 NOTE — DIETARY
Nutrition Services Brief Update:    Problem: Nutritional:  Goal: Nutrition support tolerated and meeting greater than 85% of estimated needs  Outcome: Met    TPN providing 100% of goal: 2003 kcals and 125 grams of protein per day.    RD to follow.

## 2023-11-19 NOTE — PROGRESS NOTES
[facility] Medicine Daily Progress Note    Date of Service  11/19/2023    Chief Complaint  Nghia Coffman Jr is a 63 y.o. male admitted 11/11/2023 with small bowel obstruction.    Hospital Course  Nghia Coffman Jr is a 63 y.o. male who presented 11/11/2023 with past medical history of metastatic renal cell carcinoma who comes into the hospital for abdominal distention for the past 4 days.  His last bowel movement was also 4 days ago.  He is currently not passing gas.  He has had repeated episodes of vomiting.  3 weeks ago patient had C. difficile where he was taking oral vancomycin and has only 3 to 4 days left.  In August he had a left-sided nephrectomy and patient states since then his bowels has not been working correctly.  Patient had only 1 dose of Keytruda and is currently not on chemotherapy.     Patient was transferred from [facility] where he had a CT scan that found metastatic liver disease, ascites and a mechanical small bowel obstruction with the transition in the distal ileum central upper pelvis.     WBC 11.2, hemoglobin 8.6, platelets 601, lipase 51, creatinine 1.2.         Interval Problem Update  11/13/23  Patient continues to have NG tube in place with significant output.  Continue to receive rectal vancomycin.  Patient is status post left nephrectomy on 8/2/2023.  Sodium mildly low at 132.  Hemoglobin stable at 7.2.  White count stable at 8.6.    11/14 patient in bed, no fever or chills, family at bedside, passing small flatus, and some bm as per nurse staff, surgery notes reviewed, SBFT today. Continue vanco for c diff.   11/15 patient in bed, wife at bedside, no new complains, ngt in placed, discussed regarding SBFT results, I have asked oncologist Dr Pantoja for evaluation and recommendations, patient is alert and oriented, discussed regarding TPN and picc line he would like to wait until discussion with oncologist.   11/16 patient in bed, c/o back pain no controlled  mostly at night, I have adjusted his iv dilaudid, I am monitoring for side effect including but not limited to respiratory depression/AMS. Discussed with patient, wife, discussed with oncologist, TPN and picc line ordered for today. All questions answered.   11/17 in bed, family at bedside, no new complains, pain is better controlled, patient is passing gas and had a BM. Surgical oncology eval today and at this time they are not recommending surgical intervention.   11/18 in bed, TPN running, had 2 bm's, still requiring iv dilaudid for pain management, sips of water today ok with surgery. Discussed with patient patient's wife and daughter.   11/19 patient is in bed, c/o pain, ngt in place, still having high output, passing some flatus, wife at bedside, discussed with surgery team. Continue monitoring.     Discussed with pharmacist will add iv protonix today.       I have discussed this patient's plan of care and discharge plan at IDT rounds today with Case Management, Nursing, Nursing leadership, and other members of the IDT team.    Consultants/Specialty  general surgery  Oncologist.     Code Status  Full Code    Disposition  The patient is not medically cleared for discharge to home or a post-acute facility.      I have placed the appropriate orders for post-discharge needs.    Review of Systems  Review of Systems   Constitutional:  Negative for chills and fever.   Respiratory:  Negative for cough and shortness of breath.    Cardiovascular:  Negative for chest pain and palpitations.   Gastrointestinal:  Positive for nausea. Negative for abdominal pain and vomiting.   Genitourinary:  Negative for dysuria and hematuria.   Musculoskeletal:  Negative for joint pain and myalgias.   Neurological:  Negative for dizziness and headaches.        Physical Exam  Temp:  [36.3 °C (97.3 °F)-36.8 °C (98.2 °F)] 36.6 °C (97.9 °F)  Pulse:  [] 104  Resp:  [17-22] 17  BP: ()/(62-79) 123/79  SpO2:  [93 %-95 %] 93  %    Physical Exam  Vitals and nursing note reviewed.   Constitutional:       General: He is not in acute distress.     Appearance: He is ill-appearing.   HENT:      Head: Normocephalic and atraumatic.      Nose:      Comments: Nasogastric tube in place     Mouth/Throat:      Mouth: Mucous membranes are moist.      Pharynx: Oropharynx is clear. No oropharyngeal exudate.   Eyes:      General: No scleral icterus.        Right eye: No discharge.         Left eye: No discharge.      Conjunctiva/sclera: Conjunctivae normal.   Cardiovascular:      Rate and Rhythm: Normal rate and regular rhythm.      Pulses: Normal pulses.      Heart sounds: Normal heart sounds. No murmur heard.  Pulmonary:      Effort: Pulmonary effort is normal. No respiratory distress.      Breath sounds: Normal breath sounds.   Abdominal:      General: Abdomen is flat. Bowel sounds are normal. There is distension.      Palpations: Abdomen is soft.      Tenderness: There is abdominal tenderness.   Musculoskeletal:         General: No swelling.      Cervical back: Neck supple. No tenderness.      Right lower leg: No edema.      Left lower leg: No edema.   Skin:     General: Skin is warm and dry.      Coloration: Skin is not pale.   Neurological:      Mental Status: He is alert and oriented to person, place, and time. Mental status is at baseline.      Motor: No weakness.   Psychiatric:         Thought Content: Thought content normal.         Judgment: Judgment normal.         Fluids    Intake/Output Summary (Last 24 hours) at 11/19/2023 1223  Last data filed at 11/19/2023 0805  Gross per 24 hour   Intake 0 ml   Output 3550 ml   Net -3550 ml         Laboratory  Recent Labs     11/17/23  0445 11/18/23  1208 11/19/23  0745   WBC 12.6* 15.2* 14.4*   RBC 2.89* 2.94* 2.63*   HEMOGLOBIN 7.7* 7.7* 7.0*   HEMATOCRIT 25.5* 25.5* 23.2*   MCV 88.2 86.7 88.2   MCH 26.6* 26.2* 26.6*   MCHC 30.2* 30.2* 30.2*   RDW 50.2* 50.7* 52.6*   PLATELETCT 352 348 325   MPV 9.5  9.5 10.2       Recent Labs     11/17/23  0445 11/18/23  1226 11/19/23  0745   SODIUM 133* 131* 133*   POTASSIUM 4.3 5.1 4.7   CHLORIDE 99 95* 96   CO2 25 26 26   GLUCOSE 133* 151* 159*   BUN 7* 11 15   CREATININE 0.70 0.83 0.83   CALCIUM 8.3* 8.5 8.3*                         Imaging  [id number] VIEW   Final Result      No acute process.      [id number] VIEW   Final Result      1.  Contrast in the colon and rectum.   2.  Broad sweep of the enteric tube in keeping with the patient's known large infradiaphragmatic mass                  IR-PICC LINE PLACEMENT W/ GUIDANCE > AGE 5   Final Result                  Ultrasound-guided PICC placement performed by qualified nursing staff as    above.          DX-SMALL BOWEL SERIES   Final Result      1. High-grade distal mechanical small bowel obstruction, likely in the ileum.   2. No complete bowel obstruction.      I, Dr. Del Piña, discussed the results of this examination directly by phone with Dr. Montanez on 11/14/2023 at 1627 hours.      DX-ABDOMEN FOR TUBE PLACEMENT   Final Result      1.  Enteric tube has been adjusted and now projects over the gastric body in satisfactory position.      DX-ABDOMEN FOR TUBE PLACEMENT   Final Result      1.  Presumed enteric tube projects over the upper mid thorax likely in the thoracic esophagus above the level of the aster. It is  recommended this be removed and reinserted or readjusted.      2.  Findings were originally called to the patient's nurse on 11/12/2023 at 1:23 AM however she was unavailable due to assisting another patient. Findings were discussed with the patient's nurse Haja, 1:45 AM on 11/12/2023.      OUTSIDE IMAGES-CT ABDOMEN /PELVIS   Final Result           Assessment/Plan  * SBO (small bowel obstruction) ([facility])- (present on admission)  Assessment & Plan  Mechanical both small bowel obstruction  Recent C. difficile colitis  Keep n.p.o.  NG tube  IV hydration  Pain control  Surgery has been  consulted    11/13/23  Continue NG tube to suction  Strict n.p.o.  Continue IV fluids LR at 100 mils per hour      SBFT positive for bowel obstruction likely ileus. Discussed with gen surgery .  Continue supportive treatment  Picc line ordered for TPN.     Will continue monitoring supportive treatment.   Patient had bm     Failed cld trial.   High output from ngt  Discussed with surgery patient might need PEG tube placement.   Kub today my read: still having contrast in colon.   Continue bowel regiment.       Recurrent Clostridioides difficile infection  Assessment & Plan  Continue oral vancomycin  Start IV Flagyl    11/13/23  Unable to take oral vancomycin  Continue vancomycin enemas and IV metronidazole    Discussed with pharmacist on 11/14 patient has completed treatment for c diff.     Normocytic anemia  Assessment & Plan  Iron panel and reticulocyte count    11/13/23  Likely anemia of chronic inflammatory state  Continue to treat underlying cause including C. difficile colitis  Hb stable. Monitoring.     Severe protein-calorie malnutrition (HCC)- (present on admission)  Assessment & Plan  Start IV thiamine  Monitor electrolytes    Patient will probably require TPN, discussed with patient and wife today, he would like to wait to discuss with oncologist.     Picc line ordered for tpn today. 11/16    Continue TPN, monitoring labs and for signs of complications.     Malignant neoplasm of left kidney (HCC)- (present on admission)  Assessment & Plan  History of nephrectomy in August  Metastatic to lungs, liver, lymph nodes  Palliative consult  Discussed with Dr Pantoja.     Continue supportive treatment. No indication for inpatient chemo at this time per onco patient needs to discharge from hospital in order to continue treatment.         VTE prophylaxis: heparin    I have performed a physical exam and reviewed and updated ROS and Plan today (11/19/2023). In review of yesterday's note (11/18/2023), there are no  changes except as documented above.      My total time spent caring for the patient on the day of the encounter was 52 minutes.   This does not include time spent on separately billable procedures/tests.

## 2023-11-19 NOTE — PROGRESS NOTES
Patient Complaints of Nausea w NG Tube Clamped  NP Notified   Patient Placed Back on LCWS    [de-identified] : Renew imetrex and odanstetron-Vivo

## 2023-11-19 NOTE — PROGRESS NOTES
"  DATE: 11/19/2023    Hospital Day 8  large retroperitoneal tumor with obstruction .    INTERVAL EVENTS:  Failed NG tube clamping trial yesterday.  NG tube placed back to suction yesterday evening and 1600 mL of documented output noted.  Abdominal xray this AM with residual contrast in ascending colon from small bowel series 5 days ago.  Dr. Dhaliwal discussed PEG tube placement with patient and family for symptom decompression.   Patient and family would like to discuss this option with their oncologist tomorrow before finalizing a decision.    REVIEW OF SYSTEMS:  Review of systems is remarkable for the following back pain. The remainder of the comprehensive ROS is negative with the exception of the aforementioned HPI, PMH, and PSH bullets in accordance with CMS guideline.    PHYSICAL EXAMINATION:  Vital Signs: /79   Pulse (!) 104   Temp 36.6 °C (97.9 °F) (Temporal)   Resp 17   Ht 1.854 m (6' 1\")   Wt 72.4 kg (159 lb 9.8 oz)   SpO2 93%     Physical Exam  Constitutional:       General: He is not in acute distress.     Appearance: He is not toxic-appearing or diaphoretic.   HENT:      Nose:      Comments: NG tube intact to continuous suction.  Pulmonary:      Effort: Pulmonary effort is normal. No respiratory distress.   Abdominal:      General: Bowel sounds are decreased. There is distension (mild).      Palpations: Abdomen is soft.      Tenderness: There is no abdominal tenderness. There is no guarding.   Skin:     Coloration: Skin is pale.   Neurological:      Mental Status: He is alert and oriented to person, place, and time. Mental status is at baseline.       LABORATORY VALUES:  Recent Labs     11/17/23  0445 11/18/23  1208 11/19/23  0745   WBC 12.6* 15.2* 14.4*   RBC 2.89* 2.94* 2.63*   HEMOGLOBIN 7.7* 7.7* 7.0*   HEMATOCRIT 25.5* 25.5* 23.2*   MCV 88.2 86.7 88.2   MCH 26.6* 26.2* 26.6*   MCHC 30.2* 30.2* 30.2*   RDW 50.2* 50.7* 52.6*   PLATELETCT 352 348 325   MPV 9.5 9.5 10.2     Recent Labs     " 11/17/23  0445 11/18/23  1226 11/19/23  0745   SODIUM 133* 131* 133*   POTASSIUM 4.3 5.1 4.7   CHLORIDE 99 95* 96   CO2 25 26 26   GLUCOSE 133* 151* 159*   BUN 7* 11 15   CREATININE 0.70 0.83 0.83   CALCIUM 8.3* 8.5 8.3*     Recent Labs     11/17/23  0445 11/18/23  1226   ASTSGOT 15 16   ALTSGPT 6 6   TBILIRUBIN 0.2 0.2   ALKPHOSPHAT 128* 149*   GLOBULIN 3.3 3.1     IMAGING:  TR-[id number]-9 VIEW   Final Result      No acute process.      UK-GZAOMEF-8 VIEW   Final Result      1.  Contrast in the colon and rectum.   2.  Broad sweep of the enteric tube in keeping with the patient's known large infradiaphragmatic mass                  IR-PICC LINE PLACEMENT W/ GUIDANCE > AGE 5   Final Result                  Ultrasound-guided PICC placement performed by qualified nursing staff as    above.          DX-SMALL BOWEL SERIES   Final Result      1. High-grade distal mechanical small bowel obstruction, likely in the ileum.   2. No complete bowel obstruction.      I, Dr. Del Piña, discussed the results of this examination directly by phone with Dr. Montanez on 11/14/2023 at 1627 hours.      DX-ABDOMEN FOR TUBE PLACEMENT   Final Result      1.  Enteric tube has been adjusted and now projects over the gastric body in satisfactory position.      DX-ABDOMEN FOR TUBE PLACEMENT   Final Result      1.  Presumed enteric tube projects over the upper mid thorax likely in the thoracic esophagus above the level of the aster. It is  recommended this be removed and reinserted or readjusted.      2.  Findings were originally called to the patient's nurse on 11/12/2023 at 1:23 AM however she was unavailable due to assisting another patient. Findings were discussed with the patient's nurse Haja, 1:45 AM on 11/12/2023.      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  CT with retroperitoneal mass causing obstruction.  11/14 Small bowel series with obstruction.  11/17 Abdominal xray without residual contrast in the small bowel.  11/18 Two large bowel movements with resolution of obstructive symptoms. Clamp NG tube and trial sips of water.  11/19 Failed clamping trial, continue NG tube to suction. Abdominal xray with residual contrast in colon.  Recommend PEG tube placement for gastric decompression, patient and family will follow up with their oncologist before making a decision.    - Continue NG tube to low continuous suction  - Recommend continuing bowel regimen with daily suppositories and/or enemas  - [facility] will follow up tomorrow to discuss possible PEG tube placement for gastric decompression    _______________________________       CHERELLE Luu.

## 2023-11-19 NOTE — PROGRESS NOTES
"Received report from previous shift RN at 1900.  Assessment complete.  A&O x 4. Patient calls appropriately.  Patient ambulates with x1 assist and FWW.   Patient has 7/10 pain. Pain managed with prescribed medications per MAR.  Denies N&V. Pt NPO, tolerating sips/ice chips. R NGT in place to LCS.  Skin per flowsheets.  + void, + flatus, + BM on 11/18.  Patient denies SOB on RA.  SCD's refused.  BP 99/62   Pulse (!) 108   Temp 36.7 °C (98.1 °F) (Temporal)   Resp (!) 22   Ht 1.854 m (6' 1\") Comment: 's license  Wt 72.4 kg (159 lb 9.8 oz)   SpO2 95%   BMI 21.06 kg/m²     Patient pleasant and cooperative throughout assessment.  Reviewed plan of care with patient, pt verbalizes understanding. Call light and personal belongings with in reach. Hourly rounding in place. All needs met at this time.    "

## 2023-11-19 NOTE — PROGRESS NOTES
"Pharmacy TPN Note for 2023     63 y.o. male on TPN Day # 4      Admission History: Admitted on 2023 for SBO (small bowel obstruction) ([facility]) [K56.609]    Allergies: Grass pollen(k-o-r-t-swt dinesh), Seasonal, and Juniper tar     Indication for TPN: Indication: Bowel Obstruction/Ileus    Clinical Considerations for TPN: Clinical Considerations Impacting TPN: Not Applicable      Temp (24hrs), Av.6 °C (97.9 °F), Min:36.3 °C (97.3 °F), Max:36.8 °C (98.2 °F)    Recent Labs     23  0445 23  1226 23  0745   SODIUM 133* 131* 133*   POTASSIUM 4.3 5.1 4.7   CHLORIDE 99 95* 96   CO2 25 26 26   BUN 7* 11 15   CREATININE 0.70 0.83 0.83   GLUCOSE 133* 151* 159*   CALCIUM 8.3* 8.5 8.3*   ASTSGOT 15 16  --    ALTSGPT 6 6  --    ALBUMIN 2.3* 2.6*  --    TBILIRUBIN 0.2 0.2  --    PHOSPHORUS 3.2 3.3 3.1   MAGNESIUM 2.0 2.1 2.1     Accu-Checks  No results for input(s): \"POCGLUCOSE\" in the last 72 hours.    Vitals:    23 1648 23 1912 23 0618 23 0805   BP: 117/74 99/62 116/78 123/79   Weight:       Height:           Intake/Output Summary (Last 24 hours) at 2023 1205  Last data filed at 2023 0805  Gross per 24 hour   Intake 0 ml   Output 3550 ml   Net -3550 ml       Orders Placed This Encounter   Procedures    Diet NPO Restrict to: Ice Chips (sips of water okay)     Standing Status:   Standing     Number of Occurrences:   1     Order Specific Question:   Diet NPO Restrict to:     Answer:   Ice Chips [2]     Comments:   sips of water okay       TPN for past 72 hours (Show up to 3 orders; newest on the left. Changes between the two most recent orders are indicated.)       Start date and time    2023      TPN Adult Central Line [[id number]] TPN Adult Central Line [[id number]] TPN Adult Central Line [[id number]]    Order Status  Active Last Dose in Progress Completed    Last Admin   New Bag at 2023 2107 by Ericka Corey R.N. " New Bag at 11/17/2023 2059 by Ericka Corey R.N.    Frequency  TPN DAILY TPN DAILY TPN DAILY       Additives    thiamine  100 mg 100 mg 100 mg    folic acid  -- -- 1 mg       Electrolytes    potassium phosphate  36 mmol 30 mmol 24 mmol    potassium chloride  15 mEq 15 mEq 40 mEq    sodium acetate  130 mEq 130 mEq 150 mEq    sodium chloride  200 mEq 200 mEq 150 mEq    magnesium sulfate  16 mEq 16 mEq 16 mEq    calcium GLUConate  15 mEq 15 mEq 13.95 mEq    M.T.E.-4 Tralement  1 mL 1 mL 1 mL    M.V.I. Adult  10 mL 10 mL 10 mL       Amino Acids    Clinisol 15%  125 g 125 g 45 g       Dextrose    dextrose 70%  295 g 295 g 110 g       QS Base    sterile water  472.57 mL 474.57 mL 1,223.26 mL       Lipid    fat emulsion (soy) 20%  50 g 50 g 25 g       Energy Contribution    Proteins  500 kcal 500 kcal 180 kcal    Dextrose  1,003 kcal 1,003 kcal 374 kcal    Lipids  500 kcal 500 kcal 250 kcal    Total  2,003 kcal 2,003 kcal 804 kcal       Electrolyte Ion Calculated Amount    Sodium  330 mEq 330 mEq 300 mEq    Potassium  67.8 mEq 59 mEq 75.2 mEq    Calcium  15 mEq 15 mEq 13.95 mEq    Magnesium  16 mEq 16 mEq 16 mEq    Aluminum  208.33 mEq 208.33 mEq 75 mEq    Phosphate  36 mmol 30 mmol 24 mmol    Chloride  215 mEq 215 mEq 190 mEq    Acetate  235.83 mEq 235.83 mEq 188.1 mEq    Chloride: Acetate Ratio  0.91 0.91 1.01       Other    Total Amino Acid  125 g 125 g 45 g    Total Amino Acid/kg  1.73 g/kg 1.73 g/kg 0.62 g/kg    Glucose Infusion Rate  2.83 mg/kg/min 2.83 mg/kg/min 1.06 mg/kg/min    Volume  2,160 mL 2,160 mL 1,992 mL    Rate  90 mL/hr 90 mL/hr 83 mL/hr    Dosing Weight  72.4 kg 72.4 kg 72.4 kg    In[facility]  [facility]          TPN Requirements:  Weight Used in TPN Calculation: 75 kg (165 lb 5.5 oz)  Total Protein Needs (g/kg):  (1.5 - 2 g/kg)  Total Caloric Needs (kcal):  (~1,900 - 2,200 kcals/day)  Total Fluid Needs (mL):  (25 - 35 mL/kg/day)     Current TPN Formulation:  % of goal: 100  % kcal  as lipids: 25  Grams protein/k.67  Non-protein calories: 1,503  Kcals/k  Total daily calories: 2,003     Comments   Diet Progression: NPO, sips with water. Failed NG clamping trials. Resume NG to LIS. PEG tube placement pending.   TPN Progression: Continue 24 hour CIVI at 90 cc/hr, total volume 2,160 mL (30 mL/kg/day) for now.   Acid Suppression: Initiated Protonix 40 mg IV daily.   Fluid Status: Good UOP, - 2,400 mL/day reported, > 1 mL/kg/hr. Renal indices with slight trend upward yesterday, SCr remains the same today.  NG output documented - 1,600 mL   Additional: Continue thiamine 100 mg. Folic acid removed 2/2 cancer diagnosis. Electrolytes stable WNL, only slight increase in Kphos to maintain Phos > 3 while on TPN. Mag at goal of > 2. Ionized calcium at lower end of goal range 1.1 mmol/L - continue upper end dosing 15 mEq Calcium Gluconate.      Macronutrients  25% Protein; Pre-albumin on .   50% Carbohydrates; Blood glucose just over 150 mg/dL with AM labs. Kcals just increased to goal yesterday, anticipate normalization.   25% Fat, Triglycerides 122 mg/dL on 11/15/23. Repeat lab tomorrow.      CMP/Mg/Phos scheduled every Monday and Thursday per TPN protocol.      Torie Maier, PharmD

## 2023-11-19 NOTE — CARE PLAN
The patient is Stable - Low risk of patient condition declining or worsening    Shift Goals  Clinical Goals: Pain Control; NGT Maintenance; TPN Infusion  Patient Goals: Pain Control; Rest  Family Goals: N/A    Progress made toward(s) clinical / shift goals:  Patient medicated per MAR. Non-pharmacologic comfort measures implemented. Safety discussed. Education provided. Ambulation and repositioning encouraged.     Problem: Knowledge Deficit - Standard  Goal: Patient and family/care givers will demonstrate understanding of plan of care, disease process/condition, diagnostic tests and medications  Outcome: Progressing     Problem: Pain - Standard  Goal: Alleviation of pain or a reduction in pain to the patient’s comfort goal  Outcome: Progressing     Problem: Gastrointestinal Irritability  Goal: Nausea and vomiting will be absent or improve  Outcome: Progressing     Problem: Communication  Goal: The ability to communicate needs accurately and effectively will improve  Outcome: Progressing

## 2023-11-19 NOTE — CARE PLAN
Problem: Knowledge Deficit - Standard  Goal: Patient and family/care givers will demonstrate understanding of plan of care, disease process/condition, diagnostic tests and medications  Outcome: Progressing     Problem: Fall Risk  Goal: Patient will remain free from falls  Outcome: Progressing     Problem: Pain - Standard  Goal: Alleviation of pain or a reduction in pain to the patient’s comfort goal  Outcome: Progressing   The patient is Stable - Low risk of patient condition declining or worsening    Shift Goals  Clinical Goals: Pain Control  Patient Goals: Pain Control  Family Goals: N/A    Progress made toward(s) clinical / shift goals:  Educated patient on plan of care this shift, Patient free from falls at this time, Pain medicated per MAR     Patient is not progressing towards the following goals:

## 2023-11-20 NOTE — PROGRESS NOTES
"  DATE: 11/20/2023    Hospital Day 9  large retroperitoneal tumor with obstruction .    INTERVAL EVENTS:  Patient reports interest in PEG tube placement.  Surgical oncology to follow up with patient today.  He wishes to return home as soon as possible.    REVIEW OF SYSTEMS:  Review of systems is remarkable for the following fatigue, bilateral shoulder pain, & back pain. The remainder of the comprehensive ROS is negative with the exception of the aforementioned HPI, PMH, and PSH bullets in accordance with CMS guideline.    PHYSICAL EXAMINATION:  Vital Signs: /78   Pulse (!) 106   Temp 37 °C (98.6 °F) (Temporal)   Resp 17   Ht 1.854 m (6' 1\")   Wt 72.4 kg (159 lb 9.8 oz)   SpO2 96%     Physical Exam  Constitutional:       General: He is not in acute distress.     Appearance: He is not toxic-appearing or diaphoretic.   HENT:      Nose:      Comments: NG tube intact to continuous suction.  Pulmonary:      Effort: Pulmonary effort is normal. No respiratory distress.   Abdominal:      General: Bowel sounds are decreased. There is distension (mild).      Palpations: Abdomen is soft.      Tenderness: There is no abdominal tenderness. There is no guarding.   Skin:     Coloration: Skin is pale.   Neurological:      Mental Status: He is alert and oriented to person, place, and time. Mental status is at baseline.       LABORATORY VALUES:  Recent Labs     11/18/23  1208 11/19/23  0745   WBC 15.2* 14.4*   RBC 2.94* 2.63*   HEMOGLOBIN 7.7* 7.0*   HEMATOCRIT 25.5* 23.2*   MCV 86.7 88.2   MCH 26.2* 26.6*   MCHC 30.2* 30.2*   RDW 50.7* 52.6*   PLATELETCT 348 325   MPV 9.5 10.2     Recent Labs     11/18/23  1226 11/19/23  0745   SODIUM 131* 133*   POTASSIUM 5.1 4.7   CHLORIDE 95* 96   CO2 26 26   GLUCOSE 151* 159*   BUN 11 15   CREATININE 0.83 0.83   CALCIUM 8.5 8.3*     Recent Labs     11/18/23  1226   ASTSGOT 16   ALTSGPT 6   TBILIRUBIN 0.2   ALKPHOSPHAT 149*   GLOBULIN 3.1     IMAGING:  [id number]-8 VIEW   Final Result "      No acute process.      FN-[id number]-2 VIEW   Final Result      1.  Contrast in the colon and rectum.   2.  Broad sweep of the enteric tube in keeping with the patient's known large infradiaphragmatic mass                  IR-PICC LINE PLACEMENT W/ GUIDANCE > AGE 5   Final Result                  Ultrasound-guided PICC placement performed by qualified nursing staff as    above.          DX-SMALL BOWEL SERIES   Final Result      1. High-grade distal mechanical small bowel obstruction, likely in the ileum.   2. No complete bowel obstruction.      I, Dr. Del Piña, discussed the results of this examination directly by phone with Dr. Montanez on 11/14/2023 at 1627 hours.      DX-ABDOMEN FOR TUBE PLACEMENT   Final Result      1.  Enteric tube has been adjusted and now projects over the gastric body in satisfactory position.      DX-ABDOMEN FOR TUBE PLACEMENT   Final Result      1.  Presumed enteric tube projects over the upper mid thorax likely in the thoracic esophagus above the level of the aster. It is  recommended this be removed and reinserted or readjusted.      2.  Findings were originally called to the patient's nurse on 11/12/2023 at 1:23 AM however she was unavailable due to assisting another patient. Findings were discussed with the patient's nurse Haja, 1:45 AM on 11/12/2023.      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  CT with retroperitoneal mass causing obstruction.  11/14 Small bowel series with obstruction.  11/17 Abdominal xray without residual contrast in the small bowel.  11/18 Two large bowel movements with resolution of obstructive symptoms. Clamp NG tube and trial sips of water.  11/19 Failed clamping trial, continue NG tube to suction.  Abdominal xray with residual contrast in colon.  Recommend PEG tube placement for gastric decompression.      - Continue NG tube to low continuous suction  - Recommend continuing aggressive daily bowel regimen   - PEG tube arrangements to be discuss with surgical oncology after follow up today      _______________________________       CHERELLE Luu.

## 2023-11-20 NOTE — PROGRESS NOTES
[facility] Medicine Daily Progress Note    Date of Service  11/20/2023    Chief Complaint  Nghia Coffman Jr is a 63 y.o. male admitted 11/11/2023 with small bowel obstruction.    Hospital Course  Nghia Coffman Jr is a 63 y.o. male who presented 11/11/2023 with past medical history of metastatic renal cell carcinoma who comes into the hospital for abdominal distention for the past 4 days.  His last bowel movement was also 4 days ago.  He is currently not passing gas.  He has had repeated episodes of vomiting.  3 weeks ago patient had C. difficile where he was taking oral vancomycin and has only 3 to 4 days left.  In August he had a left-sided nephrectomy and patient states since then his bowels has not been working correctly.  Patient had only 1 dose of Keytruda and is currently not on chemotherapy.     Patient was transferred from [facility] where he had a CT scan that found metastatic liver disease, ascites and a mechanical small bowel obstruction with the transition in the distal ileum central upper pelvis.     WBC 11.2, hemoglobin 8.6, platelets 601, lipase 51, creatinine 1.2.         Interval Problem Update  11/13/23  Patient continues to have NG tube in place with significant output.  Continue to receive rectal vancomycin.  Patient is status post left nephrectomy on 8/2/2023.  Sodium mildly low at 132.  Hemoglobin stable at 7.2.  White count stable at 8.6.    11/14 patient in bed, no fever or chills, family at bedside, passing small flatus, and some bm as per nurse staff, surgery notes reviewed, SBFT today. Continue vanco for c diff.   11/15 patient in bed, wife at bedside, no new complains, ngt in placed, discussed regarding SBFT results, I have asked oncologist Dr aPntoja for evaluation and recommendations, patient is alert and oriented, discussed regarding TPN and picc line he would like to wait until discussion with oncologist.   11/16 patient in bed, c/o back pain no controlled  mostly at night, I have adjusted his iv dilaudid, I am monitoring for side effect including but not limited to respiratory depression/AMS. Discussed with patient, wife, discussed with oncologist, TPN and picc line ordered for today. All questions answered.   11/17 in bed, family at bedside, no new complains, pain is better controlled, patient is passing gas and had a BM. Surgical oncology eval today and at this time they are not recommending surgical intervention.   11/18 in bed, TPN running, had 2 bm's, still requiring iv dilaudid for pain management, sips of water today ok with surgery. Discussed with patient patient's wife and daughter.   11/19 patient is in bed, c/o pain, ngt in place, still having high output, passing some flatus, wife at bedside, discussed with surgery team. Continue monitoring.   11/20 in bed, family at bedside, c/o abdominal/back and shoulder pain, I have started patient on PCA pump with dilaudid, notes from surgery reviewed, decrease bowel sounds. At least 300 cc from ngt.           I have discussed this patient's plan of care and discharge plan at IDT rounds today with Case Management, Nursing, Nursing leadership, and other members of the IDT team.    Consultants/Specialty  general surgery  Oncologist.     Code Status  Full Code    Disposition  The patient is not medically cleared for discharge to home or a post-acute facility.      I have placed the appropriate orders for post-discharge needs.    Review of Systems  Review of Systems   Constitutional:  Negative for chills and fever.   Respiratory:  Negative for cough and shortness of breath.    Cardiovascular:  Negative for chest pain and palpitations.   Gastrointestinal:  Positive for abdominal pain and nausea. Negative for vomiting.   Genitourinary:  Negative for dysuria and hematuria.   Musculoskeletal:  Positive for back pain. Negative for joint pain and myalgias.   Neurological:  Negative for dizziness and headaches.        Physical  Exam  Temp:  [36.3 °C (97.3 °F)-37 °C (98.6 °F)] 36.7 °C (98.1 °F)  Pulse:  [] 90  Resp:  [16-18] 16  BP: (112-126)/(63-83) 112/63  SpO2:  [93 %-96 %] 93 %    Physical Exam  Vitals and nursing note reviewed.   Constitutional:       General: He is not in acute distress.     Appearance: He is ill-appearing.   HENT:      Head: Normocephalic and atraumatic.      Nose:      Comments: Nasogastric tube in place     Mouth/Throat:      Mouth: Mucous membranes are moist.      Pharynx: Oropharynx is clear. No oropharyngeal exudate.   Eyes:      General: No scleral icterus.        Right eye: No discharge.         Left eye: No discharge.      Conjunctiva/sclera: Conjunctivae normal.   Cardiovascular:      Rate and Rhythm: Normal rate and regular rhythm.      Pulses: Normal pulses.      Heart sounds: Normal heart sounds. No murmur heard.  Pulmonary:      Effort: Pulmonary effort is normal. No respiratory distress.      Breath sounds: Normal breath sounds.   Abdominal:      General: Abdomen is flat. Bowel sounds are decreased. There is distension.      Palpations: Abdomen is soft.      Tenderness: There is abdominal tenderness.   Musculoskeletal:         General: No swelling.      Cervical back: Neck supple. No tenderness.      Right lower leg: No edema.      Left lower leg: No edema.   Skin:     General: Skin is warm and dry.      Coloration: Skin is not pale.   Neurological:      Mental Status: He is alert and oriented to person, place, and time. Mental status is at baseline.      Motor: No weakness.   Psychiatric:         Thought Content: Thought content normal.         Judgment: Judgment normal.         Fluids    Intake/Output Summary (Last 24 hours) at 11/20/2023 1213  Last data filed at 11/20/2023 0830  Gross per 24 hour   Intake 0 ml   Output 3100 ml   Net -3100 ml         Laboratory  Recent Labs     11/18/23  1208 11/19/23  0745   WBC 15.2* 14.4*   RBC 2.94* 2.63*   HEMOGLOBIN 7.7* 7.0*   HEMATOCRIT 25.5* 23.2*   MCV  86.7 88.2   MCH 26.2* 26.6*   MCHC 30.2* 30.2*   RDW 50.7* 52.6*   PLATELETCT 348 325   MPV 9.5 10.2       Recent Labs     11/18/23  1226 11/19/23  0745   SODIUM 131* 133*   POTASSIUM 5.1 4.7   CHLORIDE 95* 96   CO2 26 26   GLUCOSE 151* 159*   BUN 11 15   CREATININE 0.83 0.83   CALCIUM 8.5 8.3*                         Imaging  DX-ABDOMEN FOR TUBE PLACEMENT   Final Result      1.  NG tube tip projects at the stomach.   2.  Small left pleural effusion with adjacent airspace disease.      CR-[id number]-2 VIEW   Final Result      No acute process.      BO-CHUHGCQ-1 VIEW   Final Result      1.  Contrast in the colon and rectum.   2.  Broad sweep of the enteric tube in keeping with the patient's known large infradiaphragmatic mass                  IR-PICC LINE PLACEMENT W/ GUIDANCE > AGE 5   Final Result                  Ultrasound-guided PICC placement performed by qualified nursing staff as    above.          DX-SMALL BOWEL SERIES   Final Result      1. High-grade distal mechanical small bowel obstruction, likely in the ileum.   2. No complete bowel obstruction.      I, Dr. Del Piña, discussed the results of this examination directly by phone with Dr. Montanez on 11/14/2023 at 1627 hours.      DX-ABDOMEN FOR TUBE PLACEMENT   Final Result      1.  Enteric tube has been adjusted and now projects over the gastric body in satisfactory position.      DX-ABDOMEN FOR TUBE PLACEMENT   Final Result      1.  Presumed enteric tube projects over the upper mid thorax likely in the thoracic esophagus above the level of the aster. It is  recommended this be removed and reinserted or readjusted.      2.  Findings were originally called to the patient's nurse on 11/12/2023 at 1:23 AM however she was unavailable due to assisting another patient. Findings were discussed with the patient's nurse Haja, 1:45 AM on 11/12/2023.      OUTSIDE IMAGES-CT ABDOMEN /PELVIS   Final Result           Assessment/Plan  * SBO (small bowel  obstruction) (HCC)- (present on admission)  Assessment & Plan  Mechanical both small bowel obstruction  Recent C. difficile colitis  Keep n.p.o.  NG tube  IV hydration  Pain control  Surgery has been consulted    11/13/23  Continue NG tube to suction  Strict n.p.o.  Continue IV fluids LR at 100 mils per hour      SBFT positive for bowel obstruction likely ileus. Discussed with gen surgery .  Continue supportive treatment  Picc line ordered for TPN.     Will continue monitoring supportive treatment.   Patient had bm     Failed cld trial.   High output from ngt  Discussed with surgery patient might need PEG tube placement.   Kub today my read: still having contrast in colon.   Continue bowel regiment.     In bed, having more pain, patient already on 1 mg iv dilaudid q3hrs, I have started patient on PCA pump, discussed with pharmacist.   Kub reviewed today for ngt position, still showed contrast but not visualized lower abdomen.      Recurrent Clostridioides difficile infection  Assessment & Plan  Continue oral vancomycin  Start IV Flagyl    11/13/23  Unable to take oral vancomycin  Continue vancomycin enemas and IV metronidazole    Discussed with pharmacist on 11/14 patient has completed treatment for c diff.     Normocytic anemia  Assessment & Plan  Iron panel and reticulocyte count    11/13/23  Likely anemia of chronic inflammatory state  Continue to treat underlying cause including C. difficile colitis  Hb stable. Monitoring.     Severe protein-calorie malnutrition (HCC)- (present on admission)  Assessment & Plan  Start IV thiamine  Monitor electrolytes    Patient will probably require TPN, discussed with patient and wife today, he would like to wait to discuss with oncologist.     Picc line ordered for tpn today. 11/16    Continue TPN, monitoring labs and for signs of complications.     Malignant neoplasm of left kidney (HCC)- (present on admission)  Assessment & Plan  History of nephrectomy in August  Metastatic  to lungs, liver, lymph nodes  Palliative consult  Discussed with Dr Pantoja.     Continue supportive treatment. No indication for inpatient chemo at this time per onco patient needs to discharge from hospital in order to continue treatment.         VTE prophylaxis: heparin    I have performed a physical exam and reviewed and updated ROS and Plan today (11/20/2023). In review of yesterday's note (11/19/2023), there are no changes except as documented above.      My total time spent caring for the patient on the day of the encounter was 53 minutes.   This does not include time spent on separately billable procedures/tests.      Dilaudid PCA pump  monitoring for side effects/complications.

## 2023-11-20 NOTE — CARE PLAN
Problem: Knowledge Deficit - Standard  Goal: Patient and family/care givers will demonstrate understanding of plan of care, disease process/condition, diagnostic tests and medications  Outcome: Progressing     Problem: Fall Risk  Goal: Patient will remain free from falls  Outcome: Progressing     Problem: Pain - Standard  Goal: Alleviation of pain or a reduction in pain to the patient’s comfort goal  Outcome: Progressing   The patient is Stable - Low risk of patient condition declining or worsening    Shift Goals  Clinical Goals: Pain Control  Patient Goals: Pain Control  Family Goals: N/A    Progress made toward(s) clinical / shift goals:  Pain Controlled per MAR, Educated patient on plan of care this shift, Free from falls at this time    Patient is not progressing towards the following goals:

## 2023-11-20 NOTE — PROGRESS NOTES
Assumed care of patient at 0645. Bedside report received. Assessment complete.  AA&Ox4. Denies CP/SOB.  NG Clamped pending Placement Verification   Reporting 8/10 pain. Medicated per MAR.   Educated patient regarding pharmacologic and non pharmacologic modalities for pain management.  Skin per flowsheets  NPO at this time. Denies N/V.  + void via Juarez Catheter. Last BM 11/19  Pt ambulates SBA w FWW.  All needs met at this time. Call light within reach. Pt calls appropriately. Bed low and locked, non skid socks in place. Hourly rounding in place.

## 2023-11-20 NOTE — THERAPY
Occupational Therapy  Daily Treatment     Patient Name: Nghia Coffman Jr  Age:  63 y.o., Sex:  male  Medical Record #: [id number]  Today's Date: 11/20/2023    Precautions: Fall Risk  Comments: NG Tube with suction, C-Diff, PCA    Assessment    Pt seen for OT tx. Pt politely declined OOB ADLs at this time due to pain. Pt was recently placed on a PCA and reports that he is continuously hitting the button, but states that he feels like he is playing catch up with his pain (RN notified). Pt states that he has been getting up intermittently during the day to complete OOB ADLs with nursing staff. Pt provided education on importance of OOB ADLs to prevent further complications, but continued to decline activity at this time. Discussed with pt and pt's spouse what their plan is for DC. Ultimately they would like to DC to their home in [place], but they know that there are no home health agencies up there. They can DC to their daughters home in [place] which is a tri-level home. Pt's wife reports that the pt can stay on the main floor in a hospital bed or could stay upstairs or downstairs. If pt were to stay upstairs or downstairs he would be able to stay on that level and have access to a bathroom. Pt is ultimately waiting to discuss further POC with oncologist as it was also suggested that pt get  a PEG tube. Pt's spouse stated that they believe the oncologist is supposed to come by today (11/20) for further discussion.     Plan    Treatment Plan Status: Continue Current Treatment Plan  Type of Treatment: Self Care / Activities of Daily Living, Therapeutic Exercises, Therapeutic Activity  Treatment Frequency: 3 Times per Week  Treatment Duration: Until Therapy Goals Met    DC Equipment Recommendations: Unable to determine at this time  Discharge Recommendations: Recommend post-acute placement for additional occupational therapy services prior to discharge home (If pt declines post-acute placement, he would  "benefit from Home Health for further OT services)    Subjective    \"I don't think I am going to be getting up right now, I am in a lot of pain.\"            "

## 2023-11-20 NOTE — PROGRESS NOTES
Bedside report received, assessment completed    A&O x  4, pt calls appropriately  Mobility: Up with x1 assist and FWW  Fall Risk Assessment: Moderate, bed alarm on, door notifications in use  Pain Assessment / Reassessment completed, medication provided per MAR  Diet: NPO, sips and chips ok  LDA:   IV Access: L upper double PICC, CDI/ flushed/ Infusing TPN at 90 mL/hr  NG: R nare, LCS    GI/: + void, + flatus, 11/19 BM  DVT Prophylaxis: Unfractionated Heparin, SCD's refused    Reviewed plan of care with patient, bed in lowest position and locked, pt resting comfortably now, call light within reach, all needs met at this time. Interventions will be executed per plan of care

## 2023-11-20 NOTE — CARE PLAN
The patient is Stable - Low risk of patient condition declining or worsening    Shift Goals  Clinical Goals: Pain control and TPN  Patient Goals: Pain control and rest  Family Goals: N/A    Progress made toward(s) clinical / shift goals:  Pain controlled per MAR. TPN started at 2000. Pt denies N/V. Pt slept intermittently throughout shift.     Problem: Pain - Standard  Goal: Alleviation of pain or a reduction in pain to the patient’s comfort goal  Outcome: Progressing     Problem: Nutrition  Goal: Patient's nutritional and fluid intake will be adequate or improve  Outcome: Progressing

## 2023-11-21 PROBLEM — D72.829 LEUKOCYTOSIS: Status: ACTIVE | Noted: 2023-01-01

## 2023-11-21 PROBLEM — E83.42 HYPOMAGNESEMIA: Status: ACTIVE | Noted: 2023-01-01

## 2023-11-21 PROBLEM — C64.9 METASTATIC RENAL CELL CARCINOMA (HCC): Status: ACTIVE | Noted: 2023-01-01

## 2023-11-21 PROBLEM — E87.6 HYPOKALEMIA: Status: ACTIVE | Noted: 2023-01-01

## 2023-11-21 PROBLEM — E87.1 HYPONATREMIA: Status: ACTIVE | Noted: 2023-01-01

## 2023-11-21 PROBLEM — R73.9 HYPERGLYCEMIA: Status: ACTIVE | Noted: 2023-01-01

## 2023-11-21 NOTE — PROGRESS NOTES
Hospital Medicine Daily Progress Note    Date of Service  11/21/2023    Chief Complaint  Nghia Coffman Jr is a 63 y.o. male admitted 11/11/2023 with small bowel obstruction    Hospital Course  Admitted with small bowel obstruction, known history of metastatic renal cell carcinoma, underwent left nephrectomy on 8/2/2023.  Surgery was consulted on the case.  Patient was placed on n.p.o., NG tube was placed.  He failed trial of clamping NGT and clear liquids. PICC line was placed for TPN.  Oncology was informed of the admission.  Palliative care was also consulted for pain management.  He was also recently diagnosed with stage of difficile infection, he was on oral vancomycin.  He finished a course with vancomycin enemas and IV Flagyl as he was unable to take the oral vancomycin.    Interval Problem Update  SBO - plan for G tube placement  Hyperglycemia - -201  Leukocytosis - Tmax 98.6  Low magnesium    Updates given and plan of care discussed with patient's spouse was at bedside.    I have discussed this patient's plan of care and discharge plan at IDT rounds today with Case Management, Nursing, Nursing leadership, and other members of the IDT team.    Consultants/Specialty  general surgery, oncology, palliative care, and surgery oncology    Code Status  Full Code    Disposition  The patient is not medically cleared for discharge to home or a post-acute facility.  Anticipate discharge to: home with organized home healthcare and close outpatient follow-up    I have placed the appropriate orders for post-discharge needs.    Review of Systems  Review of Systems   Constitutional:  Positive for malaise/fatigue. Negative for chills, diaphoresis and fever.   HENT:  Negative for congestion, hearing loss and sore throat.    Eyes:  Negative for blurred vision.   Respiratory:  Negative for cough, shortness of breath and wheezing.    Cardiovascular:  Negative for chest pain, palpitations and leg swelling.    Gastrointestinal:  Positive for abdominal pain and nausea. Negative for diarrhea, heartburn and vomiting.   Genitourinary:  Negative for dysuria, flank pain and hematuria.   Musculoskeletal:  Negative for back pain, joint pain, myalgias and neck pain.   Skin:  Negative for rash.   Neurological:  Positive for weakness. Negative for dizziness, sensory change, speech change, focal weakness and headaches.   Psychiatric/Behavioral:  The patient is nervous/anxious.         Physical Exam  Temp:  [36.5 °C (97.7 °F)-37 °C (98.6 °F)] 36.7 °C (98.1 °F)  Pulse:  [] 100  Resp:  [14-20] 14  BP: (106-124)/(66-81) 116/79  SpO2:  [93 %-100 %] 100 %    Physical Exam  Vitals and nursing note reviewed.   HENT:      Head: Normocephalic and atraumatic.      Nose: No congestion.      Mouth/Throat:      Mouth: Mucous membranes are moist.   Eyes:      Extraocular Movements: Extraocular movements intact.      Conjunctiva/sclera: Conjunctivae normal.   Cardiovascular:      Rate and Rhythm: Normal rate and regular rhythm.   Pulmonary:      Effort: Pulmonary effort is normal.      Breath sounds: Normal breath sounds.   Abdominal:      General: There is distension.      Tenderness: There is abdominal tenderness. There is no guarding or rebound.   Musculoskeletal:      Cervical back: No tenderness.      Right lower leg: No edema.      Left lower leg: No edema.   Skin:     General: Skin is warm and dry.   Neurological:      General: No focal deficit present.      Mental Status: He is alert and oriented to person, place, and time.      Cranial Nerves: No cranial nerve deficit.   Psychiatric:         Mood and Affect: Mood is anxious.         Fluids    Intake/Output Summary (Last 24 hours) at 11/21/2023 1428  Last data filed at 11/21/2023 1138  Gross per 24 hour   Intake 1014.02 ml   Output 2580 ml   Net -1565.98 ml       Laboratory  Recent Labs     11/19/23  0745 11/20/23  1327 11/21/23  1124   WBC 14.4* 17.4* 17.6*   RBC 2.63* 2.75* 2.79*    HEMOGLOBIN 7.0* 7.3* 7.6*   HEMATOCRIT 23.2* 23.8* 24.8*   MCV 88.2 86.5 88.9   MCH 26.6* 26.5* 27.2   MCHC 30.2* 30.7* 30.6*   RDW 52.6* 52.7* 54.3*   PLATELETCT 325 314 330   MPV 10.2 10.7 10.2     Recent Labs     11/19/23  0745 11/20/23  1327 11/21/23  1124   SODIUM 133* 131* 132*   POTASSIUM 4.7 4.9 5.2   CHLORIDE 96 97 97   CO2 26 23 23   GLUCOSE 159* 184* 200*   BUN 15 21 21   CREATININE 0.83 0.67 0.67   CALCIUM 8.3* 8.4* 8.5     Recent Labs     11/21/23  1124   INR 1.07               Imaging  DX-ABDOMEN FOR TUBE PLACEMENT   Final Result      1.  NG tube tip projects at the stomach.   2.  Small left pleural effusion with adjacent airspace disease.      OU-[id number]-6 VIEW   Final Result      No acute process.      SE-[id number]-4 VIEW   Final Result      1.  Contrast in the colon and rectum.   2.  Broad sweep of the enteric tube in keeping with the patient's known large infradiaphragmatic mass                  IR-PICC LINE PLACEMENT W/ GUIDANCE > AGE 5   Final Result                  Ultrasound-guided PICC placement performed by qualified nursing staff as    above.          DX-SMALL BOWEL SERIES   Final Result      1. High-grade distal mechanical small bowel obstruction, likely in the ileum.   2. No complete bowel obstruction.      I, Dr. Del Piña, discussed the results of this examination directly by phone with Dr. Montanez on 11/14/2023 at 1627 hours.      DX-ABDOMEN FOR TUBE PLACEMENT   Final Result      1.  Enteric tube has been adjusted and now projects over the gastric body in satisfactory position.      DX-ABDOMEN FOR TUBE PLACEMENT   Final Result      1.  Presumed enteric tube projects over the upper mid thorax likely in the thoracic esophagus above the level of the aster. It is  recommended this be removed and reinserted or readjusted.      2.  Findings were originally called to the patient's nurse on 11/12/2023 at 1:23 AM however she was unavailable due to assisting another patient. Findings  were discussed with the patient's nurse Haja, 1:45 AM on 11/12/2023.      OUTSIDE IMAGES-CT ABDOMEN /PELVIS   Final Result      IR-GASTROSTOMY PLACEMENT    (Results Pending)        Assessment/Plan  * SBO (small bowel obstruction) (HCC)- (present on admission)  Assessment & Plan  NGT management and diet per surgery  For TPN  IR consulted for G-tube placement  Adjust Dilaudid PCA      Clostridium difficile infection- (present on admission)  Assessment & Plan  Finished course of antibiotics -discussed with pharmacy    Metastatic renal cell carcinoma (HCC)- (present on admission)  Assessment & Plan  Open left radical nephrectomy 8/2/2023  Metastatic to lungs, liver, lymph nodes  Palliative care following  Follow-up with oncology as outpatient    Hyperglycemia- (present on admission)  Assessment & Plan  Start SSI  Check [id number]     Hypomagnesemia- (present on admission)  Assessment & Plan  Follow level   Replaced through TPN    Hypokalemia- (present on admission)  Assessment & Plan  Follow bmp    Hyponatremia- (present on admission)  Assessment & Plan  Follow BMP    Leukocytosis- (present on admission)  Assessment & Plan  Follow CBC    Normocytic anemia- (present on admission)  Assessment & Plan  Follow CBC    Severe protein-calorie malnutrition (HCC)- (present on admission)  Assessment & Plan  TPN         VTE prophylaxis:    heparin ppx      I have performed a physical exam and reviewed and updated ROS and Plan today (11/21/2023). In review of yesterday's note (11/20/2023), there are no changes except as documented above.

## 2023-11-21 NOTE — PROGRESS NOTES
"Inpatient Palliative Care     Location: [place] [address]     HPI:   Angelo is seen lying in bed his wife Yusra is at bedside.  He is pale and frail in appearance reports he is passing flatus.  He remains hopeful and is looking forward to venting G-tube placement..     Summary:  Continue to discuss goals of care.  Angelo informs that he is hopeful to have venting G-tube with possibility of being able to eat and manage symptoms for same.  Explored possibility that tumor burden may prohibit ability to eat and safely stop TPN.  His goal is to get to next dose of Keytruda which he must do as an outpatient.  Yusra informs provider she has checked with [facility] and he is able to be inpatient in their hospital on TPN.  This still brings the barrier of ability to receive Keytruda while inpatient.  Angelo remains very hopeful that Keytruda will cure his cancer.  We discussed his comments last week regarding \"I know it is a long shot\", he feels as though he should \"keep fighting\".    Did not readdress CODE STATUS.  Patient reports better pain control on Dilaudid PCA per primary team.  Did not notice much difference from steroid dosing.      Active listening, reflection, reminiscing, validation & normalization, and empathic support utilized throughout this encounter.  All questions answered and contact information provided, encouraged to call with any questions or concerns.      Plan:     1) PC will continue to follow along peripherally.  2)   3)        Thank you for allowing me the opportunity to participate in the care of Angelo.     I spent a total of 40 minutes reviewing medical records, direct face-to-face time with the patient and/or family, coordination of care, and documentation. This is separate from the time spent on advance care planning, which is documented above.     Glo Villagomez, MSN, APRN, ACNPC-AG.  Palliative Care Nurse Practitioner  [phone]    "

## 2023-11-21 NOTE — PROGRESS NOTES
Pt presents to IR3. Pt was consented by MD at bedside, confirmed by this RN and consent at bedside. Pt transferred to IR table in supine position. Patient underwent a gastrostomy tube insertion by Dr. Astorga. Procedure site was marked by MD and verified using imaging guidance. Pt placed on monitor, prepped and draped in a sterile fashion. Vitals were taken every 5 minutes and remained stable during procedure (see doc flow sheet for results). CO2 waveform capnography was monitored and remained WNL throughout procedure. Report called to simone Nj RN. Pt transported by stretcher with RN to T403 b02.     AVANOS  Bolus Gastrostomy Feeding Tube  18Fr.  REF: [id number]  LOT: [id number]  EXP: 01/31/2026

## 2023-11-21 NOTE — PROGRESS NOTES
Assumed care of patient at 0645. Bedside report received Assessment complete.  AA&Ox4. Denies CP/SOB.  R Nare NG to LCWS, Gastric Aspirate Present   Reporting 7/10 pain. Hydromorphone PCA in place for pain management   Educated patient regarding pharmacologic and non pharmacologic modalities for pain management.  Skin per flowsheets  NPO Strict at this time. Denies N/V.  + void via Juarez Catheter.  Last BM 11/19  Pt ambulates SBA w FWW.  All needs met at this time. Call light within reach. Pt calls appropriately. Bed low and locked, non skid socks in place. Hourly rounding in place.

## 2023-11-21 NOTE — CARE PLAN
The patient is Stable - Low risk of patient condition declining or worsening    Shift Goals  Clinical Goals: Pain control and NG management  Patient Goals: comfort and rest  Family Goals: N/A    Progress made toward(s) clinical / shift goals:  Pain controlled with PCA. NG managed by intermittently monitoring output and placement. Pt denies nausea. Pt slept intermittently throughout shift.     Problem: Knowledge Deficit - Standard  Goal: Patient and family/care givers will demonstrate understanding of plan of care, disease process/condition, diagnostic tests and medications  Outcome: Progressing     Problem: Pain - Standard  Goal: Alleviation of pain or a reduction in pain to the patient’s comfort goal  Outcome: Progressing

## 2023-11-21 NOTE — PROGRESS NOTES
"Pharmacy TPN Note for 2023     63 y.o. male on TPN day # 5      Admission History: Admitted on 2023 for SBO (small bowel obstruction) ([facility]) [K56.609]    Allergies:   Grass pollen(k-o-r-t-swt dinesh), Seasonal, and Juniper tar     Indication for TPN:   Indication: Bowel Obstruction/Ileus       Clinical Considerations for TPN:   Clinical Considerations Impacting TPN: Not Applicable           Temp (24hrs), Av.7 °C (98.1 °F), Min:36.4 °C (97.5 °F), Max:37 °C (98.6 °F)    Recent Labs     23  1226 23  0745 23  1327   SODIUM 131* 133* 131*   POTASSIUM 5.1 4.7 4.9   CHLORIDE 95* 96 97   CO2 26 26 23   BUN 11 15 21   CREATININE 0.83 0.83 0.67   GLUCOSE 151* 159* 184*   CALCIUM 8.5 8.3* 8.4*   ASTSGOT 16  --  23   ALTSGPT 6  --  8   ALBUMIN 2.6*  --  2.5*   TBILIRUBIN 0.2  --  0.2   PHOSPHORUS 3.3 3.1 2.6   MAGNESIUM 2.1 2.1 1.9     Accu-Checks  No results for input(s): \"POCGLUCOSE\" in the last 72 hours.    Vitals:    23 0431 23 0830 23 1238 23 1527   BP: 115/78 112/71 103/61 124/80   Weight:       Height:           Intake/Output Summary (Last 24 hours) at 2023 1613  Last data filed at 2023 1527  Gross per 24 hour   Intake --   Output 3350 ml   Net -3350 ml       Orders Placed This Encounter   Procedures    Diet NPO Restrict to: Ice Chips (sips of water okay)     Standing Status:   Standing     Number of Occurrences:   1     Order Specific Question:   Diet NPO Restrict to:     Answer:   Ice Chips [2]     Comments:   sips of water okay       TPN for past 72 hours (Show up to 3 orders; newest on the left. Changes between the two most recent orders are indicated.)       Start date and time    2023      TPN Adult Central Line [[id number]] TPN Adult Central Line [[id number]] TPN Adult Central Line [[id number]]    Order Status  Active Completed Completed    Last Admin  New Bag at 2023 2005 by Elissa Puckett R.N. New " Bag at 11/18/2023 2107 by Ericka Corey R.N. New Bag at 11/17/2023 2059 by Ericka Corey R.N.    Frequency  TPN DAILY TPN DAILY TPN DAILY       Additives    thiamine  100 mg 100 mg 100 mg    folic acid  -- -- 1 mg       Electrolytes    potassium phosphate  36 mmol 30 mmol 24 mmol    potassium chloride  15 mEq 15 mEq 40 mEq    sodium acetate  130 mEq 130 mEq 150 mEq    sodium chloride  200 mEq 200 mEq 150 mEq    magnesium sulfate  16 mEq 16 mEq 16 mEq    calcium GLUConate  15 mEq 15 mEq 13.95 mEq    M.T.E.-4 Tralement  1 mL 1 mL 1 mL    M.V.I. Adult  10 mL 10 mL 10 mL       Amino Acids    Clinisol 15%  125 g 125 g 45 g       Dextrose    dextrose 70%  295 g 295 g 110 g       QS Base    sterile water  472.57 mL 474.57 mL 1,223.26 mL       Lipid    fat emulsion (soy) 20%  50 g 50 g 25 g       Energy Contribution    Proteins  500 kcal 500 kcal 180 kcal    Dextrose  1,003 kcal 1,003 kcal 374 kcal    Lipids  500 kcal 500 kcal 250 kcal    Total  2,003 kcal 2,003 kcal 804 kcal       Electrolyte Ion Calculated Amount    Sodium  330 mEq 330 mEq 300 mEq    Potassium  67.8 mEq 59 mEq 75.2 mEq    Calcium  15 mEq 15 mEq 13.95 mEq    Magnesium  16 mEq 16 mEq 16 mEq    Aluminum  208.33 mEq 208.33 mEq 75 mEq    Phosphate  36 mmol 30 mmol 24 mmol    Chloride  215 mEq 215 mEq 190 mEq    Acetate  235.83 mEq 235.83 mEq 188.1 mEq    Chloride: Acetate Ratio  0.91 0.91 1.01       Other    Total Amino Acid  125 g 125 g 45 g    Total Amino Acid/kg  1.73 g/kg 1.73 g/kg 0.62 g/kg    Glucose Infusion Rate  2.83 mg/kg/min 2.83 mg/kg/min 1.06 mg/kg/min    Volume  2,160 mL 2,160 mL 1,992 mL    Rate  90 mL/hr 90 mL/hr 83 mL/hr    Dosing Weight  72.4 kg 72.4 kg 72.4 kg    Infusion Site  Central Central Central            TPN Requirements:  Weight Used in TPN Calculation: 75 kg (165 lb 5.5 oz)  Total Protein Needs (g/kg):  (1.5 - 2 g/kg)  Total Caloric Needs (kcal):  (~1,900 - 2,200 kcals/day)  Total Fluid Needs (mL):  (25 - 35 mL/kg/day)             Current TPN Formulation:  % of goal: 100  % kcal as lipids: 25  Grams protein/k.67  Non-protein calories: 1,503  Kcals/k  Total daily calories: 2,003    Comments:  No changes from previous TPN formulation.  CMP labs were not yet resulted prior to today's TPN formulation, however, based on CMP from , labs and electrolytes were stable. Will assess TPN formation again tomorrow.       Jessica Durham, KarenD

## 2023-11-21 NOTE — PROGRESS NOTES
"  DATE: 11/21/2023    Hospital Day 10  large retroperitoneal tumor with obstruction .    INTERVAL EVENTS:  IR PEG placement ordered yesterday evening & pending.    REVIEW OF SYSTEMS:  Review of systems is remarkable for the following fatigue & back pain. The remainder of the comprehensive ROS is negative with the exception of the aforementioned HPI, PMH, and PSH bullets in accordance with CMS guideline.    PHYSICAL EXAMINATION:  Vital Signs: /76   Pulse (!) 109   Temp 36.6 °C (97.9 °F) (Temporal)   Resp 18   Ht 1.854 m (6' 1\")   Wt 72.4 kg (159 lb 9.8 oz)   SpO2 95%     Physical Exam  Constitutional:       General: He is not in acute distress.     Appearance: He is not toxic-appearing or diaphoretic.   HENT:      Nose:      Comments: NG tube intact to continuous suction.  Pulmonary:      Effort: Pulmonary effort is normal. No respiratory distress.   Abdominal:      General: Bowel sounds are decreased. There is distension (mild).      Palpations: Abdomen is soft.      Tenderness: There is no abdominal tenderness. There is no guarding.   Skin:     Coloration: Skin is pale.   Neurological:      Mental Status: He is alert and oriented to person, place, and time. Mental status is at baseline.       LABORATORY VALUES:  Recent Labs     11/18/23  1208 11/19/23  0745 11/20/23  1327   WBC 15.2* 14.4* 17.4*   RBC 2.94* 2.63* 2.75*   HEMOGLOBIN 7.7* 7.0* 7.3*   HEMATOCRIT 25.5* 23.2* 23.8*   MCV 86.7 88.2 86.5   MCH 26.2* 26.6* 26.5*   MCHC 30.2* 30.2* 30.7*   RDW 50.7* 52.6* 52.7*   PLATELETCT 348 325 314   MPV 9.5 10.2 10.7     Recent Labs     11/18/23  1226 11/19/23  0745 11/20/23  1327   SODIUM 131* 133* 131*   POTASSIUM 5.1 4.7 4.9   CHLORIDE 95* 96 97   CO2 26 26 23   GLUCOSE 151* 159* 184*   BUN 11 15 21   CREATININE 0.83 0.83 0.67   CALCIUM 8.5 8.3* 8.4*     Recent Labs     11/18/23  1226 11/20/23  1327   ASTSGOT 16 23   ALTSGPT 6 8   TBILIRUBIN 0.2 0.2   ALKPHOSPHAT 149* 179*   GLOBULIN 3.1 3.6* "     IMAGING:  DX-ABDOMEN FOR TUBE PLACEMENT   Final Result      1.  NG tube tip projects at the stomach.   2.  Small left pleural effusion with adjacent airspace disease.      [id number] VIEW   Final Result      No acute process.      [id number] VIEW   Final Result      1.  Contrast in the colon and rectum.   2.  Broad sweep of the enteric tube in keeping with the patient's known large infradiaphragmatic mass                  IR-PICC LINE PLACEMENT W/ GUIDANCE > AGE 5   Final Result                  Ultrasound-guided PICC placement performed by qualified nursing staff as    above.          DX-SMALL BOWEL SERIES   Final Result      1. High-grade distal mechanical small bowel obstruction, likely in the ileum.   2. No complete bowel obstruction.      I, Dr. Del Piña, discussed the results of this examination directly by phone with Dr. Montanez on 11/14/2023 at 1627 hours.      DX-ABDOMEN FOR TUBE PLACEMENT   Final Result      1.  Enteric tube has been adjusted and now projects over the gastric body in satisfactory position.      DX-ABDOMEN FOR TUBE PLACEMENT   Final Result      1.  Presumed enteric tube projects over the upper mid thorax likely in the thoracic esophagus above the level of the aster. It is  recommended this be removed and reinserted or readjusted.      2.  Findings were originally called to the patient's nurse on 11/12/2023 at 1:23 AM however she was unavailable due to assisting another patient. Findings were discussed with the patient's nurse Haja, 1:45 AM on 11/12/2023.      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  CT with retroperitoneal mass causing obstruction.  11/14 Small bowel series with obstruction.  11/17 Abdominal xray without residual  contrast in the small bowel.  11/18 Two large bowel movements with resolution of obstructive symptoms. Clamp NG tube and trial sips of water.  11/19 Failed clamping trial, continue NG tube to suction. Abdominal xray with residual contrast in colon.  - Recommended PEG tube placement for gastric decompression.  11/20 IR PEG tube placement ordered.      - Continue NG tube to low continuous suction  - PEG tube placement pending with IR  - [facility] will continue to follow      _______________________________    CHERELLE Luu.

## 2023-11-21 NOTE — PROGRESS NOTES
"Pharmacy TPN Note for 2023     63 y.o. male on TPN day # 6      Admission History: Admitted on 2023 for SBO (small bowel obstruction) ([facility]) [K56.609]    Allergies:   Grass pollen(k-o-r-t-swt dinesh), Seasonal, and Juniper tar     Indication for TPN:   Indication: Bowel Obstruction/Ileus       Clinical Considerations for TPN:   Clinical Considerations Impacting TPN: Not Applicable           Temp (24hrs), Av.6 °C (97.9 °F), Min:36.2 °C (97.2 °F), Max:37 °C (98.6 °F)    Recent Labs     23  0745 23  1327 23  1124   SODIUM 133* 131* 132*   POTASSIUM 4.7 4.9 5.2   CHLORIDE 96 97 97   CO2    BUN 15 21 21   CREATININE 0.83 0.67 0.67   GLUCOSE 159* 184* 200*   CALCIUM 8.3* 8.4* 8.5   ASTSGOT  --  23  --    ALTSGPT  --  8  --    ALBUMIN  --  2.5*  --    TBILIRUBIN  --  0.2  --    PHOSPHORUS 3.1 2.6  --    MAGNESIUM 2.1 1.9 1.9     Accu-Checks  No results for input(s): \"POCGLUCOSE\" in the last 72 hours.    Vitals:    23 1438 23 1444 23 1505 23 1532   BP: 127/86 130/86 128/85 113/77   Weight:       Height:           Intake/Output Summary (Last 24 hours) at 2023 1553  Last data filed at 2023 1138  Gross per 24 hour   Intake 1014.02 ml   Output 2330 ml   Net -1315.98 ml       Orders Placed This Encounter   Procedures    Diet NPO Restrict to: Ice Chips (sips of water okay)     Standing Status:   Standing     Number of Occurrences:   1     Order Specific Question:   Diet NPO Restrict to:     Answer:   Ice Chips [2]     Comments:   sips of water okay       TPN for past 72 hours (Show up to 3 orders; newest on the left. Changes between the two most recent orders are indicated.)       Start date and time    2023      TPN Adult Central Line [[id number]] TPN Adult Central Line [[id number]] TPN Adult Central Line [[id number]]    Order Status  Active Last Dose in Progress Completed    Last Admin   New Bag at 2023 " 2004 by Elissa Puckett R.N. New Bag at 11/18/2023 2107 by Ericka Corey R.N.    Frequency  TPN DAILY TPN DAILY TPN DAILY       Additives    thiamine  100 mg 100 mg 100 mg       Electrolytes    potassium phosphate  -- 36 mmol 30 mmol    potassium chloride  20 mEq 15 mEq 15 mEq    potassium acetate  25 mEq -- --    sodium acetate  135 mEq 130 mEq 130 mEq    sodium chloride  140 mEq 200 mEq 200 mEq    sodium phosphate  42 mmol -- --    magnesium sulfate  20 mEq 16 mEq 16 mEq    calcium GLUConate  15 mEq 15 mEq 15 mEq    M.T.E.-4 Tralement  1 mL 1 mL 1 mL    M.V.I. Adult  10 mL 10 mL 10 mL       Amino Acids    Clinisol 15%  125 g 125 g 125 g       Dextrose    dextrose 70%  285 g 295 g 295 g       QS Base    sterile water  481.38 mL 472.57 mL 474.57 mL       Lipid    fat emulsion (soy) 20%  50 g 50 g 50 g       Energy Contribution    Proteins  500 kcal 500 kcal 500 kcal    Dextrose  969 kcal 1,003 kcal 1,003 kcal    Lipids  500 kcal 500 kcal 500 kcal    Total  1,969 kcal 2,003 kcal 2,003 kcal       Electrolyte Ion Calculated Amount    Sodium  331 mEq 330 mEq 330 mEq    Potassium  45 mEq 67.8 mEq 59 mEq    Calcium  15 mEq 15 mEq 15 mEq    Magnesium  20 mEq 16 mEq 16 mEq    Aluminum  208.33 mEq 208.33 mEq 208.33 mEq    Phosphate  42 mmol 36 mmol 30 mmol    Chloride  160 mEq 215 mEq 215 mEq    Acetate  265.83 mEq 235.83 mEq 235.83 mEq    Chloride: Acetate Ratio  0.6 0.91 0.91       Other    Total Amino Acid  125 g 125 g 125 g    Total Amino Acid/kg  1.73 g/kg 1.73 g/kg 1.73 g/kg    Glucose Infusion Rate  2.73 mg/kg/min 2.83 mg/kg/min 2.83 mg/kg/min    Volume  2,160 mL 2,160 mL 2,160 mL    Rate  90 mL/hr 90 mL/hr 90 mL/hr    Dosing Weight  72.4 kg 72.4 kg 72.4 kg    Infusion Site  Central Central Central            TPN Requirements:  Weight Used in TPN Calculation: 75 kg (165 lb 5.5 oz)  Total Protein Needs (g/kg):  (1.5 - 2 g/kg)  Total Caloric Needs (kcal):  (~1,900 - 2,200 kcals/day)  Total Fluid Needs (mL):  (25 -  35 mL/kg/day)     Current TPN Formulation:  % of goal: 100  % kcal as lipids: 22.9  Grams protein/k.73  Non-protein calories: 1469  Kcals/k  Total daily calories: 1969    Comments:  Patient's glucose trending up (200s), SSI added. Of note, patient is on dexamethasone 4 mg BID which may also contribute to patient's elevated blood glucose. Dextrose decreased in TPN formulation. Will continue to trend and adjust TPN formulation as necessary.      Jessica Durham, PharmD

## 2023-11-21 NOTE — PROGRESS NOTES
4 Eyes Skin Assessment Completed by Clem RN and ALEJANDRA Rider.    Head WDL  Ears WDL  Nose R Nare NG in place, Attached to Nose via Primapore Tape, LUZ Attachment Site   Mouth WDL  Neck WDL  Breast/Chest WDL  Shoulder Blades WDL  Spine WDL  (R) Arm/Elbow/Hand WDL  (L) Arm/Elbow/Hand L Bicep PICC Line, Dressing CDI  Abdomen RLQ PEG Tube, LUZ insertion site, Dressing in place   Groin Juarez in place,Insertion Site CDI   Scrotum/Coccyx/Buttocks WDL  (R) Leg WDL  (L) Leg WDL  (R) Heel/Foot/Toe WDL  (L) Heel/Foot/Toe WDL          Devices In Places Blood Pressure Cuff, Pulse Ox, Juarez, SCD's, OG/NG, and G Tube, PICC       Interventions In Place Pillows, Low Air Loss Mattress, and Heels Loaded W/Pillows    Possible Skin Injury No    Pictures Uploaded Into Epic N/A  Wound Consult Placed N/A  RN Wound Prevention Protocol Ordered No

## 2023-11-21 NOTE — CARE PLAN
Problem: Knowledge Deficit - Standard  Goal: Patient and family/care givers will demonstrate understanding of plan of care, disease process/condition, diagnostic tests and medications  Outcome: Progressing     Problem: Fall Risk  Goal: Patient will remain free from falls  Outcome: Progressing     Problem: Pain - Standard  Goal: Alleviation of pain or a reduction in pain to the patient’s comfort goal  Outcome: Progressing   The patient is Stable - Low risk of patient condition declining or worsening    Shift Goals  Clinical Goals: PEG Tube Placement  Patient Goals: PEG Tube Placement  Family Goals: N/A    Progress made toward(s) clinical / shift goals:  Peg tube placed, Educated patient on plan of care this shift, Pain medicated per MAR     Patient is not progressing towards the following goals:

## 2023-11-21 NOTE — PROGRESS NOTES
Bedside report received, assessment completed     A&O x  4, pt calls appropriately  Mobility: Up with x1 assist and FWW  Fall Risk Assessment: Low, door notifications in use  Pain Assessment / Reassessment completed, medication provided per MAR  Diet: NPO, sips and chips ok  LDA:   IV Access: L upper double PICC, CDI/ flushed/ Infusing TPN at 90 mL/hr  NG: R nare, LCS  PCA  Juarez: CDI     GI/: + void, + flatus, 11/19 BM  DVT Prophylaxis: Unfractionated Heparin, SCD's refused     Reviewed plan of care with patient, bed in lowest position and locked, pt resting comfortably now, call light within reach, all needs met at this time. Interventions will be executed per plan of care

## 2023-11-21 NOTE — DISCHARGE PLANNING
"Case Management Discharge Planning    Admission Date: 11/11/2023  GMLOS: 4.6  ALOS: 10    6-Clicks ADL Score: 11  6-Clicks Mobility Score: 11  PT and/or OT Eval ordered: Yes  Post-acute Referrals Ordered: Yes  Post-acute Choice Obtained: Yes  Has referral(s) been sent to post-acute provider:  Yes      Anticipated Discharge Dispo: Discharge Disposition: D/T to home under care of home health w/planned hosp IP readmit (86) vs SNF    DME Needed: No    Action(s) Taken: Updated Provider/Nurse on Discharge Plan  Per Chart: on 11/20  \" Dr. Patricio has discussed with patient and his wife the role of G tube for decompression via IR. He discussed that the G tube can be for palliative relief. Once the tract is fistulized there is the possibility to exchange for GJ tube for also nutrition. This will allow him to leave the hospital and med onc could work on restarting his treatment. Patient was in agreement with the plan. Dr. Patricio has voalted Ludy Lentz.\"    PT and OT recommends post acute placement.     Pt has Lakesha and has wife and Daughter for support.   Daughter lives in [facility] and per Chart, Pt 's Daughter also  states she can take Pt and His spouse in her home in [facility].     Per Adele ARTHUR,  Plan is PEG tube today .     Escalations Completed: None    Medically Clear: No    Next Steps:   CM to continue to assist Pt with discharge as needed    Barriers to Discharge:   Medical clearance  Placement vs Home with HH?     Is the patient up for discharge tomorrow: No        "

## 2023-11-21 NOTE — OR SURGEON
Immediate Post- Operative Note        Findings: Gastric Decompression      Procedure(s): G-tube Placement      Estimated Blood Loss: Less than 5 ml        Complications: None            11/21/2023     2:35 PM     Xander Astorga M.D.

## 2023-11-21 NOTE — PROGRESS NOTES
Dr. Patricio has discussed with patient and his wife the role of G tube for decompression via IR. He discussed that the G tube can be for palliative relief. Once the tract is fistulized there is the possibility to exchange for GJ tube for also nutrition. This will allow him to leave the hospital and med onc could work on restarting his treatment. Patient was in agreement with the plan. Dr. Patricio has voalted Isra Lentz and Shonda.

## 2023-11-22 NOTE — PROGRESS NOTES
"  DATE: 11/22/2023    Hospital Day 11  large retroperitoneal tumor with obstruction .    INTERVAL EVENTS:  IR PEG placement yesterday.  NGT with 500 cc bilious drainage.  Pain adequately controlled.    REVIEW OF SYSTEMS:  Review of systems is remarkable for the following fatigue & back pain. The remainder of the comprehensive ROS is negative with the exception of the aforementioned HPI, PMH, and PSH bullets in accordance with CMS guideline.    PHYSICAL EXAMINATION:  Vital Signs: /79   Pulse 97   Temp 36.5 °C (97.7 °F) (Temporal)   Resp 15   Ht 1.854 m (6' 1\")   Wt 72.4 kg (159 lb 9.8 oz)   SpO2 95%     Physical Exam  Constitutional:       General: He is not in acute distress.     Appearance: He is cachectic. He is not toxic-appearing or diaphoretic.   HENT:      Nose:      Comments: NG tube intact to continuous suction.  Pulmonary:      Effort: Pulmonary effort is normal. No respiratory distress.   Abdominal:      General: There is no distension (mild).      Palpations: Abdomen is soft.      Tenderness: There is no abdominal tenderness. There is no guarding.      Comments: G tube CDI   Skin:     Coloration: Skin is pale.   Neurological:      Mental Status: He is alert and oriented to person, place, and time. Mental status is at baseline.       LABORATORY VALUES:  Recent Labs     11/20/23  1327 11/21/23  1124 11/22/23  0254   WBC 17.4* 17.6* 17.0*   RBC 2.75* 2.79* 2.88*   HEMOGLOBIN 7.3* 7.6* 7.8*   HEMATOCRIT 23.8* 24.8* 25.4*   MCV 86.5 88.9 88.2   MCH 26.5* 27.2 27.1   MCHC 30.7* 30.6* 30.7*   RDW 52.7* 54.3* 57.1*   PLATELETCT 314 330 397   MPV 10.7 10.2 10.1     Recent Labs     11/20/23  1327 11/21/23  1124 11/22/23  0254   SODIUM 131* 132* 130*   POTASSIUM 4.9 5.2 4.7   CHLORIDE 97 97 95*   CO2 23 23 27   GLUCOSE 184* 200* 173*   BUN 21 21 21   CREATININE 0.67 0.67 0.66   CALCIUM 8.4* 8.5 8.4*     Recent Labs     11/20/23  1327 11/21/23  1124   ASTSGOT 23  --    ALTSGPT 8  --    TBILIRUBIN 0.2  --  "   ALKPHOSPHAT 179*  --    GLOBULIN 3.6*  --    INR  --  1.07     IMAGING:  IR-GASTROSTOMY PLACEMENT   Final Result         Technically successful percutaneous placement of 18-Bangladeshi gastrostomy tube in the antrum of the stomach.      DX-ABDOMEN FOR TUBE PLACEMENT   Final Result      1.  NG tube tip projects at the stomach.   2.  Small left pleural effusion with adjacent airspace disease.      [id number] VIEW   Final Result      No acute process.      UN-UTHZTGV-0 VIEW   Final Result      1.  Contrast in the colon and rectum.   2.  Broad sweep of the enteric tube in keeping with the patient's known large infradiaphragmatic mass                  IR-PICC LINE PLACEMENT W/ GUIDANCE > AGE 5   Final Result                  Ultrasound-guided PICC placement performed by qualified nursing staff as    above.          DX-SMALL BOWEL SERIES   Final Result      1. High-grade distal mechanical small bowel obstruction, likely in the ileum.   2. No complete bowel obstruction.      I, Dr. Del Piña, discussed the results of this examination directly by phone with Dr. Montanez on 11/14/2023 at 1627 hours.      DX-ABDOMEN FOR TUBE PLACEMENT   Final Result      1.  Enteric tube has been adjusted and now projects over the gastric body in satisfactory position.      DX-ABDOMEN FOR TUBE PLACEMENT   Final Result      1.  Presumed enteric tube projects over the upper mid thorax likely in the thoracic esophagus above the level of the aster. It is  recommended this be removed and reinserted or readjusted.      2.  Findings were originally called to the patient's nurse on 11/12/2023 at 1:23 AM however she was unavailable due to assisting another patient. Findings were discussed with the patient's nurse Haja, 1:45 AM on 11/12/2023.      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  CT with retroperitoneal mass causing obstruction.  11/14 Small bowel series with obstruction.  11/17 Abdominal xray without residual contrast in the small bowel.  11/18 Two large bowel movements with resolution of obstructive symptoms. Clamp NG tube and trial sips of water.  11/19 Failed clamping trial, continue NG tube to suction. Abdominal xray with residual contrast in colon.  - Recommended PEG tube placement for gastric decompression.  11/21 IR PEG tube placement.      - Clamp NG tube  - Place G tube to gravity  - If tolerated NG tube clamping and G tube appears to drain stomach, may discharge home tomorrow  - [facility] will continue to follow        _______________________________    Lexii Leung P.A.-C.

## 2023-11-22 NOTE — PROGRESS NOTES
Assumed care of patient at 1845. Bedside report received. Assessment complete.  AA&Ox4. Denies CP/SOB.  Reporting 3/10 pain. Medicated per MAR with PCA bolus button.  Educated patient regarding pharmacologic and non pharmacologic modalities for pain management.  Skin per flowsheet.  R nare NG set to LCS per MD order.   LLQ PEG capped per MD order.   Patient is currently NPO, sips.  Denies N/V.  + void via hawkins. - BM. Last BM 11/19  Pt ambulates x1 HH assist.  All needs met at this time. Call light within reach. Pt calls appropriately. Bed low and locked, non skid socks in place. Hourly rounding in place.

## 2023-11-22 NOTE — DISCHARGE PLANNING
Case Management Discharge Planning    Admission Date: 11/11/2023  GMLOS: 4.6  ALOS: 11    6-Clicks ADL Score: 11  6-Clicks Mobility Score: 11  PT and/or OT Eval ordered: Yes  Post-acute Referrals Ordered: Yes  Post-acute Choice Obtained: Yes  Has referral(s) been sent to post-acute provider:  Yes      Anticipated Discharge Dispo: Discharge Disposition: D/T to home under care of home health w/planned hosp IP readmit (86)    DME Needed: Yes    DME Ordered: Yes    Action(s) Taken: Updated Provider/Nurse on Discharge Plan    Pt was dicussed in IDT rounds today with Dr. Saleh.  Pt needs set up for Home Health and TPN.    This RN CM met with Pt and wife Keyana at bedside.   Pt is on PCA and TPN.   Pt is  S/P G tube placement on 11.21.    Pt and wife state the plan is Pt  to discharge to Pt's home in [place] at [address] [id number].  They both agreed with Home Health and Home TPN.  Pt's choice is [facility]  and [facility] for TPN.  Choices sent to [facility]bi [facility].  Pt has  been accepted by [facility] in [facility], pending with [facility].    For Home Health Keyana says okay to any Home Health that accepts Pt's Insurance. Pt has Summer Set.    Per Keyana,  Pt's family to provide transport to home in [place].    Per [facility]ily [facility] Pt is pending insurance auth.     Per Keyana,wife plan is Pt to apply for Medicare thru disability.    Per Mily of [facility] , their Pharmacist and Dietitian  will look into the referral and prepare formula based on labs/electrolytes.   Pt needs a following Physician for TPN.     Per Dr Patricio, better to ask Medical Oncology.     Escalations Completed: None    Medically Clear: No    Next Steps:   CM to continue to assist Pt with discharge as needed    Barriers to Discharge:   Pending medical clearance  Pending Home Health acceptance  Pending acceptance with [facility]    Is the patient up for discharge tomorrow: No

## 2023-11-22 NOTE — DIETARY
"Nutrition support weekly update:  Day 11 of admit.  Nghia Coffman Jr is a 63 y.o. male with admitting DX of SBO.      TPN initiated on 11/16. Current rate is providing 1969 kcal and 125 gm protein per day.     Assessment:  Weight 72.1 kg (159 lb 9.8 oz) taken via stand up scale on 11/15  Height: 185.4 cm (6'1\")  Re-estimate of nutritional needs not currently indicated. No new wt has been taken since 11/15.     Evaluation:   Per MAR, TPN currently running at 90 mL/hr, 100% of goal.  G-tube placed yesterday afternoon for decompression.  Current d/c plan is for pt to go home on TPN.  Labs: sodium 130, chloride 95, POC glucose 217. Refeeding labs have been WNL x 5 days now.   Meds: SSI  Labs: 11/19  Current TPN feeding meets both RD and PharmD goals.    Malnutrition risk (per RD assessment on 11/12/23): \"Pt with severe, chronic malnutrition related to hx renal cell cancer with subsequent nephrectomy, and new findings of metastatic disease, AEB severe wt loss of 9% in two months and <75% of estimated energy needs > 1 month.\"    Recommendations/Plan:  TPN per PharmD  Obtain updated wt       RD following  "

## 2023-11-22 NOTE — PROGRESS NOTES
"Pharmacy TPN Note for 2023     63 y.o. male on TPN day # 7      Admission History: Admitted on 2023 for SBO (small bowel obstruction) ([facility]) [K56.609]    Allergies:   Grass pollen(k-o-r-t-swt dinesh), Seasonal, and Juniper tar     Indication for TPN:   Indication: Bowel Obstruction/Ileus       Clinical Considerations for TPN:   Clinical Considerations Impacting TPN: Not Applicable           Temp (24hrs), Av.5 °C (97.7 °F), Min:36.3 °C (97.3 °F), Max:36.6 °C (97.9 °F)    Recent Labs     23  1327 23  1124 23  0254   SODIUM 131* 132* 130*   POTASSIUM 4.9 5.2 4.7   CHLORIDE 97 97 95*   CO2  27   BUN  21   CREATININE 0.67 0.67 0.66   GLUCOSE 184* 200* 173*   CALCIUM 8.4* 8.5 8.4*   ASTSGOT 23  --   --    ALTSGPT 8  --   --    ALBUMIN 2.5*  --   --    TBILIRUBIN 0.2  --   --    PHOSPHORUS 2.6  --   --    MAGNESIUM 1.9 1.9 1.9     Accu-Checks  No results for input(s): \"POCGLUCOSE\" in the last 72 hours.    Vitals:    23 0429 23 0717 23 1112 23 1506   BP: 104/73 116/76 122/79 109/74   Weight:       Height:           Intake/Output Summary (Last 24 hours) at 2023 1531  Last data filed at 2023 1506  Gross per 24 hour   Intake 2577.64 ml   Output 3185 ml   Net -607.36 ml       Orders Placed This Encounter   Procedures    Diet NPO Restrict to: Ice Chips (sips of water okay)     Standing Status:   Standing     Number of Occurrences:   1     Order Specific Question:   Diet NPO Restrict to:     Answer:   Ice Chips [2]     Comments:   sips of water okay       TPN for past 72 hours (Show up to 3 orders; newest on the left. Changes between the two most recent orders are indicated.)       Start date and time    2023      TPN Adult Central Line (Cyclic) [[id number]] TPN Adult Central Line [[id number]] TPN Adult Central Line [[id number]]    Order Status  Active Last Dose in Progress Completed    Last Admin   New Bag at " 11/21/2023 2115 by Gunjan Brady R.N. New Bag at 11/20/2023 2004 by Elissa Puckett R.N.    Frequency  TPN DAILY TPN DAILY TPN DAILY       Additives    thiamine  -- 100 mg 100 mg       Electrolytes    potassium phosphate  -- -- 36 mmol    potassium chloride  20 mEq 20 mEq 15 mEq    potassium acetate  25 mEq 25 mEq --    sodium acetate  100 mEq 135 mEq 130 mEq    sodium chloride  120 mEq 140 mEq 200 mEq    sodium phosphate  42 mmol 42 mmol --    magnesium sulfate  20 mEq 20 mEq 16 mEq    calcium GLUConate  15 mEq 15 mEq 15 mEq    M.T.E.-4 Tralement  1 mL 1 mL 1 mL    Infuvite Adult  10 mL -- --    M.V.I. Adult  -- 10 mL 10 mL       Amino Acids    Clinisol 15%  125 g 125 g 125 g       Dextrose    dextrose 70%  285 g 285 g 295 g       QS Base    sterile water  144.88 mL 481.38 mL 472.57 mL       Lipid    fat emulsion (soy) 20%  50 g 50 g 50 g       Energy Contribution    Proteins  500 kcal 500 kcal 500 kcal    Dextrose  969 kcal 969 kcal 1,003 kcal    Lipids  500 kcal 500 kcal 500 kcal    Total  1,969 kcal 1,969 kcal 2,003 kcal       Electrolyte Ion Calculated Amount    Sodium  276 mEq 331 mEq 330 mEq    Potassium  45 mEq 45 mEq 67.8 mEq    Calcium  15 mEq 15 mEq 15 mEq    Magnesium  20 mEq 20 mEq 16 mEq    Aluminum  208.33 mEq 208.33 mEq 208.33 mEq    Phosphate  42 mmol 42 mmol 36 mmol    Chloride  140 mEq 160 mEq 215 mEq    Acetate  230.83 mEq 265.83 mEq 235.83 mEq    Chloride: Acetate Ratio  0.605 0.6 0.91       Other    Total Amino Acid  125 g 125 g 125 g    Total Amino Acid/kg  1.73 g/kg 1.73 g/kg 1.73 g/kg    Glucose Infusion Rate  2.33-4.7 mg/kg/min 2.73 mg/kg/min 2.83 mg/kg/min    Volume  1,800 mL 2,160 mL 2,160 mL    Rate   mL/hr 90 mL/hr 90 mL/hr    Dosing Weight  72.4 kg 72.4 kg 72.4 kg    Infusion Site  [facility]            TPN Requirements:  Weight Used in TPN Calculation: 75 kg (165 lb 5.5 oz)  Total Protein Needs (g/kg):  (1.5 - 2 g/kg)  Total Caloric Needs (kcal):  (~1,900 -  2,200 kcals/day)  Total Fluid Needs (mL):  (25 - 35 mL/kg/day)     Current TPN Formulation:  % of goal: 100  % kcal as lipids: 22.9  Grams protein/k.73  Non-protein calories: 1469  Kcals/k.20  Total daily calories: 1969    Comments:  Patient's glucose continues to be elevated but is managed with minimal use of SSI. Patient is currently on steroid taper - anticipate blood glucose to be decrease as dexamethasone is discontinued within the next couple of days. Patient's TPN has been stable - is now switched over to cyclic today and will be run for 16 hrs.       Jessica Durham, PharmD

## 2023-11-22 NOTE — CARE PLAN
Problem: Knowledge Deficit - Standard  Goal: Patient and family/care givers will demonstrate understanding of plan of care, disease process/condition, diagnostic tests and medications  Outcome: Progressing     Problem: Fall Risk  Goal: Patient will remain free from falls  Outcome: Progressing     Problem: Pain - Standard  Goal: Alleviation of pain or a reduction in pain to the patient’s comfort goal  Outcome: Progressing   The patient is Stable - Low risk of patient condition declining or worsening    Shift Goals  Clinical Goals: Pain Control, NG Clamped, PEG Tube Venting  Patient Goals: Pain Control  Family Goals: N/A    Progress made toward(s) clinical / shift goals:  Pain Controlled per MAR, NG Clamped, PEG to Open Gravity Drainage, Free from falls at this time     Patient is not progressing towards the following goals:

## 2023-11-22 NOTE — THERAPY
Occupational Therapy  Daily Treatment     Patient Name: Nghia Coffman Jr  Age:  63 y.o., Sex:  male  Medical Record #: [id number]  Today's Date: 11/22/2023     Precautions  Precautions: Fall Risk, Nasogastric Tube  Comments: NG tube clamped, PCA    Assessment    Pt seen for OT tx. Pt received in bed and agreeable to session. Pt demo ability to complete seated grooming tasks with supv and mod A to complete LB dressing. Due to fatigue, he required CGA to complete functional mobility an txfs with FWW. Will continue to follow in the acute setting for ongoing OT services.     Plan    Treatment Plan Status: Continue Current Treatment Plan  Type of Treatment: Self Care / Activities of Daily Living, Therapeutic Exercises, Therapeutic Activity  Treatment Frequency: 3 Times per Week  Treatment Duration: Until Therapy Goals Met    DC Equipment Recommendations: Unable to determine at this time  Discharge Recommendations: Recommend post-acute placement for additional occupational therapy services prior to discharge home (If pt declines, he would benefit from home health OT for further services)     Objective      Vitals   O2 Delivery Device None - Room Air   Pain 0 - 10 Group   Therapist Pain Assessment Post Activity Pain Same as Prior to Activity;Nurse Notified  (Not rated, hit PCA prior to start of session, agreeable to activity)   Non Verbal Descriptors   Non Verbal Scale  Calm   Cognition    Cognition / Consciousness WDL   Level of Consciousness Alert   Comments Pleasant and participatory   Strength Upper Body   Gross Strength Generalized Weakness, Equal Bilaterally.    Other Treatments   Other Treatments Provided Pt states that he is eager to leave the hospital and hopes to DC from the hospital early next week. Pt plans to stay at his daughter's home while he is recoverying. Daughter lives in a tri level home and pt is able to stay on bottom level. There he will have access to a bedroom and full bath with walk-in  shower. He will not need to go up and down the stairs. Wife and family would be able to provide assist as needed.   Balance   Sitting Balance (Static) Fair   Sitting Balance (Dynamic) Fair   Standing Balance (Static) Fair   Standing Balance (Dynamic) Fair -   Weight Shift Sitting Fair   Weight Shift Standing Poor   Skilled Intervention Verbal Cuing;Tactile Cuing;Compensatory Strategies   Comments Limited by fatigue; 1 minor LOB but able to self correct   Bed Mobility    Supine to Sit Standby Assist   Sit to Supine   (Up to chair post)   Scooting Standby Assist   Skilled Intervention Verbal Cuing   Comments HOB slightly elevated   Activities of Daily Living   Eating Total Assist   Grooming Supervision;Seated   Lower Body Dressing Moderate Assist   Toileting   (NT; declined need during session)   Skilled Intervention Verbal Cuing;Tactile Cuing;Compensatory Strategies   6 Clicks Daily Activity Score 17   Functional Mobility   Sit to Stand Contact Guard Assist   Bed, Chair, Wheelchair Transfer Contact Guard Assist   Toilet Transfers   (NT; decline need during session)   Mobility Functional mobility in room w/FWW   Skilled Intervention Verbal Cuing;Tactile Cuing   Activity Tolerance   Sitting in Chair Up to chair post   Sitting Edge of Bed 2 min   Standing 4 min   Comments Limited due to decreased activity tolerance   Patient / Family Goals   Patient / Family Goal #1 To go home   Goal #1 Outcome Progressing as expected   Short Term Goals   Short Term Goal # 1 Pt will complete LB dressing with supv   Goal Outcome # 1 Progressing as expected   Short Term Goal # 2 Pt will complete ADL txfs with supv   Goal Outcome # 2 Progressing as expected   Short Term Goal # 3 Pt will complete UB dressing with supv   Goal Outcome # 3 Goal not met   Education Group   Education Provided Role of Occupational Therapist   Role of Occupational Therapist Patient Response Patient;Acceptance;Explanation;Verbal Demonstration

## 2023-11-22 NOTE — PROGRESS NOTES
Assumed care of patient at 0645. Bedside report received. Assessment complete.  AA&Ox4. Denies CP/SOB.  R [place] NG Clamped, PEG tube to Drainage per MD Order   Reporting 7/10 pain. PCA in place for Pain Management   Educated patient regarding pharmacologic and non pharmacologic modalities for pain management.  Skin per flowsheets  NPO at this time, Patient Receiving TPN @90mL/Hr. Denies N/V.  + void via Juarez Catheter. Last BM 11/19  Pt ambulates SBA w FWW.  All needs met at this time. Call light within reach. Pt calls appropriately. Bed low and locked, non skid socks in place. Hourly rounding in place.

## 2023-11-22 NOTE — THERAPY
Physical Therapy Contact Note    Patient Name: Nghia Coffman Jr  Age:  63 y.o., Sex:  male  Medical Record #: [id number]  Today's Date: 11/21/2023    Attempted PT tx, however pt was limited by pain s/p PEG tube placement today. Pt unable to even roll to his non-operative side at this time. Discussed PT POC with pt and his wife including importance of daily mobility OOB, ambulating to bathroom as able, and DC planning. All questions answered. Will re attempt PT tx session as pt is able to participate. Pt did state he usually feels better 24 hours after operations and thinks he will be ready to get OOB tomorrow 11/22.     Gerald Mccarthy, PT, DPT

## 2023-11-22 NOTE — DISCHARGE PLANNING
1138  Per RN CM request.  Agency/Facility Name: [facility]  Sent Referral per Choice Form at: 1138 am    1139  Per RN CM request.  Agency/Facility Name: [facility]  Sent Referral per Choice Form at: 1139 am    1330  Agency/Facility Name: [facility]  Spoke To: Zahraa  Outcome: Per Zahraa [facility] PCP does not take Pt INS.   RN CM notified.

## 2023-11-22 NOTE — CARE PLAN
The patient is Watcher - Medium risk of patient condition declining or worsening    Shift Goals  Clinical Goals: rest, pain control, mobility, I&Os  Patient Goals: rest, pain control  Family Goals: N/A    Progress made toward(s) clinical / shift goals:  Patient was able to rest intermittently overnight. Patient reported pain to be managed with P    Patient is not progressing towards the following goals:

## 2023-11-22 NOTE — THERAPY
Physical Therapy   Daily Treatment     Patient Name: Nghia Coffman Jr  Age:  63 y.o., Sex:  male  Medical Record #: [id number]  Today's Date: 11/22/2023     Precautions  Precautions: Fall Risk;Nasogastric Tube  Comments: NG tube clamped, PCA    Assessment    Pt seen for follow up PT tx. Pt reported feeling very weak but willing to work with therapist. CGA required for transfers and very short distances gait with FWW. Encouragement provided to ambulated further but pt declined due to fatigue and weakness. PT will cont while pt is in acute.    Plan    Treatment Plan Status: Continue Current Treatment Plan  Type of Treatment: Bed Mobility, Gait Training, Self Care / Home Evaluation, Therapeutic Activities, Therapeutic Exercise, Stair Training  Treatment Frequency: 3 Times per Week  Treatment Duration: Until Therapy Goals Met    DC Equipment Recommendations: Front-Wheel Walker  Discharge Recommendations: Recommend post-acute placement for additional physical therapy services prior to discharge home (if declines, pt will require HHPT and family assist)         11/22/23 1430   Vitals   O2 (LPM) 0   O2 Delivery Device None - Room Air   Pain 0 - 10 Group   Therapist Pain Assessment During Activity;Nurse Notified  (not rated)   Cognition    Level of Consciousness Alert   Other Treatments   Other Treatments Provided encouraged pt to continue mobilizing with RN staff   Balance   Sitting Balance (Static) Fair   Sitting Balance (Dynamic) Fair   Standing Balance (Static) Fair -   Standing Balance (Dynamic) Poor +   Weight Shift Sitting Fair   Weight Shift Standing Poor   Skilled Intervention Verbal Cuing;Compensatory Strategies   Comments FWW   Bed Mobility    Comments in chair   Gait Analysis   Gait Level Of Assist Contact Guard Assist   Assistive Device Front Wheel Walker   Distance (Feet) 15   # of Times Distance was Traveled 1   Deviation Decreased Base Of Support;Shuffled Gait;Bradykinetic   Weight Bearing Status no  restrictions   Skilled Intervention Verbal Cuing;Compensatory Strategies   Comments 1 large LOB requiring assistance. Pt declined further gait   Functional Mobility   Sit to Stand Contact Guard Assist   Bed, Chair, Wheelchair Transfer Contact Guard Assist   Transfer Method Stand Step   Mobility in room with FWW   Skilled Intervention Verbal Cuing   Patient / Family Goals    Patient / Family Goal #1 none stated   Short Term Goals    Short Term Goal # 1 pt will be able to complete functional transfers with FWW and SPV in 6tx in order to decrease fall risk   Goal Outcome # 1 goal not met   Short Term Goal # 2 pt will be able to ambulate 50ft with FWW and SPV in 6tx in order to decrease fall risk   Goal Outcome # 2 Goal not met   Short Term Goal # 3 pt will be able to negotiate 3 steps with SBA in 6tx in order to enter and exit home with spouse   Goal Outcome # 3 Goal not met   Physical Therapy Treatment Plan   Physical Therapy Treatment Plan Continue Current Treatment Plan   Anticipated Discharge Equipment and Recommendations   DC Equipment Recommendations Front-Wheel Walker   Discharge Recommendations Recommend post-acute placement for additional physical therapy services prior to discharge home  (if declines, pt will require HHPT and family assist)

## 2023-11-22 NOTE — PROGRESS NOTES
Hospital Medicine Daily Progress Note    Date of Service  11/22/2023    Chief Complaint  Nghia Coffman Jr is a 63 y.o. male admitted 11/11/2023 with small bowel obstruction    Hospital Course  Admitted with small bowel obstruction, known history of metastatic renal cell carcinoma, underwent left nephrectomy on 8/2/2023.  Surgery was consulted on the case.  Patient was placed on n.p.o., NG tube was placed.  He failed trial of clamping NGT and clear liquids. PICC line was placed for TPN.  Oncology was informed of the admission.  Palliative care was also consulted for pain management.  He was also recently diagnosed with Clostridium difficile infection and he was on oral vancomycin.  He finished a course with vancomycin enemas and IV Flagyl as he was unable to take the oral vancomycin.    Interval Problem Update  SBO - G tube placed yesterday by IR, discussed case with surgery  Hyperglycemia - -217  Leukocytosis - Tmax 97.9  Low magnesium    Updates given and plan of care discussed with patient's family who were at bedside    I have discussed this patient's plan of care and discharge plan at IDT rounds today with Case Management, Nursing, Nursing leadership, and other members of the IDT team.    Consultants/Specialty  general surgery, oncology, palliative care, and surgery oncology,interventional radiology    Code Status  Full Code    Disposition  The patient is not medically cleared for discharge to home or a post-acute facility.  Anticipate discharge to: home with organized home healthcare and close outpatient follow-up    I have placed the appropriate orders for post-discharge needs.    Review of Systems  Review of Systems   Constitutional:  Positive for malaise/fatigue. Negative for chills, diaphoresis and fever.   HENT:  Negative for congestion, hearing loss and sore throat.    Eyes:  Negative for blurred vision.   Respiratory:  Negative for cough, shortness of breath and wheezing.    Cardiovascular:   Negative for chest pain, palpitations and leg swelling.   Gastrointestinal:  Positive for abdominal pain. Negative for diarrhea, heartburn, nausea and vomiting.   Genitourinary:  Negative for dysuria, flank pain and hematuria.   Musculoskeletal:  Negative for back pain, joint pain, myalgias and neck pain.   Skin:  Negative for rash.   Neurological:  Positive for weakness. Negative for dizziness, sensory change, speech change, focal weakness and headaches.   Psychiatric/Behavioral:  The patient is nervous/anxious.         Physical Exam  Temp:  [36.2 °C (97.2 °F)-36.6 °C (97.9 °F)] 36.5 °C (97.7 °F)  Pulse:  [] 97  Resp:  [13-18] 15  BP: (104-134)/(66-86) 122/79  SpO2:  [94 %-100 %] 95 %    Physical Exam  Vitals and nursing note reviewed.   HENT:      Head: Normocephalic and atraumatic.      Nose: No congestion.      Mouth/Throat:      Mouth: Mucous membranes are moist.   Eyes:      Extraocular Movements: Extraocular movements intact.      Conjunctiva/sclera: Conjunctivae normal.   Cardiovascular:      Rate and Rhythm: Normal rate and regular rhythm.   Pulmonary:      Effort: Pulmonary effort is normal.      Breath sounds: Normal breath sounds.   Abdominal:      General: There is distension.      Tenderness: There is abdominal tenderness. There is no guarding or rebound.      Comments: G-tube   Musculoskeletal:      Cervical back: No tenderness.      Right lower leg: No edema.      Left lower leg: No edema.   Skin:     General: Skin is warm and dry.   Neurological:      General: No focal deficit present.      Mental Status: He is alert and oriented to person, place, and time.      Cranial Nerves: No cranial nerve deficit.   Psychiatric:         Mood and Affect: Mood is anxious.         Fluids    Intake/Output Summary (Last 24 hours) at 11/22/2023 1235  Last data filed at 11/22/2023 0720  Gross per 24 hour   Intake 2577.64 ml   Output 2835 ml   Net -257.36 ml         Laboratory  Recent Labs     11/20/23  1327  11/21/23  1124 11/22/23  0254   WBC 17.4* 17.6* 17.0*   RBC 2.75* 2.79* 2.88*   HEMOGLOBIN 7.3* 7.6* 7.8*   HEMATOCRIT 23.8* 24.8* 25.4*   MCV 86.5 88.9 88.2   MCH 26.5* 27.2 27.1   MCHC 30.7* 30.6* 30.7*   RDW 52.7* 54.3* 57.1*   PLATELETCT 314 330 397   MPV 10.7 10.2 10.1       Recent Labs     11/20/23  1327 11/21/23  1124 11/22/23  0254   SODIUM 131* 132* 130*   POTASSIUM 4.9 5.2 4.7   CHLORIDE 97 97 95*   CO2 23 23 27   GLUCOSE 184* 200* 173*   BUN 21 21 21   CREATININE 0.67 0.67 0.66   CALCIUM 8.4* 8.5 8.4*       Recent Labs     11/21/23  1124   INR 1.07                 Imaging  IR-GASTROSTOMY PLACEMENT   Final Result         Technically successful percutaneous placement of 18-Indian gastrostomy tube in the antrum of the stomach.      DX-ABDOMEN FOR TUBE PLACEMENT   Final Result      1.  NG tube tip projects at the stomach.   2.  Small left pleural effusion with adjacent airspace disease.      VQ-[id number]-5 VIEW   Final Result      No acute process.      UT-XXMJTES-5 VIEW   Final Result      1.  Contrast in the colon and rectum.   2.  Broad sweep of the enteric tube in keeping with the patient's known large infradiaphragmatic mass                  IR-PICC LINE PLACEMENT W/ GUIDANCE > AGE 5   Final Result                  Ultrasound-guided PICC placement performed by qualified nursing staff as    above.          DX-SMALL BOWEL SERIES   Final Result      1. High-grade distal mechanical small bowel obstruction, likely in the ileum.   2. No complete bowel obstruction.      I, Dr. Del Piña, discussed the results of this examination directly by phone with Dr. Montanez on 11/14/2023 at 1627 hours.      DX-ABDOMEN FOR TUBE PLACEMENT   Final Result      1.  Enteric tube has been adjusted and now projects over the gastric body in satisfactory position.      DX-ABDOMEN FOR TUBE PLACEMENT   Final Result      1.  Presumed enteric tube projects over the upper mid thorax likely in the thoracic esophagus above the  level of the aster. It is  recommended this be removed and reinserted or readjusted.      2.  Findings were originally called to the patient's nurse on 11/12/2023 at 1:23 AM however she was unavailable due to assisting another patient. Findings were discussed with the patient's nurse Haja, 1:45 AM on 11/12/2023.      OUTSIDE IMAGES-CT ABDOMEN /PELVIS   Final Result           Assessment/Plan  * SBO (small bowel obstruction) (HCC)- (present on admission)  Assessment & Plan  NGT management and diet per surgery  TPN  IR - successful percutaneous placement of 18-Vietnamese gastrostomy tube in the antrum of the stomach   11/21/2023  Dilaudid PCA      Clostridium difficile infection- (present on admission)  Assessment & Plan  Finished course of antibiotics -discussed with pharmacy    Metastatic renal cell carcinoma (HCC)- (present on admission)  Assessment & Plan  Open left radical nephrectomy 8/2/2023  Metastatic to lungs, liver, lymph nodes  Palliative care following  Follow-up with Oncology as outpatient    Hyperglycemia- (present on admission)  Assessment & Plan  SSI  hgab1c 5.4    Hypomagnesemia- (present on admission)  Assessment & Plan  Follow level   Replaced through TPN    Hypokalemia- (present on admission)  Assessment & Plan  Follow bmp    Hyponatremia- (present on admission)  Assessment & Plan  Follow BMP    Leukocytosis- (present on admission)  Assessment & Plan  Follow CBC    Normocytic anemia- (present on admission)  Assessment & Plan  Follow CBC    Severe protein-calorie malnutrition (HCC)- (present on admission)  Assessment & Plan  TPN         VTE prophylaxis:    heparin ppx      I have performed a physical exam and reviewed and updated ROS and Plan today (11/22/2023). In review of yesterday's note (11/21/2023), there are no changes except as documented above.

## 2023-11-23 NOTE — CARE PLAN
The patient is Stable - Low risk of patient condition declining or worsening    Shift Goals  Clinical Goals: Pain control, monitor PEG tube, TPN  Patient Goals: Pain control, go home  Family Goals: Not present    Progress made toward(s) clinical / shift goals:  TPN cyclically, PEG tube secure, pain control discussed     Problem: Fall Risk  Goal: Patient will remain free from falls  Outcome: Progressing  Note: Patient will remain free from falls for the duration of the shift.     Problem: Pain - Standard  Goal: Alleviation of pain or a reduction in pain to the patient’s comfort goal  Outcome: Progressing  Note: Pain will be maintained at a tolerable level for this shift using PCA and other methods such as repositioning and adjunct medications as per MAR.     Patient is not progressing towards the following goals:

## 2023-11-23 NOTE — PROGRESS NOTES
"  DATE: 11/23/2023    Hospital Day 12  large retroperitoneal tumor with obstruction .    INTERVAL EVENTS:  Increased shoulder pain overnight.  No nausea with NG tube clamped.  IR placed G tube with  300 cc bilious drainage.    REVIEW OF SYSTEMS:  Review of systems is remarkable for the following fatigue & back pain. The remainder of the comprehensive ROS is negative with the exception of the aforementioned HPI, PMH, and PSH bullets in accordance with CMS guideline.    PHYSICAL EXAMINATION:  Vital Signs: /73   Pulse (!) 117   Temp 36.6 °C (97.9 °F) (Temporal)   Resp 19   Ht 1.854 m (6' 1\")   Wt 72.4 kg (159 lb 9.8 oz)   SpO2 95%     Physical Exam  Vitals and nursing note reviewed.   Constitutional:       General: He is not in acute distress.     Appearance: He is cachectic. He is not toxic-appearing or diaphoretic.   HENT:      Nose:      Comments: NG tube intact to continuous suction.  Pulmonary:      Effort: Pulmonary effort is normal. No respiratory distress.   Abdominal:      General: There is no distension (mild).      Palpations: Abdomen is soft.      Tenderness: There is no abdominal tenderness. There is no guarding.      Comments: G tube CDI, bilious drainage   Skin:     Coloration: Skin is pale.   Neurological:      Mental Status: He is alert and oriented to person, place, and time. Mental status is at baseline.       LABORATORY VALUES:  Recent Labs     11/21/23  1124 11/22/23  0254 11/23/23  0520   WBC 17.6* 17.0* 21.4*   RBC 2.79* 2.88* 2.99*   HEMOGLOBIN 7.6* 7.8* 8.1*   HEMATOCRIT 24.8* 25.4* 26.6*   MCV 88.9 88.2 89.0   MCH 27.2 27.1 27.1   MCHC 30.6* 30.7* 30.5*   RDW 54.3* 57.1* 58.8*   PLATELETCT 330 397 406   MPV 10.2 10.1 9.8       Recent Labs     11/21/23  1124 11/22/23  0254 11/23/23  0520   SODIUM 132* 130* 132*   POTASSIUM 5.2 4.7 4.2   CHLORIDE 97 95* 95*   CO2 23 27 27   GLUCOSE 200* 173* 234*   BUN 21 21 24*   CREATININE 0.67 0.66 0.60   CALCIUM 8.5 8.4* 8.8       Recent Labs     " 11/20/23  1327 11/21/23  1124 11/23/23  0520   ASTSGOT 23  --  13   ALTSGPT 8  --  11   TBILIRUBIN 0.2  --  0.3   ALKPHOSPHAT 179*  --  233*   GLOBULIN 3.6*  --  3.8*   INR  --  1.07  --        IMAGING:  IR-GASTROSTOMY PLACEMENT   Final Result         Technically successful percutaneous placement of 18-Malay gastrostomy tube in the antrum of the stomach.      DX-ABDOMEN FOR TUBE PLACEMENT   Final Result      1.  NG tube tip projects at the stomach.   2.  Small left pleural effusion with adjacent airspace disease.      DG-HAFBOIG-3 VIEW   Final Result      No acute process.      FL-OHPOERC-4 VIEW   Final Result      1.  Contrast in the colon and rectum.   2.  Broad sweep of the enteric tube in keeping with the patient's known large infradiaphragmatic mass                  IR-PICC LINE PLACEMENT W/ GUIDANCE > AGE 5   Final Result                  Ultrasound-guided PICC placement performed by qualified nursing staff as    above.          DX-SMALL BOWEL SERIES   Final Result      1. High-grade distal mechanical small bowel obstruction, likely in the ileum.   2. No complete bowel obstruction.      I, Dr. Del Piña, discussed the results of this examination directly by phone with Dr. Montanez on 11/14/2023 at 1627 hours.      DX-ABDOMEN FOR TUBE PLACEMENT   Final Result      1.  Enteric tube has been adjusted and now projects over the gastric body in satisfactory position.      DX-ABDOMEN FOR TUBE PLACEMENT   Final Result      1.  Presumed enteric tube projects over the upper mid thorax likely in the thoracic esophagus above the level of the aster. It is  recommended this be removed and reinserted or readjusted.      2.  Findings were originally called to the patient's nurse on 11/12/2023 at 1:23 AM however she was unavailable due to assisting another patient. Findings were discussed with the patient's nurse Haja, 1:45 AM on 11/12/2023.      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  CT with retroperitoneal mass causing obstruction.  11/14 Small bowel series with obstruction.  11/17 Abdominal xray without residual contrast in the small bowel.  11/18 Two large bowel movements with resolution of obstructive symptoms. Clamp NG tube and trial sips of water.  11/19 Failed clamping trial, continue NG tube to suction. Abdominal xray with residual contrast in colon.  - Recommended PEG tube placement for gastric decompression.  11/21 IR PEG tube placement.  11/23 D/C NG tube.      - D/C NG tube  - G tube education prior to discharge  - [facility] will sign off, please call or reconsult as needed        _______________________________    Lexii Leung P.A.-C.

## 2023-11-23 NOTE — PROGRESS NOTES
[facility] Medicine Daily Progress Note    Date of Service  11/23/2023    Chief Complaint  Nghia Coffman Jr is a 63 y.o. male admitted 11/11/2023 with small bowel obstruction    Hospital Course  Admitted with small bowel obstruction, known history of metastatic renal cell carcinoma, underwent left nephrectomy on 8/2/2023.  Surgery was consulted on the case.  Patient was placed on n.p.o., NG tube was placed.  He failed trial of clamping NGT and clear liquids. PICC line was placed for TPN.  Oncology was informed of the admission.  Palliative care was also consulted for pain management.  He was also recently diagnosed with Clostridium difficile infection and he was on oral vancomycin.  He finished a course with vancomycin enemas and IV Flagyl as he was unable to take the oral vancomycin.  Interventional radiology was consulted and patient underwent IR - successful percutaneous placement of 18-German gastrostomy tube in the antrum of the stomach on 11/21/2023 for gastric decompression.    Interval Problem Update  SBO - discussed with Surgery, NGT to be removed today  Renal CA - pain not controlled  Hyperglycemia - -230  Leukocytosis - Tmax 97.9    Updates given and plan of care discussed with patient's spouse who was at bedside    I have discussed this patient's plan of care and discharge plan at IDT rounds today with Case Management, Nursing, Nursing leadership, and other members of the IDT team.    Consultants/Specialty  general surgery, oncology, palliative care, and surgery oncology,interventional radiology    Code Status  Full Code    Disposition  The patient is not medically cleared for discharge to home or a post-acute facility.  Anticipate discharge to: home with organized home healthcare and close outpatient follow-up    I have placed the appropriate orders for post-discharge needs.    Review of Systems  Review of Systems   Constitutional:  Positive for malaise/fatigue. Negative for chills,  diaphoresis and fever.   HENT:  Negative for congestion, hearing loss and sore throat.    Eyes:  Negative for blurred vision.   Respiratory:  Negative for cough, shortness of breath and wheezing.    Cardiovascular:  Negative for chest pain, palpitations and leg swelling.   Gastrointestinal:  Positive for abdominal pain. Negative for diarrhea, heartburn, nausea and vomiting.   Genitourinary:  Negative for dysuria, flank pain and hematuria.   Musculoskeletal:  Negative for back pain, joint pain, myalgias and neck pain.   Skin:  Negative for rash.   Neurological:  Positive for weakness. Negative for dizziness, sensory change, speech change, focal weakness and headaches.   Psychiatric/Behavioral:  The patient is nervous/anxious.         Physical Exam  Temp:  [36.2 °C (97.2 °F)-36.6 °C (97.9 °F)] 36.5 °C (97.7 °F)  Pulse:  [] 105  Resp:  [16-19] 18  BP: ()/(67-82) 124/76  SpO2:  [91 %-96 %] 91 %    Physical Exam  Vitals and nursing note reviewed.   HENT:      Head: Normocephalic and atraumatic.      Nose: No congestion.      Mouth/Throat:      Mouth: Mucous membranes are moist.   Eyes:      Extraocular Movements: Extraocular movements intact.      Conjunctiva/sclera: Conjunctivae normal.   Cardiovascular:      Rate and Rhythm: Normal rate and regular rhythm.   Pulmonary:      Effort: Pulmonary effort is normal.      Breath sounds: Normal breath sounds.   Abdominal:      General: There is distension.      Tenderness: There is abdominal tenderness. There is no guarding or rebound.      Comments: G-tube   Musculoskeletal:      Cervical back: No tenderness.      Right lower leg: No edema.      Left lower leg: No edema.   Skin:     General: Skin is warm and dry.   Neurological:      General: No focal deficit present.      Mental Status: He is alert and oriented to person, place, and time.      Cranial Nerves: No cranial nerve deficit.   Psychiatric:         Mood and Affect: Mood is anxious.          Fluids    Intake/Output Summary (Last 24 hours) at 11/23/2023 1314  Last data filed at 11/23/2023 0900  Gross per 24 hour   Intake 11.95 ml   Output 2200 ml   Net -2188.05 ml       Laboratory  Recent Labs     11/21/23  1124 11/22/23  0254 11/23/23  0520   WBC 17.6* 17.0* 21.4*   RBC 2.79* 2.88* 2.99*   HEMOGLOBIN 7.6* 7.8* 8.1*   HEMATOCRIT 24.8* 25.4* 26.6*   MCV 88.9 88.2 89.0   MCH 27.2 27.1 27.1   MCHC 30.6* 30.7* 30.5*   RDW 54.3* 57.1* 58.8*   PLATELETCT 330 397 406   MPV 10.2 10.1 9.8     Recent Labs     11/21/23  1124 11/22/23  0254 11/23/23  0520   SODIUM 132* 130* 132*   POTASSIUM 5.2 4.7 4.2   CHLORIDE 97 95* 95*   CO2 23 27 27   GLUCOSE 200* 173* 234*   BUN 21 21 24*   CREATININE 0.67 0.66 0.60   CALCIUM 8.5 8.4* 8.8     Recent Labs     11/21/23  1124   INR 1.07               Imaging  IR-GASTROSTOMY PLACEMENT   Final Result         Technically successful percutaneous placement of 18-Faroese gastrostomy tube in the antrum of the stomach.      DX-ABDOMEN FOR TUBE PLACEMENT   Final Result      1.  NG tube tip projects at the stomach.   2.  Small left pleural effusion with adjacent airspace disease.      DV-[id number]-4 VIEW   Final Result      No acute process.      DQ-SWOASCF-0 VIEW   Final Result      1.  Contrast in the colon and rectum.   2.  Broad sweep of the enteric tube in keeping with the patient's known large infradiaphragmatic mass                  IR-PICC LINE PLACEMENT W/ GUIDANCE > AGE 5   Final Result                  Ultrasound-guided PICC placement performed by qualified nursing staff as    above.          DX-SMALL BOWEL SERIES   Final Result      1. High-grade distal mechanical small bowel obstruction, likely in the ileum.   2. No complete bowel obstruction.      I, Dr. Del Piña, discussed the results of this examination directly by phone with Dr. Montanez on 11/14/2023 at 1627 hours.      DX-ABDOMEN FOR TUBE PLACEMENT   Final Result      1.  Enteric tube has been adjusted  and now projects over the gastric body in satisfactory position.      DX-ABDOMEN FOR TUBE PLACEMENT   Final Result      1.  Presumed enteric tube projects over the upper mid thorax likely in the thoracic esophagus above the level of the aster. It is  recommended this be removed and reinserted or readjusted.      2.  Findings were originally called to the patient's nurse on 11/12/2023 at 1:23 AM however she was unavailable due to assisting another patient. Findings were discussed with the patient's nurse Haja, 1:45 AM on 11/12/2023.      OUTSIDE IMAGES-CT ABDOMEN /PELVIS   Final Result           Assessment/Plan  * SBO (small bowel obstruction) (HCC)- (present on admission)  Assessment & Plan  NGT management and diet per surgery - NGT removal today  TPN  IR - successful percutaneous placement of 18-Malay gastrostomy tube in the antrum of the stomach   11/21/2023      Clostridium difficile infection- (present on admission)  Assessment & Plan  Finished course of antibiotics -discussed with pharmacy    Metastatic renal cell carcinoma (HCC)- (present on admission)  Assessment & Plan  Open left radical nephrectomy 8/2/2023  Metastatic to lungs, liver, lymph nodes  Palliative care following  Follow-up with Oncology as outpatient  Adjust Dilaudid PCA    Hyperglycemia- (present on admission)  Assessment & Plan  Adjust SSI  hgab1c 5.4    Hypomagnesemia- (present on admission)  Assessment & Plan  Follow level     Hypokalemia- (present on admission)  Assessment & Plan  Follow bmp    Hyponatremia- (present on admission)  Assessment & Plan  Follow BMP    Leukocytosis- (present on admission)  Assessment & Plan  Follow CBC    Normocytic anemia- (present on admission)  Assessment & Plan  Follow CBC    Severe protein-calorie malnutrition (HCC)- (present on admission)  Assessment & Plan  TPN         VTE prophylaxis:    heparin ppx      I have performed a physical exam and reviewed and updated ROS and Plan today (11/23/2023). In  review of yesterday's note (11/22/2023), there are no changes except as documented above.

## 2023-11-23 NOTE — PROGRESS NOTES
"Pharmacy TPN Note for 2023     63 y.o. male on TPN day # 8      Admission History: Admitted on 2023 for SBO (small bowel obstruction) ([facility]) [K56.609]    Allergies:   Grass pollen(k-o-r-t-swt dinesh), Seasonal, and Juniper tar     Indication for TPN:   Indication: Bowel Obstruction/Ileus       Clinical Considerations for TPN:   Clinical Considerations Impacting TPN: Diabetes           Temp (24hrs), Av.4 °C (97.6 °F), Min:36.2 °C (97.2 °F), Max:36.6 °C (97.9 °F)    Recent Labs     23  1327 23  1124 23  0254 23  0520   SODIUM 131* 132* 130* 132*   POTASSIUM 4.9 5.2 4.7 4.2   CHLORIDE 97 97 95* 95*   CO2  23 27 27   BUN 21  21 24*   CREATININE 0.67 0.67 0.66 0.60   GLUCOSE 184* 200* 173* 234*   CALCIUM 8.4* 8.5 8.4* 8.8   ASTSGOT 23  --   --  13   ALTSGPT 8  --   --  11   ALBUMIN 2.5*  --   --  2.7*   TBILIRUBIN 0.2  --   --  0.3   PHOSPHORUS 2.6  --   --  3.3   MAGNESIUM 1.9 1.9 1.9 2.1     Accu-Checks  No results for input(s): \"POCGLUCOSE\" in the last 72 hours.    Vitals:    23 1919 23 0012 23 0344 23 0720   BP: 96/67 117/67 132/82 112/73   Weight:       Height:           Intake/Output Summary (Last 24 hours) at 2023 1004  Last data filed at 2023 0900  Gross per 24 hour   Intake 11.95 ml   Output 2200 ml   Net -2188.05 ml       Orders Placed This Encounter   Procedures    Diet NPO Restrict to: Ice Chips (sips of water okay)     Standing Status:   Standing     Number of Occurrences:   1     Order Specific Question:   Diet NPO Restrict to:     Answer:   Ice Chips [2]     Comments:   sips of water okay       TPN for past 72 hours (Show up to 3 orders; newest on the left. Changes between the two most recent orders are indicated.)       Start date and time    2023      TPN Adult Central Line (Cyclic) [[id number]] TPN Adult Central Line (Cyclic) [[id number]] TPN Adult Central Line [[id number]]    Order " Status  Active Last Dose in Progress Completed    Last Admin   Rate Change at 11/22/2023 2353 by Allison Lloyd R.N. New Bag at 11/21/2023 2115 by Gunjan Brady R.N.    Frequency  TPN DAILY TPN DAILY TPN DAILY       Additives    thiamine  -- -- 100 mg       Electrolytes    potassium chloride  20 mEq 20 mEq 20 mEq    potassium acetate  25 mEq 25 mEq 25 mEq    sodium acetate  100 mEq 100 mEq 135 mEq    sodium chloride  120 mEq 120 mEq 140 mEq    sodium phosphate  42 mmol 42 mmol 42 mmol    magnesium sulfate  20 mEq 20 mEq 20 mEq    calcium GLUConate  15 mEq 15 mEq 15 mEq    M.T.E.-4 Tralement  1 mL 1 mL 1 mL    Infuvite Adult  10 mL 10 mL --    M.V.I. Adult  -- -- 10 mL       Amino Acids    Clinisol 15%  125 g 125 g 125 g       Dextrose    dextrose 70%  285 g 285 g 285 g       QS Base    sterile water  148.88 mL 144.88 mL 481.38 mL       Lipid    fat emulsion (soy) 20%  50 g 50 g 50 g       Energy Contribution    Proteins  500 kcal 500 kcal 500 kcal    Dextrose  969 kcal 969 kcal 969 kcal    Lipids  500 kcal 500 kcal 500 kcal    Total  1,969 kcal 1,969 kcal 1,969 kcal       Electrolyte Ion Calculated Amount    Sodium  276 mEq 276 mEq 331 mEq    Potassium  45 mEq 45 mEq 45 mEq    Calcium  15 mEq 15 mEq 15 mEq    Magnesium  20 mEq 20 mEq 20 mEq    Aluminum  208.33 mEq 208.33 mEq 208.33 mEq    Phosphate  42 mmol 42 mmol 42 mmol    Chloride  140 mEq 140 mEq 160 mEq    Acetate  230.83 mEq 230.83 mEq 265.83 mEq    Chloride: Acetate Ratio  0.605 0.605 0.6       Other    Total Amino Acid  125 g 125 g 125 g    Total Amino Acid/kg  1.73 g/kg 1.73 g/kg 1.73 g/kg    Glucose Infusion Rate  2.33-4.69 mg/kg/min 2.33-4.7 mg/kg/min 2.73 mg/kg/min    Volume  1,804 mL 1,800 mL 2,160 mL    Rate   mL/hr  mL/hr 90 mL/hr    Dosing Weight  72.4 kg 72.4 kg 72.4 kg    In[facility]            TPN Requirements:  Weight Used in TPN Calculation: 75 kg (165 lb 5.5 oz)  Total Protein Needs (g/kg):  (1.5 -  2 g/kg)  Total Caloric Needs (kcal):  (~1,900 - 2,200 kcals/day)  Total Fluid Needs (mL):  (25 - 35 mL/kg/day)     Current TPN Formulation:  % of goal: 100%  % kcal as lipids: 25%  Grams protein/k.7 g/kg  Non-protein calories: 1,469 non-protein kcal   Kcals/k kcal /kg  Total daily calories: 1,969 kcal    Comments:  1) Nutritional Plan: TPN day prior ran over 16 hours (per EPIC MAR). Will continue to cycle TPN over 16 hours. Dietician note from  reviewed.  2) Labs: From labs today, electrolytes remain stable. Will continue with current formulation. TPN formulated to NS equivalence. BG mildly elevated, which is to be expected with cycling TPN, will continue with low-intensity sliding scale. If BG remains elevated, can consider addition of insulin into TPN.   3) Fluids/additives: Multivitamin + trace elements   4) Changes to formulation: None  5) Next labs/note: Anticipated  (Monday)      Lorena Baeza, KarenD

## 2023-11-23 NOTE — PROGRESS NOTES
Assumed care of patient. Assessment performed. Patient reminded to reposition frequently as sacrum is red. NG tube is clamped and secured. PEG tube dressing is clean, dry, and intact without pain. TPN began as per order. Order clarified with pharmacy @1940 by charge ALEJANDRA Johnson, as there were two labels on the bag (one with the order, and one generic to the TPN formula as per pharmacist). Call light and belongings within reach. All needs met at this time.

## 2023-11-24 PROBLEM — R18.8 ASCITES: Status: ACTIVE | Noted: 2023-01-01

## 2023-11-24 NOTE — CARE PLAN
The patient is Stable - Low risk of patient condition declining or worsening    Shift Goals  Clinical Goals: Pain control, increase in hawkins output  Patient Goals: Pain control, rest  Family Goals: Not present    Progress made toward(s) clinical / shift goals:  Pain controlled with PCA, patient had low pain throughout the shift. Monitored hawkins, very positional, able to drain hawkins. Patient's discomfort decreased    Patient is not progressing towards the following goals:

## 2023-11-24 NOTE — PROGRESS NOTES
This RN replaced catheter, due to patient retaining 600 ml of urine after bladder scanned and hawkins not flushing or draining.   Second hawkins insertion attempted and unable to place. Two way coude catheter placed and urine still not draining into bag. This RN notified Dr. Saleh.

## 2023-11-24 NOTE — PROGRESS NOTES
Bedside report received from yasmany RN, assumed care at 1900.  Assessment complete.  A&O x 4. Patient calls appropriately.  Patient ambulates with x1 assist. Bed alarm on.   Patient has 3/10 pain. Pain managed with PCA.  Denies N&V. NPO, Cyclic PTN initiated  Surgical LLQ Peg tube to downdrain.  + void via hawkins catheter, + flatus, - BM.  Patient denies SOB. On room air.  Patient calm, pleasant, and cooperative, but fatigue.  Review plan with of care with patient. Call light and personal belongings within reach. Hourly rounding in place. All needs met at this time.

## 2023-11-24 NOTE — PROGRESS NOTES
Note to reader: this note follows the APSO format rather than the historical SOAP format. Assessment and plan located at the top of the note for ease of use.    Chief Complaint  63 y.o. year old male here  w/ history of metastatic RCC currently hospitalized. Noted to have decreased urine output from catheter w/ 600 cc on bladder scan. Several unsuccessful attempts at replacing catheter. Urology consulted for difficult catheter placement. Now S/P wire guided hawkins replacement.       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     11/24. CT demonstrated hawkins balloon inflated in bladder. Hawkins draining well with good urine output, 1.4L/24 hours, clear pale yellow. Patient denies hawkins pain or suprapubic pain. Cr 0.74.    Plan:  -Monitor Is and Os  -Trend renal function  -Urology to sign off, appreciate the consult     Disposition  Guarded      Case discussed with patient and Urology-Dr. Coto, who has directed this plan of care.     Review of Systems  Physical Exam   Review of Systems   Gastrointestinal:  Negative for abdominal pain.   Genitourinary:  Negative for hematuria.     Vitals:    11/24/23 0558 11/24/23 0729 11/24/23 1141 11/24/23 1551   BP: 120/79 121/81 113/76 119/83   Pulse: (!) 106 (!) 107 (!) 110 (!) 115   Resp: 17 16 18 16   Temp: 36.7 °C (98.1 °F) 36.7 °C (98.1 °F) 36.7 °C (98.1 °F) 37 °C (98.6 °F)   TempSrc: Temporal Temporal Temporal Temporal   SpO2: 94% 95% 95% 97%   Weight:       Height:         Physical  Exam  Constitutional:       Appearance: Normal appearance.      Comments: Cachetic, somnolent    HENT:      Head: Normocephalic and atraumatic.      Nose: Nose normal.   Eyes:      Conjunctiva/sclera: Conjunctivae normal.   Pulmonary:      Effort: Pulmonary effort is normal.   Genitourinary:     Penis: Normal.       Comments: Juarez in place draining clear yellow urine in bag  Skin:     General: Skin is warm and dry.   Neurological:      Mental Status: He is alert.   Psychiatric:         Mood and Affect: Mood normal.         Behavior: Behavior normal.          Hematology Chemistry   Lab Results   Component Value Date/Time    WBC 21.8 (H) 11/24/2023 03:08 AM    HEMOGLOBIN 7.2 (L) 11/24/2023 10:30 AM    HEMATOCRIT 23.2 (L) 11/24/2023 03:08 AM    PLATELETCT 383 11/24/2023 03:08 AM     Lab Results   Component Value Date/Time    SODIUM 136 11/24/2023 07:59 AM    POTASSIUM 4.7 11/24/2023 07:59 AM    CHLORIDE 100 11/24/2023 07:59 AM    CO2 25 11/24/2023 07:59 AM    GLUCOSE 275 (H) 11/24/2023 07:59 AM    BUN 35 (H) 11/24/2023 07:59 AM    CREATININE 0.74 11/24/2023 07:59 AM         Labs not explicitly included in this progress note were reviewed by the author.   Radiology/imaging not explicitly included in this progress note was reviewed by the author.     Radiology images reviewed and Labs reviewed

## 2023-11-24 NOTE — CONSULTS
[facility] [place] Consult/H&P Note    Primary Service: Medicine  Attending: Neil Saleh M.D.  Patient's Name/MRN: Nghia Coffman Jr, [id number]    Admit Date:11/11/2023  Today's Date: 11/23/2023   Length of stay:  LOS: 12 days   Room #: T403/02      Reason for consult/chief complaint: Urinary retention.  ID/HPI: Nghia Coffman Jr is a 63 y.o. male patient w/ history of metastatic RCC currently hospitalized. Noted to have decreased urine output from catheter w/ 600 cc on bladder scan. Several unsuccessful attempts at replacing catheter. Urology consulted for difficult catheter placement.       Past Medical History:   Past Medical History:   Diagnosis Date    Anesthesia 09/19/2023    many years ago had delayed emergence, recent anesthesia he had hypotension    Bowel habit changes 09/19/2023    diarrhea at time, constipated at times, medicated    Breath shortness 09/19/2023    with exertion    Cancer (HCC) 09/19/2023    left kidney, pancreatic cancer    Delayed emergence from general anesthesia     Disorder of thyroid     partial thyroid ectomy    Pain 09/19/2023    back, abdomen    Pneumonia 2000    Renal disorder 09/19/2023    kidney cancer (left)    Sleep apnea 09/19/2023    cpap, hasn't been using for last 2 weeks        Past Surgical History:   Past Surgical History:   Procedure Laterality Date    OR REMV KIDNEY,RADICAL  08/02/2023    Procedure: OPEN LEFT RADICAL NEPHRECTOMY;  Surgeon: Guy Jackson M.D.;  Location: SURGERY [facility];  Service: Urology    BIOPSY, PANCREAS  08/02/2023    Procedure: BIOPSY, PANCREAS, OPEN;  Surgeon: Farhan Portillo M.D.;  Location: SURGERY [facility];  Service: General    THYROID LOBECTOMY  2018    INGUINAL HERNIA REPAIR Left 2018    SINUSCOPY  1988    polyp removal        Family History:   No family history on file.      Social History:   Social History     Tobacco Use    Smoking status: Never    Smokeless tobacco: Never   Vaping Use    Vaping Use:  "Never used   Substance Use Topics    Alcohol use: Not Currently     Comment: rarely    Drug use: Never      Social History     Social History Narrative    Not on file        Allergies: he Grass pollen(k-o-r-t-swt dinesh), Seasonal, and Juniper tar    Medications:   Medications Prior to Admission   Medication Sig Dispense Refill Last Dose    megestrol (MEGACE) 40 MG/ML Suspension Take 800 mg by mouth 1 time a day as needed (Decreased Appetite). 20 mL = 800 mg.   COUPLE WEEKS AGO at PRN    calcium carbonate (TUMS) 500 MG Chew Tab Chew 500 mg 3 times a day as needed (Heartburn).   PRN at PRN    naproxen (ALEVE) 220 MG tablet Take 220 mg by mouth 2 times a day as needed (Mild Pain). Take with food.   1~2 DAYS AGO at PRN    omeprazole (PRILOSEC) 20 MG delayed-release capsule Take 20 mg by mouth 1 time a day as needed (Heartburn).   11/11/2023 at AM    oxyCODONE HCl ER (OXYCONTIN) 15 MG Tablet Extended Release 12 hour Abuse-Deterrent Take 15 mg by mouth every 12 hours.   11/11/2023 at 0630    vancomycin (VANCOCIN) 125 MG capsule Take 125 mg by mouth 4 times a day. 14 day course with unspecified start date.  Indications: Clostridium Difficile Infection   11/11/2023 at AM    LENVIMA, 20 MG DAILY DOSE, 2 x 10 MG Capsule Therapy Pack Take 20 mg by mouth every day. 2 capsules = 20 mg.   10/27/2023 at HELD DUE TO C. DIFF.    oxyCODONE immediate-release (ROXICODONE) 5 MG Tab Take 5 mg by mouth every four hours as needed (Breakthrough Pain).   11/11/2023 at AM    prochlorperazine (COMPAZINE) 10 MG Tab Take 1 Tablet by mouth every 6 hours as needed for Nausea/Vomiting (2nd line nausea). 30 Tablet 0 11/11/2023 at AM    ondansetron (ZOFRAN ODT) 4 MG TABLET DISPERSIBLE Take 1 Tablet by mouth every 6 hours as needed for Nausea/Vomiting. 10 Tablet 0 11/11/2023 at AM         Review of Systems    As per HPI     Physical Exam  VITAL SIGNS: /66   Pulse (!) 101   Temp 36.5 °C (97.7 °F) (Temporal)   Resp 17   Ht 1.854 m (6' 1\") " Comment: 's license  Wt 72.4 kg (159 lb 9.8 oz)   SpO2 91%   BMI 21.06 kg/m²     Gen: no acute distress  Abdomen: distended, tympanic  : Juarez catheter in place with scant output.     Labs:  Recent Labs     11/21/23  1124 11/22/23  0254 11/23/23  0520   WBC 17.6* 17.0* 21.4*   RBC 2.79* 2.88* 2.99*   HEMOGLOBIN 7.6* 7.8* 8.1*   HEMATOCRIT 24.8* 25.4* 26.6*   MCV 88.9 88.2 89.0   MCH 27.2 27.1 27.1   MCHC 30.6* 30.7* 30.5*   RDW 54.3* 57.1* 58.8*   PLATELETCT 330 397 406   MPV 10.2 10.1 9.8     Recent Labs     11/21/23  1124 11/22/23  0254 11/23/23  0520   SODIUM 132* 130* 132*   POTASSIUM 5.2 4.7 4.2   CHLORIDE 97 95* 95*   CO2 23 27 27   GLUCOSE 200* 173* 234*   BUN 21 21 24*   CREATININE 0.67 0.66 0.60   CALCIUM 8.5 8.4* 8.8     Recent Labs     11/21/23  1124   INR 1.07     Glucose:  Recent Labs     11/21/23  1124 11/22/23  0254 11/23/23  0520   GLUCOSE 200* 173* 234*     Coags:  Recent Labs     11/21/23  1124   INR 1.07         Assessment/Recommendation   63 y.o. male with metastatic RCC currently hospitalized. Urology consulted for difficult catheter replacement in setting of high bladder scan (600 cc). Existing catheter was removed. Using sterile technique, a Zip wire was placed into the bladder with minimal resistance. 16 Fr Councill tip catheter was threaded over the wire into the bladder. There was return 10-20 cc of clear yellow urine. 10 cc was instilled into the balloon. Catheter irrigated appropriately with 60 cc sterile saline. Patient tolerated the procedure well.    Continue Juarez catheter to dependent drainage.  Can obtain either renal/bladder ultrasound or non-contrast CT to confirm catheter placement in setting of high PVR.  Urology to continue to follow.

## 2023-11-24 NOTE — PROGRESS NOTES
Hospital Medicine Daily Progress Note    Date of Service  11/24/2023    Chief Complaint  Nghia Coffman Jr is a 63 y.o. male admitted 11/11/2023 with small bowel obstruction    Hospital Course  Admitted with small bowel obstruction, known history of metastatic renal cell carcinoma, underwent left nephrectomy on 8/2/2023.  Surgery was consulted on the case.  Patient was placed on n.p.o., NG tube was placed.  He failed trial of clamping NGT and clear liquids. PICC line was placed for TPN.  Oncology was informed of the admission.  Palliative care was also consulted for advance care planning.  He was also recently diagnosed with Clostridium difficile infection and he was on oral vancomycin.  He finished a course with vancomycin enemas and IV Flagyl as he was unable to take the oral vancomycin.  Interventional radiology was consulted and patient underwent IR - successful percutaneous placement of 18-Tajik gastrostomy tube in the antrum of the stomach on 11/21/2023 for gastric decompression.    Interval Problem Update  SBO - discussed with Surgery, NGT removed yesterday, with G-tube decompression, trial of clears for comfort  Renal CA - pain better controlled, discussed case with palliative care  Hyperglycemia - -237  Leukocytosis - Tmax 98.1  Ascites - noted on CT   Anemia - hgb trended down to 7.2    Updates given and plan of care discussed with patient's spouse who was at bedside    I have discussed this patient's plan of care and discharge plan at IDT rounds today with Case Management, Nursing, Nursing leadership, and other members of the IDT team.    Consultants/Specialty  general surgery, oncology, palliative care, and surgery oncology,interventional radiology    Code Status  Full Code    Disposition  The patient is not medically cleared for discharge to home or a post-acute facility.  Anticipate discharge to: home with organized home healthcare and close outpatient follow-up    I have placed the  appropriate orders for post-discharge needs.    Review of Systems  Review of Systems   Constitutional:  Positive for malaise/fatigue. Negative for chills, diaphoresis and fever.   HENT:  Negative for congestion, hearing loss and sore throat.    Eyes:  Negative for blurred vision.   Respiratory:  Negative for cough, shortness of breath and wheezing.    Cardiovascular:  Negative for chest pain, palpitations and leg swelling.   Gastrointestinal:  Positive for abdominal pain. Negative for diarrhea, heartburn, nausea and vomiting.   Genitourinary:  Negative for dysuria, flank pain and hematuria.   Musculoskeletal:  Negative for back pain, joint pain, myalgias and neck pain.   Skin:  Negative for rash.   Neurological:  Positive for weakness. Negative for dizziness, sensory change, speech change, focal weakness and headaches.   Psychiatric/Behavioral:  The patient is nervous/anxious.         Physical Exam  Temp:  [36.5 °C (97.7 °F)-36.7 °C (98.1 °F)] 36.7 °C (98.1 °F)  Pulse:  [101-110] 110  Resp:  [16-18] 18  BP: (100-121)/(60-81) 113/76  SpO2:  [91 %-98 %] 95 %    Physical Exam  Vitals and nursing note reviewed.   Constitutional:       Appearance: He is ill-appearing.   HENT:      Head: Normocephalic and atraumatic.      Nose: No congestion.      Mouth/Throat:      Mouth: Mucous membranes are dry.   Eyes:      Extraocular Movements: Extraocular movements intact.      Conjunctiva/sclera: Conjunctivae normal.   Cardiovascular:      Rate and Rhythm: Normal rate and regular rhythm.   Pulmonary:      Effort: Pulmonary effort is normal.      Breath sounds: Normal breath sounds.   Abdominal:      General: There is distension.      Tenderness: There is abdominal tenderness. There is no guarding or rebound.      Comments: G-tube   Musculoskeletal:      Cervical back: No tenderness.      Right lower leg: No edema.      Left lower leg: No edema.   Skin:     General: Skin is warm and dry.   Neurological:      General: No focal  deficit present.      Mental Status: He is alert and oriented to person, place, and time.      Cranial Nerves: No cranial nerve deficit.   Psychiatric:         Mood and Affect: Mood is anxious.         Speech: Speech is delayed.         Cognition and Memory: He exhibits impaired recent memory.         Fluids    Intake/Output Summary (Last 24 hours) at 11/24/2023 1232  Last data filed at 11/24/2023 1154  Gross per 24 hour   Intake 404.08 ml   Output 1985 ml   Net -1580.92 ml       Laboratory  Recent Labs     11/22/23  0254 11/23/23  0520 11/24/23  0308 11/24/23  1030   WBC 17.0* 21.4* 21.8*  --    RBC 2.88* 2.99* 2.65*  --    HEMOGLOBIN 7.8* 8.1* 7.0* 7.2*   HEMATOCRIT 25.4* 26.6* 23.2*  --    MCV 88.2 89.0 87.5  --    MCH 27.1 27.1 26.4*  --    MCHC 30.7* 30.5* 30.2*  --    RDW 57.1* 58.8* 58.0*  --    PLATELETCT 397 406 383  --    MPV 10.1 9.8 10.3  --      Recent Labs     11/22/23  0254 11/23/23  0520 11/24/23  0759   SODIUM 130* 132* 136   POTASSIUM 4.7 4.2 4.7   CHLORIDE 95* 95* 100   CO2 27 27 25   GLUCOSE 173* 234* 275*   BUN 21 24* 35*   CREATININE 0.66 0.60 0.74   CALCIUM 8.4* 8.8 8.7     Recent Labs     11/24/23  1030   INR 1.12               Imaging  CT-ABDOMEN-PELVIS WITH   Final Result      1.  Large mass is centered to the midline in the upper abdomen. Neoplasm is most likely. Neoplasm arising from the pancreas is a possibility since the distal pancreas is not visualized. Pancreas could also be compressed by mass of other origin.      2.  The mass encases the superior mesenteric and celiac arteries and abuts the superior mesenteric vein. The mass extends into the vick hepatis.      3.  Liver masses are present which are suspicious for metastases.      4.  Bilateral pleural effusions, larger on the left side.      5.  Free fluid is present in the abdomen and pelvis.      IR-GASTROSTOMY PLACEMENT   Final Result         Technically successful percutaneous placement of 18-Yoruba gastrostomy tube in the  antrum of the stomach.      DX-ABDOMEN FOR TUBE PLACEMENT   Final Result      1.  NG tube tip projects at the stomach.   2.  Small left pleural effusion with adjacent airspace disease.      CX-XCUCJWM-5 VIEW   Final Result      No acute process.      LT-[id number]-7 VIEW   Final Result      1.  Contrast in the colon and rectum.   2.  Broad sweep of the enteric tube in keeping with the patient's known large infradiaphragmatic mass                  IR-PICC LINE PLACEMENT W/ GUIDANCE > AGE 5   Final Result                  Ultrasound-guided PICC placement performed by qualified nursing staff as    above.          DX-SMALL BOWEL SERIES   Final Result      1. High-grade distal mechanical small bowel obstruction, likely in the ileum.   2. No complete bowel obstruction.      I, Dr. Del Piña, discussed the results of this examination directly by phone with Dr. Montanez on 11/14/2023 at 1627 hours.      DX-ABDOMEN FOR TUBE PLACEMENT   Final Result      1.  Enteric tube has been adjusted and now projects over the gastric body in satisfactory position.      DX-ABDOMEN FOR TUBE PLACEMENT   Final Result      1.  Presumed enteric tube projects over the upper mid thorax likely in the thoracic esophagus above the level of the aster. It is  recommended this be removed and reinserted or readjusted.      2.  Findings were originally called to the patient's nurse on 11/12/2023 at 1:23 AM however she was unavailable due to assisting another patient. Findings were discussed with the patient's nurse Haja, 1:45 AM on 11/12/2023.      OUTSIDE IMAGES-CT ABDOMEN /PELVIS   Final Result           Assessment/Plan  * SBO (small bowel obstruction) (HCC)- (present on admission)  Assessment & Plan  Diet per Surgery - Clear liquids for comfort  TPN  IR - successful percutaneous placement of 18-British gastrostomy tube in the antrum of the stomach   11/21/2023      Clostridium difficile infection- (present on admission)  Assessment &  Plan  Finished course of antibiotics -discussed with pharmacy    Metastatic renal cell carcinoma (HCC)- (present on admission)  Assessment & Plan  Open left radical nephrectomy 8/2/2023  Metastatic to lungs, liver, lymph nodes  Palliative care following  Follow-up with Oncology as outpatient  Dilaudid PCA    Ascites- (present on admission)  Assessment & Plan  May need diuresis vs US guided paracentesis    Hyperglycemia- (present on admission)  Assessment & Plan  Adjust SSI  hgab1c 5.4    Hypomagnesemia- (present on admission)  Assessment & Plan  Follow level     Hypokalemia- (present on admission)  Assessment & Plan  Follow bmp    Hyponatremia- (present on admission)  Assessment & Plan  Follow BMP    Leukocytosis- (present on admission)  Assessment & Plan  Follow CBC    Normocytic anemia- (present on admission)  Assessment & Plan  Follow CBC    Severe protein-calorie malnutrition (HCC)- (present on admission)  Assessment & Plan  TPN         VTE prophylaxis:   SCDs/TEDs      I have performed a physical exam and reviewed and updated ROS and Plan today (11/24/2023). In review of yesterday's note (11/23/2023), there are no changes except as documented above.

## 2023-11-24 NOTE — CARE PLAN
Problem: Knowledge Deficit - Standard  Goal: Patient and family/care givers will demonstrate understanding of plan of care, disease process/condition, diagnostic tests and medications  Outcome: Progressing     Problem: Fall Risk  Goal: Patient will remain free from falls  Outcome: Progressing     Problem: Pain - Standard  Goal: Alleviation of pain or a reduction in pain to the patient’s comfort goal  Outcome: Progressing     Problem: Mobility  Goal: Patient's capacity to carry out activities will improve  Outcome: Progressing   The patient is Watcher - Medium risk of patient condition declining or worsening    Shift Goals  Clinical Goals: pain management, monitor I&Os, drain care, mobility, monitor labs/VS  Patient Goals: comfort, rest  Family Goals: Not present    Progress made toward(s) clinical / shift goals:    Patients pain managed with PCA. Q 2 hour pain and sedation assessments in place until EOS.  Patient up to chair for 120 minutes this shift.   Juarez has + adequate output this shift.   -BM. Suppository given this shift. Will continue to monitor I&Os until EOS.  Monitor VS/labs d/t increase in WBC and drop in Hgb.

## 2023-11-24 NOTE — THERAPY
"Physical Therapy   Daily Treatment     Patient Name: Nghia Coffman Jr  Age:  63 y.o., Sex:  male  Medical Record #: [id number]  Today's Date: 11/24/2023     Precautions  Precautions: Fall Risk;PEG Tube  Comments: G tube for drainage, PCA, TPN    Assessment    Pt received in bed and agreeable to PT session. Pt's wife was present throughout and very supportive, helps with pt's care. Pt required min A for sup>sit and CGA to ambulate a short distance in the room slowly. Pt was limited by significant fatigue from even small bouts of mobility. If pt is to return home, he would benefit from a hospital bed due to decreased mobility level and risk for pressure injuries. Pt would benefit from further rehab, but may prefer return home. Will follow for acute PT to progress as able.    Plan    Treatment Plan Status: Continue Current Treatment Plan  Type of Treatment: Bed Mobility, Gait Training, Self Care / Home Evaluation, Therapeutic Activities, Therapeutic Exercise, Stair Training  Treatment Frequency: 3 Times per Week  Treatment Duration: Until Therapy Goals Met    DC Equipment Recommendations:  (has FWW and wheelchair, may benefit from hospital bed)  Discharge Recommendations: Recommend post-acute placement for additional physical therapy services prior to discharge home (if declines, will require HHPT and family assist)    Subjective    \"I'm so tired\"     Objective       11/24/23 1001   Precautions   Precautions Fall Risk;PEG Tube   Comments G tube for drainage, PCA, TPN   Pain 0 - 10 Group   Therapist Pain Assessment Post Activity Pain Same as Prior to Activity;Nurse Notified  (pain in abdomen, used PCA during session, not rated)   Cognition    Level of Consciousness Alert   Comments Pleasant and cooperative. Depressed mood evident, limited by fatigue   Balance   Sitting Balance (Static) Fair   Sitting Balance (Dynamic) Fair   Standing Balance (Static) Fair -   Standing Balance (Dynamic) Poor +   Weight Shift Sitting " Fair   Weight Shift Standing Poor   Skilled Intervention Verbal Cuing;Compensatory Strategies;Sequencing   Comments with FWW for support   Bed Mobility    Supine to Sit Minimal Assist   Scooting Standby Assist   Rolling Standby Assist   Skilled Intervention Verbal Cuing;Compensatory Strategies   Comments Requested HHA for sup>sit   Gait Analysis   Gait Level Of Assist Contact Guard Assist   Assistive Device Front Wheel Walker   Distance (Feet) 15   # of Times Distance was Traveled 1   Deviation Decreased Base Of Support;Shuffled Gait;Bradykinetic   Skilled Intervention Verbal Cuing;Sequencing;Compensatory Strategies   Comments No losses of balance, declined further gait due to fatigue   Functional Mobility   Sit to Stand Contact Guard Assist   Bed, Chair, Wheelchair Transfer Contact Guard Assist   Transfer Method Stand Step   How much difficulty does the patient currently have...   Turning over in bed (including adjusting bedclothes, sheets and blankets)? 2   Sitting down on and standing up from a chair with arms (e.g., wheelchair, bedside commode, etc.) 2   Moving from lying on back to sitting on the side of the bed? 1   How much help from another person does the patient currently need...   Moving to and from a bed to a chair (including a wheelchair)? 3   Need to walk in a hospital room? 3   Climbing 3-5 steps with a railing? 2   6 clicks Mobility Score 13   Short Term Goals    Short Term Goal # 1 pt will be able to complete functional transfers with FWW and SPV in 6tx in order to decrease fall risk   Goal Outcome # 1 goal not met   Short Term Goal # 2 pt will be able to ambulate 50ft with FWW and SPV in 6tx in order to decrease fall risk   Goal Outcome # 2 Goal not met   Short Term Goal # 3 pt will be able to negotiate 3 steps with SBA in 6tx in order to enter and exit home with spouse   Goal Outcome # 3 Goal not met   Physical Therapy Treatment Plan   Physical Therapy Treatment Plan Continue Current Treatment  Plan   Anticipated Discharge Equipment and Recommendations   DC Equipment Recommendations   (has FWW and wheelchair, may benefit from hospital bed)   Discharge Recommendations Recommend post-acute placement for additional physical therapy services prior to discharge home  (if declines, will require HHPT and family assist)   Interdisciplinary Plan of Care Collaboration   IDT Collaboration with  Nursing;Family / Caregiver   Patient Position at End of Therapy Seated;Family / Friend in Room;Tray Table within Reach;Phone within Reach   Collaboration Comments RN updated

## 2023-11-24 NOTE — PROGRESS NOTES
Report received from Paty ROBERTS, assumed care at 0645.  Pt is A0X4, and responds appropriately   Pt declines any SOB, chest pain, new onset of numbness/ tingling  Oxygen 95% on RA  Pt rates pain at 5/10, on a scale of 1-10, pt medicated per MAR  Pt has + hawkins in place for retention. + 1100 output during NOC shift  Pt has + flatus, hypoactive bowel sounds, + BM 11/19/23  Pt ambulates with a x1 assist and FWW  Pt is currently on an NPO diet, pt denies any nausea/vomiting  PEG tube CDI  Plan of care discussed, all questions answered. Explained importance of calling before getting OOB and pt verbalizes understanding. Explained importance of oral care. Call light is within reach, treaded slipper socks on, bed in lowest/ locked position, hourly rounding in place, all needs met at this time

## 2023-11-24 NOTE — DISCHARGE PLANNING
Case Management Discharge Planning    Admission Date: 11/11/2023  GMLOS: 4.6  ALOS: 13    6-Clicks ADL Score: 17  6-Clicks Mobility Score: 14  PT and/or OT Eval ordered: Yes  Post-acute Referrals Ordered: Yes  Post-acute Choice Obtained: Yes  Has referral(s) been sent to post-acute provider:  Yes      Anticipated Discharge Dispo: Discharge Disposition: D/T to home under care of home health w/planned hosp IP readmit (86)  vs Home with close OP follow up    DME Needed: Yes    DME Ordered: Yes    Action(s) Taken: Updated Provider/Nurse on Discharge Plan    This RN CM received  a call from Zahraa, Liaison with [facility].  Pt has  been declined due to Pt's [place] based [place].  Pt to discharge to Daughter 's house with Pt's wife in [facility].    No Home Health services [place].    Per Mily of [facility] , she reached out to the office of Dr Barclay who will not be back until next Monday .  Per Mily, Dr Barclay's  RN would review  Pt's TPN with Dr. Barclay and reach out to Mily /[facility] if he can follow Pt on TPN.     Per Mily ,Pt's insurance to cover Pt's TPN.     Will try [facility], requested Roxana to send referral.    This RN CM spoke with Keyana, Pt's wife and provided her the above update.   Per Keyana, after Pt's treatment their ultimate goal is to discharge to home in [place]    Escalations Completed: None    Medically Clear: No    Next Steps:   CM to continue to assist Pt with discharge as needed    Barriers to Discharge:   Medical clearance  Pending TPN set up  Pending Home Health acceptance    Is the patient up for discharge tomorrow: No

## 2023-11-25 NOTE — CARE PLAN
Problem: Communication  Goal: The ability to communicate needs accurately and effectively will improve  Outcome: Progressing     Problem: Nutrition  Goal: Patient's nutritional and fluid intake will be adequate or improve  Outcome: Progressing     Problem: Bowel Elimination  Goal: Establish and maintain regular bowel function  Outcome: Progressing       The patient is Watcher - Medium risk of patient condition declining or worsening    Shift Goals  Clinical Goals: pain management, monitor I&Os, monitor labs/VS  Patient Goals: pain management, comfort, rest  Family Goals: LUZ    Progress made toward(s) clinical / shift goals:  Pt reported pain controlled per PAC this shift. I&Os were monitored and documented. Pt rested comfortably this shift. Labs resulted. Hgb 6.8. Hospitalist notified. Orders placed. Vitals checked q4. Pt tachycardic.

## 2023-11-25 NOTE — CARE PLAN
Problem: Knowledge Deficit - Standard  Goal: Patient and family/care givers will demonstrate understanding of plan of care, disease process/condition, diagnostic tests and medications  Outcome: Progressing     Problem: Fall Risk  Goal: Patient will remain free from falls  Outcome: Progressing     Problem: Pain - Standard  Goal: Alleviation of pain or a reduction in pain to the patient’s comfort goal  Outcome: Progressing     Problem: Mobility  Goal: Patient's capacity to carry out activities will improve  Outcome: Progressing   The patient is Watcher - Medium risk of patient condition declining or worsening    Shift Goals  Clinical Goals: Pain mangement, blood, monitor VS/labs, drain care, hawkins management  Patient Goals: comfort, rest  Family Goals: LUZ    Progress made toward(s) clinical / shift goals:    Patients pain controlled with PCA. Drowsy this shift.   Patient hbg 6.8. 1 unit RBC given this shift.   WBC trending up. Will continue to monitor vs/labs throughout shift.  Hawkins with adequate UO.

## 2023-11-25 NOTE — PROGRESS NOTES
Report received from Paty ROBERTS, assumed care at 0645.  Pt is A0X4, and responds appropriately   Pt declines any SOB, chest pain, new onset of numbness/ tingling  Oxygen 92% on RA  Pt rates pain at 2/10, on a scale of 1-10, pt medicated per MAR  Pt has + hawkins in place for retention. + 1100 output during NOC shift  Pt has + flatus, hypoactive bowel sounds, + BM 11/19/23. Suppository given 11/24.  Pt ambulates with a x1 assist and FWW  Pt is currently on a clear liquid diet for comfort, pt denies any nausea/vomiting  PEG tube CDI  Plan of care discussed, all questions answered. Explained importance of calling before getting OOB and pt verbalizes understanding. Explained importance of oral care. Call light is within reach, treaded slipper socks on, bed in lowest/ locked position, hourly rounding in place, all needs met at this time

## 2023-11-25 NOTE — PROGRESS NOTES
Report received from RN, assumed care at 1845  Pt is A0X4, and responds appropriately   Pt declines any SOB, chest pain, new onset of numbness/ tingling  Pt rates pain at 5/10, on a scale of 1-10, pt medicated per PCA  Pt is voiding via hawkins   Pt has - flatus, + bowel sounds, - BM  Pt ambulates with a x2 assist with a FWW  Pt is tolerating a clear liquid diet and receiving TPN, pt denies any nausea/vomiting  Plan of care discussed, all questions answered. Explained importance of calling before getting OOB and pt verbalizes understanding. Explained importance of oral care. Call light is within reach, treaded slipper socks on, bed in lowest/ locked position, hourly rounding in place, all needs met at this time

## 2023-11-25 NOTE — PROGRESS NOTES
[facility] Medicine Daily Progress Note    Date of Service  11/25/2023    Chief Complaint  Nghia Coffman Jr is a 63 y.o. male admitted 11/11/2023 with small bowel obstruction    Hospital Course  Admitted with small bowel obstruction, known history of metastatic renal cell carcinoma, underwent left nephrectomy on 8/2/2023.  Surgery was consulted on the case.  Patient was placed on n.p.o., NG tube was placed.  He failed trial of clamping NGT and clear liquids. PICC line was placed for TPN.  Oncology was informed of the admission.  Palliative care was also consulted for advance care planning.  He was also recently diagnosed with Clostridium difficile infection and he was on oral vancomycin.  He finished a course with vancomycin enemas and IV Flagyl as he was unable to take the oral vancomycin.  Interventional radiology was consulted and patient underwent IR - successful percutaneous placement of 18-Mauritanian gastrostomy tube in the antrum of the stomach on 11/21/2023 for gastric decompression.    Interval Problem Update  SBO - clear liquids for comfort  Renal CA - patient wants to try decreasing PCA  Hyperglycemia - BS   Leukocytosis - Tmax 99  Anemia - hgb trended down to 6.8    Updates given and plan of care discussed with patient's spouse who was at bedside    I have discussed this patient's plan of care and discharge plan at IDT rounds today with Case Management, Nursing, Nursing leadership, and other members of the IDT team.    Consultants/Specialty  general surgery, oncology, palliative care, and surgery oncology,interventional radiology    Code Status  Full Code    Disposition  The patient is not medically cleared for discharge to home or a post-acute facility.  Anticipate discharge to: home with organized home healthcare and close outpatient follow-up    I have placed the appropriate orders for post-discharge needs.    Review of Systems  Review of Systems   Constitutional:  Positive for  malaise/fatigue. Negative for chills, diaphoresis and fever.   HENT:  Negative for congestion, hearing loss and sore throat.    Eyes:  Negative for blurred vision.   Respiratory:  Negative for cough, shortness of breath and wheezing.    Cardiovascular:  Negative for chest pain, palpitations and leg swelling.   Gastrointestinal:  Positive for abdominal pain. Negative for diarrhea, heartburn, nausea and vomiting.   Genitourinary:  Negative for dysuria, flank pain and hematuria.   Musculoskeletal:  Negative for back pain, joint pain, myalgias and neck pain.   Skin:  Negative for rash.   Neurological:  Positive for weakness. Negative for dizziness, sensory change, speech change, focal weakness and headaches.   Psychiatric/Behavioral:  The patient is nervous/anxious.         Physical Exam  Temp:  [36.5 °C (97.7 °F)-37.2 °C (99 °F)] 37.2 °C (99 °F)  Pulse:  [109-120] 109  Resp:  [16-20] 18  BP: (100-137)/(69-84) 112/77  SpO2:  [92 %-97 %] 94 %    Physical Exam  Vitals and nursing note reviewed.   Constitutional:       Appearance: He is ill-appearing.   HENT:      Head: Normocephalic and atraumatic.      Nose: No congestion.      Mouth/Throat:      Mouth: Mucous membranes are moist.   Eyes:      Extraocular Movements: Extraocular movements intact.      Conjunctiva/sclera: Conjunctivae normal.   Cardiovascular:      Rate and Rhythm: Normal rate and regular rhythm.   Pulmonary:      Effort: Pulmonary effort is normal.      Breath sounds: Normal breath sounds.   Abdominal:      General: There is distension.      Tenderness: There is abdominal tenderness. There is no guarding or rebound.      Comments: G-tube   Musculoskeletal:      Cervical back: No tenderness.      Right lower leg: No edema.      Left lower leg: No edema.   Skin:     General: Skin is warm and dry.   Neurological:      General: No focal deficit present.      Mental Status: He is alert and oriented to person, place, and time.      Cranial Nerves: No cranial  nerve deficit.   Psychiatric:         Mood and Affect: Mood is anxious.         Speech: Speech is delayed.         Cognition and Memory: He exhibits impaired recent memory.         Fluids    Intake/Output Summary (Last 24 hours) at 11/25/2023 1433  Last data filed at 11/25/2023 1258  Gross per 24 hour   Intake 892.89 ml   Output 2125 ml   Net -1232.11 ml       Laboratory  Recent Labs     11/23/23  0520 11/24/23  0308 11/24/23  1030 11/25/23  0535   WBC 21.4* 21.8*  --  24.8*   RBC 2.99* 2.65*  --  2.51*   HEMOGLOBIN 8.1* 7.0* 7.2* 6.8*   HEMATOCRIT 26.6* 23.2*  --  22.3*   MCV 89.0 87.5  --  88.8   MCH 27.1 26.4*  --  27.1   MCHC 30.5* 30.2*  --  30.5*   RDW 58.8* 58.0*  --  59.7*   PLATELETCT 406 383  --  366   MPV 9.8 10.3  --  10.2     Recent Labs     11/23/23  0520 11/24/23  0759 11/25/23  0535   SODIUM 132* 136 138   POTASSIUM 4.2 4.7 4.6   CHLORIDE 95* 100 101   CO2 27 25 25   GLUCOSE 234* 275* 238*   BUN 24* 35* 37*   CREATININE 0.60 0.74 0.81   CALCIUM 8.8 8.7 8.6     Recent Labs     11/24/23  1030   INR 1.12               Imaging  CT-ABDOMEN-PELVIS WITH   Final Result      1.  Large mass is centered to the midline in the upper abdomen. Neoplasm is most likely. Neoplasm arising from the pancreas is a possibility since the distal pancreas is not visualized. Pancreas could also be compressed by mass of other origin.      2.  The mass encases the superior mesenteric and celiac arteries and abuts the superior mesenteric vein. The mass extends into the vick hepatis.      3.  Liver masses are present which are suspicious for metastases.      4.  Bilateral pleural effusions, larger on the left side.      5.  Free fluid is present in the abdomen and pelvis.      IR-GASTROSTOMY PLACEMENT   Final Result         Technically successful percutaneous placement of 18-Sinhala gastrostomy tube in the antrum of the stomach.      DX-ABDOMEN FOR TUBE PLACEMENT   Final Result      1.  NG tube tip projects at the stomach.   2.   Small left pleural effusion with adjacent airspace disease.      [id number] VIEW   Final Result      No acute process.      [id number] VIEW   Final Result      1.  Contrast in the colon and rectum.   2.  Broad sweep of the enteric tube in keeping with the patient's known large infradiaphragmatic mass                  IR-PICC LINE PLACEMENT W/ GUIDANCE > AGE 5   Final Result                  Ultrasound-guided PICC placement performed by qualified nursing staff as    above.          DX-SMALL BOWEL SERIES   Final Result      1. High-grade distal mechanical small bowel obstruction, likely in the ileum.   2. No complete bowel obstruction.      I, Dr. Del Piña, discussed the results of this examination directly by phone with Dr. Montanez on 11/14/2023 at 1627 hours.      DX-ABDOMEN FOR TUBE PLACEMENT   Final Result      1.  Enteric tube has been adjusted and now projects over the gastric body in satisfactory position.      DX-ABDOMEN FOR TUBE PLACEMENT   Final Result      1.  Presumed enteric tube projects over the upper mid thorax likely in the thoracic esophagus above the level of the aster. It is  recommended this be removed and reinserted or readjusted.      2.  Findings were originally called to the patient's nurse on 11/12/2023 at 1:23 AM however she was unavailable due to assisting another patient. Findings were discussed with the patient's nurse Haja, 1:45 AM on 11/12/2023.      OUTSIDE IMAGES-CT ABDOMEN /PELVIS   Final Result           Assessment/Plan  * SBO (small bowel obstruction) (HCC)- (present on admission)  Assessment & Plan  Diet per Surgery - Clear liquids for comfort  TPN  IR - successful percutaneous placement of 18-Citizen of [place] gastrostomy tube in the antrum of the stomach   11/21/2023      Clostridium difficile infection- (present on admission)  Assessment & Plan  Finished course of antibiotics -discussed with pharmacy    Metastatic renal cell carcinoma (HCC)- (present on  admission)  Assessment & Plan  Open left radical nephrectomy 8/2/2023  Metastatic to lungs, liver, lymph nodes  Palliative care following  Follow-up with Oncology as outpatient  Adjust Dilaudid PCA    Ascites- (present on admission)  Assessment & Plan  monitor    Hyperglycemia- (present on admission)  Assessment & Plan  SSI  hgab1c 5.4    Hypomagnesemia- (present on admission)  Assessment & Plan  Follow level     Hypokalemia- (present on admission)  Assessment & Plan  Follow bmp    Hyponatremia- (present on admission)  Assessment & Plan  Follow BMP    Leukocytosis- (present on admission)  Assessment & Plan  Follow CBC    Normocytic anemia- (present on admission)  Assessment & Plan  Transfuse 1 u PRBC  Follow CBC    Severe protein-calorie malnutrition (HCC)- (present on admission)  Assessment & Plan  TPN         VTE prophylaxis:   SCDs/TEDs      I have performed a physical exam and reviewed and updated ROS and Plan today (11/25/2023). In review of yesterday's note (11/24/2023), there are no changes except as documented above.

## 2023-11-26 NOTE — PROGRESS NOTES
Bedside report received from dayshift RN, assumed care at 1900.  Assessment complete.  A&O x 4. Patient calls appropriately.  Patient ambulates with x1 assist. Bed alarm on.   Patient denies pain at this time, dilaudid PCA in place  Denies N&V. Tolerating clear liquid diet but low intake. Cyclic TPN.  LLQ Peg tube to drain bag.  + void via hawkins catheter, + flatus, + BM.  Patient denies SOB. On room air.  Patient calm, pleasant, and cooperative.  Review plan with of care with patient. Call light and personal belongings within reach. Hourly rounding in place. All needs met at this time.

## 2023-11-26 NOTE — PROGRESS NOTES
RN rounded on patient and noticed patient's HR increased to 140s sustaining. Patient complaining of pain, using PCA but still groaning and distracted. Juarez output martha. Hospitalist notified, orders placed

## 2023-11-26 NOTE — CARE PLAN
The patient is Stable - Low risk of patient condition declining or worsening    Shift Goals  Clinical Goals: Pain control, monitor labs  Patient Goals: Comfort, rest  Family Goals: LUZ    Progress made toward(s) clinical / shift goals:  Pain controlled with dilaudid PCA, patient able to sleep intermittently. Labs monitored, kidney function decreased. Infusions ordered.    Patient is not progressing towards the following goals:

## 2023-11-26 NOTE — PROGRESS NOTES
Pharmacy TPN Note for 11/26/2023     Received discontinuation orders for TPN as patient transitions to comfort care. Pharmacy will sign off on management of TPN.     Lorena Baeza, KarenD

## 2023-11-26 NOTE — PROGRESS NOTES
NOC HOSPITALIST CROSS COVER    Notified by RN regarding patient tachycardic in the 140s overnight.  Orders placed for CBC with differential, EKG, CMP, continuous remote cardiac monitoring, 500 cc LR bolus.  EKG notable for sinus tachycardia.  CBC notable for anemia however is improved from yesterday after receiving blood transfusion as well as persistent leukocytosis.  Patient did appear to respond generally well to IV fluid bolus and over the course of the night received 1.5 L.  Will also start patient on LR infusion.  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  Suspect volume down for a number of reasons including management of SBO.    A/P:  # Sinus tachycardia  -Suspect hypovolemia possibly in the setting of SBO management, no obvious signs of infection at this time  -Patient responded well to 1.5 L total LR overnight, started on LR infusion at 100 mL/h to run over 10 hours for 1 additional liter  -Consider workup for persistent infection if clinically appropriate        -----------------------------------------------------------------------------------------------------------    Electronically signed by:  WILLY Tanner PA-C  Hospitalist Services

## 2023-11-27 NOTE — DIETARY
Nutrition Services:  Brief Update    Pt previously receiving TPN for nutrition support.  Per chart review, pt has transitioned to comfort care.  TPN discontinued yesterday. Pt continues on a clear liquid diet. RD will re-screen pt weekly.  Consult RD as needed.    RD available PRN.

## 2023-11-27 NOTE — PROGRESS NOTES
[facility] Medicine Daily Progress Note    Date of Service  11/26/2023    Chief Complaint  Nghia Coffman Jr is a 63 y.o. male admitted 11/11/2023 with small bowel obstruction    Hospital Course  Admitted with small bowel obstruction, known history of metastatic renal cell carcinoma, underwent left nephrectomy on 8/2/2023.  Surgery was consulted on the case.  Patient was placed on n.p.o., NG tube was placed.  He failed trial of clamping NGT and clear liquids. PICC line was placed for TPN.  Oncology was informed of the admission.  Palliative care was also consulted for advance care planning.  He was also recently diagnosed with Clostridium difficile infection and he was on oral vancomycin.  He finished a course with vancomycin enemas and IV Flagyl as he was unable to take the oral vancomycin.  Interventional radiology was consulted and patient underwent IR - successful percutaneous placement of 18-Panamanian gastrostomy tube in the antrum of the stomach on 11/21/2023 for gastric decompression.  Patient is NGT was removed, he was started on clear liquids for comfort.  His pain was uncontrolled, he required Dilaudid PCA pump.    Interval Problem Update  Patient looks ill, very weak, he has some confusion.  His pain does not seem controlled as he is grimacing.  Lengthy discussion with patient's family who are at bedside, patient with very poor prognosis due to the metastatic renal cancer.  Initially was trying to wean off Dilaudid PCA pump so he could go home with home health and TPN however has struggled to do this due to increasing pain.  Family has decided to place him on DNR/DNI and comfort care measures.    I have discussed this patient's plan of care and discharge plan at IDT rounds today with Case Management, Nursing, Nursing leadership, and other members of the IDT team.    Consultants/Specialty  general surgery, oncology, palliative care, and surgery oncology,interventional radiology    Code Status  Comfort  Care/DNR    Disposition  The patient is not medically cleared for discharge to home or a post-acute facility.  Anticipate discharge to: hospice    I have placed the appropriate orders for post-discharge needs.    Review of Systems  Review of Systems   Constitutional:  Positive for chills and malaise/fatigue. Negative for diaphoresis and fever.   HENT:  Negative for congestion, hearing loss and sore throat.    Eyes:  Negative for blurred vision.   Respiratory:  Positive for shortness of breath. Negative for cough and wheezing.    Cardiovascular:  Negative for chest pain, palpitations and leg swelling.   Gastrointestinal:  Positive for abdominal pain. Negative for diarrhea, heartburn, nausea and vomiting.   Genitourinary:  Negative for dysuria, flank pain and hematuria.   Musculoskeletal:  Negative for back pain, joint pain, myalgias and neck pain.   Skin:  Negative for rash.   Neurological:  Positive for weakness. Negative for dizziness, sensory change, speech change, focal weakness and headaches.   Psychiatric/Behavioral:  The patient is nervous/anxious.         Physical Exam  Temp:  [36.3 °C (97.3 °F)-38.2 °C (100.8 °F)] 38.2 °C (100.8 °F)  Pulse:  [] 129  Resp:  [17-22] 17  BP: ()/(63-92) 110/63  SpO2:  [91 %-96 %] 91 %    Physical Exam  Vitals and nursing note reviewed.   Constitutional:       Appearance: He is ill-appearing and toxic-appearing.   HENT:      Head: Normocephalic and atraumatic.      Nose: No congestion.      Mouth/Throat:      Mouth: Mucous membranes are dry.   Eyes:      Extraocular Movements: Extraocular movements intact.      Conjunctiva/sclera: Conjunctivae normal.   Cardiovascular:      Rate and Rhythm: Regular rhythm. Tachycardia present.   Pulmonary:      Effort: Accessory muscle usage present.      Comments: Coarse breath sounds  Abdominal:      General: There is distension.      Tenderness: There is abdominal tenderness. There is no guarding or rebound.      Comments: G-tube    Musculoskeletal:      Cervical back: No tenderness.      Right lower leg: No edema.      Left lower leg: No edema.   Skin:     General: Skin is warm and dry.   Neurological:      General: No focal deficit present.      Mental Status: He is alert. He is confused.      Cranial Nerves: No cranial nerve deficit.      Comments: Oriented to person   Psychiatric:         Mood and Affect: Mood is anxious.         Speech: Speech is delayed.         Cognition and Memory: Cognition is impaired. He exhibits impaired recent memory.         Fluids    Intake/Output Summary (Last 24 hours) at 11/26/2023 1622  Last data filed at 11/26/2023 1111  Gross per 24 hour   Intake 2173.82 ml   Output 1925 ml   Net 248.82 ml         Laboratory  Recent Labs     11/24/23  0308 11/24/23  1030 11/25/23  0535 11/26/23  0047   WBC 21.8*  --  24.8* 26.0*   RBC 2.65*  --  2.51* 2.74*   HEMOGLOBIN 7.0* 7.2* 6.8* 7.4*   HEMATOCRIT 23.2*  --  22.3* 24.2*   MCV 87.5  --  88.8 88.3   MCH 26.4*  --  27.1 27.0   MCHC 30.2*  --  30.5* 30.6*   RDW 58.0*  --  59.7* 63.2*   PLATELETCT 383  --  366 254   MPV 10.3  --  10.2 10.1       Recent Labs     11/25/23  0535 11/25/23  2240 11/26/23  0047   SODIUM 138 139 140   POTASSIUM 4.6 4.9 4.4   CHLORIDE 101 101 102   CO2 25 21 24   GLUCOSE 238* 107* 170*   BUN 37* 38* 41*   CREATININE 0.81 0.95 1.10   CALCIUM 8.6 8.8 8.8       Recent Labs     11/24/23  1030   INR 1.12                 Imaging  CT-ABDOMEN-PELVIS WITH   Final Result      1.  Large mass is centered to the midline in the upper abdomen. Neoplasm is most likely. Neoplasm arising from the pancreas is a possibility since the distal pancreas is not visualized. Pancreas could also be compressed by mass of other origin.      2.  The mass encases the superior mesenteric and celiac arteries and abuts the superior mesenteric vein. The mass extends into the vick hepatis.      3.  Liver masses are present which are suspicious for metastases.      4.  Bilateral  pleural effusions, larger on the left side.      5.  Free fluid is present in the abdomen and pelvis.      IR-GASTROSTOMY PLACEMENT   Final Result         Technically successful percutaneous placement of 18-Bengali gastrostomy tube in the antrum of the stomach.      DX-ABDOMEN FOR TUBE PLACEMENT   Final Result      1.  NG tube tip projects at the stomach.   2.  Small left pleural effusion with adjacent airspace disease.      [id number] VIEW   Final Result      No acute process.      [id number]-0 VIEW   Final Result      1.  Contrast in the colon and rectum.   2.  Broad sweep of the enteric tube in keeping with the patient's known large infradiaphragmatic mass                  IR-PICC LINE PLACEMENT W/ GUIDANCE > AGE 5   Final Result                  Ultrasound-guided PICC placement performed by qualified nursing staff as    above.          DX-SMALL BOWEL SERIES   Final Result      1. High-grade distal mechanical small bowel obstruction, likely in the ileum.   2. No complete bowel obstruction.      I, Dr. Del Piña, discussed the results of this examination directly by phone with Dr. Montanez on 11/14/2023 at 1627 hours.      DX-ABDOMEN FOR TUBE PLACEMENT   Final Result      1.  Enteric tube has been adjusted and now projects over the gastric body in satisfactory position.      DX-ABDOMEN FOR TUBE PLACEMENT   Final Result      1.  Presumed enteric tube projects over the upper mid thorax likely in the thoracic esophagus above the level of the aster. It is  recommended this be removed and reinserted or readjusted.      2.  Findings were originally called to the patient's nurse on 11/12/2023 at 1:23 AM however she was unavailable due to assisting another patient. Findings were discussed with the patient's nurse Haja, 1:45 AM on 11/12/2023.      OUTSIDE IMAGES-CT ABDOMEN /PELVIS   Final Result           Assessment/Plan  * SBO (small bowel obstruction) (HCC)- (present on admission)  Assessment & Plan  Diet  per Surgery - Clear liquids for comfort  Stop TPN  IR - successful percutaneous placement of 18-[place]olan gastrostomy tube in the antrum of the stomach   11/21/2023  Comfort care      Clostridium difficile infection- (present on admission)  Assessment & Plan  Finished course of antibiotics    Metastatic renal cell carcinoma (HCC)- (present on admission)  Assessment & Plan  Open left radical nephrectomy 8/2/2023  Metastatic to lungs, liver, lymph nodes  Adjust Dilaudid PCA  Comfort care    Ascites- (present on admission)  Assessment & Plan  Comfort care    Hyperglycemia- (present on admission)  Assessment & Plan  Comfort care    Hypomagnesemia- (present on admission)  Assessment & Plan  Comfort care    Hypokalemia- (present on admission)  Assessment & Plan  Comfort care    Hyponatremia- (present on admission)  Assessment & Plan  Comfort care    Leukocytosis- (present on admission)  Assessment & Plan  Comfort care    Normocytic anemia- (present on admission)  Assessment & Plan  Transfused PRBC  Comfort care    Severe protein-calorie malnutrition (HCC)- (present on admission)  Assessment & Plan  Stop TPN  Comfort care         VTE prophylaxis:    pharmacologic prophylaxis contraindicated due to Comfort care      I have performed a physical exam and reviewed and updated ROS and Plan today (11/26/2023). In review of yesterday's note (11/25/2023), there are no changes except as documented above.

## 2023-11-27 NOTE — PROGRESS NOTES
Report received from AM RN; assumed care. Family bedside at present time. Patient on comfort care. Patient in/out of consciousness at beginning of shift. Patient able to open his eyes, nodded with some questioning. LUZ orientation status, noted to self when addressed by name. VSS thus far, intermittent tachycardia. 93% on RA. Noisy breathing with intermittent apnea, noted at beginning of shift. BLE restlessness/mild tremors, calmed with PCA medication administration. Family instructed to notify this RN if tremors/restlessness worsens. Hourly rounding in place. Abdomen round, distended. J tube attached to gravity bag, minimal brown drainage noted. + void; martha urine noted in hawkins. Ice chips provided. Mouth swabs/oral care attempted, patients eyes opened wide, noted refusal. Patient unable to open/close mouth on command. Patient bedbound. Repositioned as tolerating. Discussed plan of care/process with family members. Cookie tray/coffee delivered by dietary. Questions answered.  High fall risk. Bed/strip alarm dis-engaged. Bed in locked/lowest position.  Call light/personal belongings within family's reach.  All needs met at present time. .

## 2023-11-27 NOTE — PROGRESS NOTES
[facility] Daily Progress Note    Date of Service  11/27/2023    Chief Complaint  Nghia Coffman Jr is a 63 y.o. male admitted 11/11/2023 with small bowel obstruction    Hospital Course  Admitted with small bowel obstruction, known history of metastatic renal cell carcinoma, underwent left nephrectomy on 8/2/2023.  Surgery was consulted on the case.  Patient was placed on n.p.o., NG tube was placed.  He failed trial of clamping NGT and clear liquids. PICC line was placed for TPN.  Oncology was informed of the admission.  Palliative care was also consulted for advance care planning.  He was also recently diagnosed with Clostridium difficile infection and he was on oral vancomycin.  He finished a course with vancomycin enemas and IV Flagyl as he was unable to take the oral vancomycin.  Interventional radiology was consulted and patient underwent IR - successful percutaneous placement of 18-[place]ian gastrostomy tube in the antrum of the stomach on 11/21/2023 for gastric decompression.  Patient's NGT was removed, he was started on clear liquids for comfort.  His pain was uncontrolled, he required Dilaudid PCA pump.     Interval Problem Update  Appears comfortable.  Family at bedside.    I have discussed this patient's plan of care and discharge plan at IDT rounds today with Case Management, Nursing, Nursing leadership, and other members of the IDT team.    Consultants/Specialty  general surgery, oncology, palliative care, and surgery oncology,interventional radiology    Code Status  Comfort Care/DNR    Disposition  The patient is not medically cleared for discharge to home or a post-acute facility.  Anticipate discharge to: hospice    I have placed the appropriate orders for post-discharge needs.    Review of Systems  Review of Systems   Unable to perform ROS: Medical condition        Physical Exam  Temp:  [36.8 °C (98.2 °F)-39.4 °C (102.9 °F)] 39.4 °C (102.9 °F)  Pulse:  [103-133] 133  Resp:  [17] 17  BP:  ()/(41-66) 69/41  SpO2:  [86 %-95 %] 86 %    Physical Exam  Vitals and nursing note reviewed.   Constitutional:       Appearance: He is ill-appearing and toxic-appearing.   HENT:      Head: Normocephalic and atraumatic.      Nose: No congestion.      Mouth/Throat:      Mouth: Mucous membranes are dry.   Eyes:      Extraocular Movements: Extraocular movements intact.      Conjunctiva/sclera: Conjunctivae normal.   Cardiovascular:      Rate and Rhythm: Regular rhythm. Tachycardia present.   Pulmonary:      Effort: Accessory muscle usage present.      Comments: Coarse breath sounds  Abdominal:      General: There is distension.      Comments: G-tube   Musculoskeletal:      Cervical back: No tenderness.      Right lower leg: No edema.      Left lower leg: No edema.   Skin:     General: Skin is warm and dry.   Neurological:      Mental Status: He is lethargic.      Comments: lethargic         Fluids    Intake/Output Summary (Last 24 hours) at 11/27/2023 1448  Last data filed at 11/27/2023 0806  Gross per 24 hour   Intake 41.85 ml   Output 1110 ml   Net -1068.15 ml         Laboratory  Recent Labs     11/25/23  0535 11/26/23  0047   WBC 24.8* 26.0*   RBC 2.51* 2.74*   HEMOGLOBIN 6.8* 7.4*   HEMATOCRIT 22.3* 24.2*   MCV 88.8 88.3   MCH 27.1 27.0   MCHC 30.5* 30.6*   RDW 59.7* 63.2*   PLATELETCT 366 254   MPV 10.2 10.1       Recent Labs     11/25/23  0535 11/25/23  2240 11/26/23  0047   SODIUM 138 139 140   POTASSIUM 4.6 4.9 4.4   CHLORIDE 101 101 102   CO2 25 21 24   GLUCOSE 238* 107* 170*   BUN 37* 38* 41*   CREATININE 0.81 0.95 1.10   CALCIUM 8.6 8.8 8.8                       Imaging  CT-ABDOMEN-PELVIS WITH   Final Result      1.  Large mass is centered to the midline in the upper abdomen. Neoplasm is most likely. Neoplasm arising from the pancreas is a possibility since the distal pancreas is not visualized. Pancreas could also be compressed by mass of other origin.      2.  The mass encases the superior mesenteric  and celiac arteries and abuts the superior mesenteric vein. The mass extends into the vick hepatis.      3.  Liver masses are present which are suspicious for metastases.      4.  Bilateral pleural effusions, larger on the left side.      5.  Free fluid is present in the abdomen and pelvis.      IR-GASTROSTOMY PLACEMENT   Final Result         Technically successful percutaneous placement of 18-Slovenian gastrostomy tube in the antrum of the stomach.      DX-ABDOMEN FOR TUBE PLACEMENT   Final Result      1.  NG tube tip projects at the stomach.   2.  Small left pleural effusion with adjacent airspace disease.      XR-EIIFTWC-1 VIEW   Final Result      No acute process.      RG-PLYNXKY-7 VIEW   Final Result      1.  Contrast in the colon and rectum.   2.  Broad sweep of the enteric tube in keeping with the patient's known large infradiaphragmatic mass                  IR-PICC LINE PLACEMENT W/ GUIDANCE > AGE 5   Final Result                  Ultrasound-guided PICC placement performed by qualified nursing staff as    above.          DX-SMALL BOWEL SERIES   Final Result      1. High-grade distal mechanical small bowel obstruction, likely in the ileum.   2. No complete bowel obstruction.      I, Dr. Del Piña, discussed the results of this examination directly by phone with Dr. Montanez on 11/14/2023 at 1627 hours.      DX-ABDOMEN FOR TUBE PLACEMENT   Final Result      1.  Enteric tube has been adjusted and now projects over the gastric body in satisfactory position.      DX-ABDOMEN FOR TUBE PLACEMENT   Final Result      1.  Presumed enteric tube projects over the upper mid thorax likely in the thoracic esophagus above the level of the aster. It is  recommended this be removed and reinserted or readjusted.      2.  Findings were originally called to the patient's nurse on 11/12/2023 at 1:23 AM however she was unavailable due to assisting another patient. Findings were discussed with the patient's nurse Haja  1:45 AM on 11/12/2023.      OUTSIDE IMAGES-CT ABDOMEN /PELVIS   Final Result           Assessment/Plan  * SBO (small bowel obstruction) (HCC)- (present on admission)  Assessment & Plan  Diet per Surgery - Clear liquids for comfort  Stop TPN  IR - successful percutaneous placement of 18-Pashto gastrostomy tube in the antrum of the stomach   11/21/2023  Comfort care      Clostridium difficile infection- (present on admission)  Assessment & Plan  Finished course of antibiotics    Metastatic renal cell carcinoma (HCC)- (present on admission)  Assessment & Plan  Open left radical nephrectomy 8/2/2023  Metastatic to lungs, liver, lymph nodes  Dilaudid PCA  Comfort care    Ascites- (present on admission)  Assessment & Plan  Comfort care    Hyperglycemia- (present on admission)  Assessment & Plan  Comfort care    Hypomagnesemia- (present on admission)  Assessment & Plan  Comfort care    Hypokalemia- (present on admission)  Assessment & Plan  Comfort care    Hyponatremia- (present on admission)  Assessment & Plan  Comfort care    Leukocytosis- (present on admission)  Assessment & Plan  Comfort care    Normocytic anemia- (present on admission)  Assessment & Plan  Transfused PRBC  Comfort care    Severe protein-calorie malnutrition (HCC)- (present on admission)  Assessment & Plan  Comfort care         VTE prophylaxis:    pharmacologic prophylaxis contraindicated due to Comfort care      I have performed a physical exam and reviewed and updated ROS and Plan today (11/27/2023). In review of yesterday's note (11/26/2023), there are no changes except as documented above.

## 2023-11-27 NOTE — CARE PLAN
The patient is Unstable - High likelihood or risk of patient condition declining or worsening    Shift Goals  Clinical Goals: Pain control, Q2 turns, bereavement tray, education, comfort.  Patient Goals: LUZ  Family Goals: Comfort    Progress made toward(s) clinical / shift goals:  PCA/bolus button effective for pain; see MAR. Patient turning himself while in bed, primarily BLE. Bereavement tray delivered. Family members bedside, one person present at all times. Education provided with interventions. Patient slept most of shift, with small bouts of wakefulness. Ativan administered once to assist with restlessness/moderate tremors.     Patient is not progressing towards the following goals: NA.

## 2023-11-27 NOTE — CARE PLAN
The patient is Stable - Low risk of patient condition declining or worsening    Shift Goals  Clinical Goals: Pain Mgt/ Comfort  Patient Goals: LUZ  Family Goals: LUZ    Progress made toward(s) clinical / shift goals:      Problem: Knowledge Deficit - Standard  Goal: Patient and family/care givers will demonstrate understanding of plan of care, disease process/condition, diagnostic tests and medications  Outcome: Not Progressing - LUZ pt knowledge      Problem: Fall Risk  Goal: Patient will remain free from falls  Outcome: Progressing     Problem: Pain - Standard  Goal: Alleviation of pain or a reduction in pain to the patient’s comfort goal  Outcome: Progressing     Problem: Gastrointestinal Irritability  Goal: Nausea and vomiting will be absent or improve  Outcome: Progressing

## 2023-11-27 NOTE — PROGRESS NOTES
Bedside report received; assumed care.  Family at bedside. Patient is comfort care and not responding at this time. LUZ orientation.   PCA in place for comfort. Family aware to notify this RN if restlessness/agitation worsens.   J tube attached to gravity bag, minimal brown drainage noted. + void; martha urine noted in hawkins.   Repositioning as tolerated.   Review plan with of care with family. Call light and personal belongings within family's reach. Hourly rounding in place. All needs met at this time.

## 2023-11-27 NOTE — PROGRESS NOTES
Bedside report received, assessment completed    A&O x  LUZ, pt calls appropriately  Mobility: Unable to mobilize, pt comfort care  Fall Risk Assessment: HIGH, bed alarm n/a, door notifications yes  Pain Assessment / Reassessment completed, medication provided per MAR  Diet: CLD, not tolerating   LDA:   IV Access: PIC LUE, CDI/ flushed/ SL/ Infusing PCA  PEG: LLQ    GI/: hawkins in place void, + flatus, 11/25 BM  DVT Prophylaxis: CI, SCD pt on comfort care  Tk Score: 14, Interventions per flow sheet  Procedures:    - n/a  D/C Plan:    - Pt code status changed today from Full to Comfort Care    Reviewed plan of care with patient, bed in lowest position and locked, pt resting comfortably now, call light within reach, all needs met at this time. Interventions will be executed per plan of care

## 2023-11-28 NOTE — CARE PLAN
The patient is Unstable - High likelihood or risk of patient condition declining or worsening    Shift Goals  Clinical Goals: comfort  Patient Goals: LUZ  Family Goals: comfort    Progress made toward(s) clinical / shift goals:  Pt on comfort care. PCA in place. PRN's given per MAR.

## 2023-11-28 NOTE — CARE PLAN
The patient is Unstable - High likelihood or risk of patient condition declining or worsening    Shift Goals  Clinical Goals: Comfort  Patient Goals: LUZ  Family Goals: Comfort    Progress made toward(s) clinical / shift goals:  Medicated per MAR

## 2023-11-28 NOTE — PROGRESS NOTES
Weight: 72.4 kg (159 lb 9.8 oz)    Isolation:Sporicidal      --------------------PRONOUNCEMENT--------------------   Death Date: 23   Death Time:                            Pronounced By (RN1): Paty Ramirez RN      Pronounced By (RN2): Marcia Montana RN    ------------------------------------FAMILY NOTIFICATION--------------------------------------------  Family Notified Name (1): Keyana Coffman  Time:     Relationship: spouse  How Notified: In Person                                  --------------------PHYSICIAN NOTIFICATION----------------  Physician Name(1): JERRELL Mercado   Time:    How Notified: Phone                        -------------------------, AUTOPSY  & MORTUARY--------------------------                Name  Notified ([phone]): Shantell Sullivan   Time  Notified:                Accepted Case: No  Autopsy: No                             Mortuary:  ([facility])    -------------------------ORGAN/TISSUE DONATION-------------------------  Tissue Bank: [facility] [place] [phone] or [phone]: Notified    Name Person Spoke With: David  Donor: No    -------------------------BELONGINGS-------------------------                            All taken by family                             -------------------------IDENTIFICATION-------------------------                     ID Verified with Patient's Armband: __Paty Ramirez, RN___________                                                                 (RN Signature)                                                                                _____________________________                                                           (Transport Signature if Applicable)    ------------------ OK PICKUP --------------------

## 2023-11-28 NOTE — PROGRESS NOTES
Report received from RN, assumed care at 1845  Pt is on comfort care with family at bedside. Family allowing partial assessment. Pt responds to painful stimuli only. Pt on dilaudid PCA. Family refusing turns. Pt presenting restless, labored breathing. Medicated per MAR. Juarez and G tube in place. All needs met at this time. Plan of care discussed with family at bedside.

## 2023-11-28 NOTE — DISCHARGE SUMMARY
Death Summary    Cause of Death  Small bowel obstruction due to metastatic renal cell carcinoma    Comorbid Conditions at the Time of Death  Principal Problem:    SBO (small bowel obstruction) (HCC) (POA: Yes)  Active Problems:    Metastatic renal cell carcinoma (HCC) (POA: Yes)    Clostridium difficile infection (POA: Yes)    Malignant retroperitoneal tumor (HCC) (POA: Yes)    Severe protein-calorie malnutrition (HCC) (POA: Yes)    Normocytic anemia (POA: Yes)    Leukocytosis (POA: Yes)    Hyponatremia (POA: Yes)    Hypokalemia (POA: Yes)    Hypomagnesemia (POA: Yes)    Hyperglycemia (POA: Yes)    Ascites (POA: Yes)  Resolved Problems:    * No resolved hospital problems. *      History of Presenting Illness and Hospital Course  This is a 63 y.o. male admitted 11/11/2023 with  small bowel obstruction, known history of metastatic renal cell carcinoma, underwent left nephrectomy on 8/2/2023.  Surgery was consulted on the case.  Patient was placed on n.p.o., NG tube was placed.  He failed trial of clamping of NGT and clear liquids. PICC line was placed for TPN.  Oncology was informed of the admission.  Palliative care was also consulted for advance care planning. He was also recently diagnosed with Clostridium difficile infection and he was on oral vancomycin.  He finished a course with vancomycin enemas and IV Flagyl as he was unable to take the oral vancomycin.  Interventional radiology was consulted and patient underwent IR - successful percutaneous placement of 18-Khmer gastrostomy tube in the antrum of the stomach on 11/21/2023 for gastric decompression.  Patient's NGT was removed, he was started on clear liquids for comfort.  His pain was uncontrolled, he required Dilaudid PCA pump and continued adjustment for pain control.  Palliative care continue to follow the patient.  His clinical patient deteriorated, he appeared weak and very frail.  He started developing fever and shortness of breath.  Lengthy discussion  was done with patient's family with regards to his very poor prognosis.  Patient's spouse decided to place him on comfort care measures only.    Death Date: 11/27/23   Death Time: 2211         Pronounced By (RN1): Paty Ramirez RN  Pronounced By (RN2): Marcia Montana RN

## 2024-03-27 NOTE — ASSESSMENT & PLAN NOTE
Pharmacy comment: PLEASE SEND NEW RX WITH DIRECTIONS IN SIG.  PLEASE SEND NEW RX WITH DIRECTIONS IN SIG. WAS SENT WITH SIG THAT SAYS \"TO BE FILLED BY PROVIDER\"       New Rx sent    -Pt is s/p complete left nephrectomy on 8/2/2023, performed by Dr. Love. Indication was RCC of the left kidney.   -Cr trended down since admission, now at baseline  -Avoid nephrotoxins, NSAIDS   In 1-3 days Please follow-up with:     [facility]  Occupational Health  [address]  [place] [address]  Phone: [phone]    Home, Soap water wash antibiotic ointment dressing changes twice daily, Augmentin 875 mg twice daily for 5 days, follow-up with occupational health or return for problems or symptoms of cat scratch disease.    Common symptoms of cat scratch disease include:   Infected or swollen scratch or bite  Skin rash  Swollen lymph nodes around your head, neck, or armpits  Fever  Headache  Severe tiredness (fatigue)  Loss of appetite  Muscle aches  Belly (abdominal) pain  Weight loss

## 2025-05-08 NOTE — OR NURSING
Patient allergies and NPO status verified, home medication reconciliation completed and belongings secured. Patient verbalizes understanding of pain scale, expected course of stay and plan of care. Surgical site verified with patient. IV access established; call light within reach. No further needs at this time; hourly rounding.     
Patient arrived in PACU, VSS. Abd incision covered with telpha and medipore tape, CDI. SARAH drain present on left side draining properly. NG tube present hooked up to suction with very little output. Instructed by Dr. Garcia if not much output does not need to be hooked up.   Epidural initiated with ALEJANDRA Velazquez. Juarez present, draining properly.   CBC and BMP collected and sent to lab. Recollected BMP sent to lab.   Updated Dr. Jackson of patient status, gave permission to remove NG tube. Medicated with pain medication per MAR after reviewing with doctor about additional pain medication with epidural running.   Updated Pat, significant other, on POC and future room #  Removed NG tube.   Report called to Dorota ROBERTS.  Transported patient to T430 via transport on room air.    Verified with Dr. Garcia ok to transfer pt to floor with current BP as long as asymptomatic.   
[Follow-up Visit ___] : a follow-up visit  for [unfilled]

## (undated) DEVICE — SPONGE PEANUT - (5/PK 50PK/CA)

## (undated) DEVICE — SUTURE 1 PDS II PLUS TP-1 - (12PK/BX)

## (undated) DEVICE — DRAIN J-VAC 10MM FLAT - (10/CA)

## (undated) DEVICE — SUTURE 4-0 PROLENE SH 36 (36PK/BX)"

## (undated) DEVICE — BOVIE  BLADE 6 EXTENDED - (50/PK)

## (undated) DEVICE — WATER IRRIG. STER 3000 ML - (4/CA)

## (undated) DEVICE — KIT RADIAL ARTERY 20GA W/MAX BARRIER AND BIOPATCH  (5EA/CA) #10740 IS FOR THE SET RADIAL ARTERIAL

## (undated) DEVICE — SUCTION INSTRUMENT YANKAUER BULBOUS TIP W/O VENT (50EA/CA)

## (undated) DEVICE — SUTURE GENERAL

## (undated) DEVICE — SET LEADWIRE 5 LEAD BEDSIDE DISPOSABLE ECG (1SET OF 5/EA)

## (undated) DEVICE — ELECTRODE DUAL RETURN W/ CORD - (50/PK)

## (undated) DEVICE — GOWN WARMING STANDARD FLEX - (30/CA)

## (undated) DEVICE — SPONGE RADIOPAQUE CTN X-LG - STERILE (50PK/CA) MADE TO ORDER ITEM AND HAS A 4-6 WEEK LEAD TIME

## (undated) DEVICE — STAPLER SKIN DISP - (6/BX 10BX/CA) VISISTAT

## (undated) DEVICE — DRAPE MAGNETIC (INSTRA-MAG) - (30/CA)

## (undated) DEVICE — GLOVE BIOGEL PI INDICATOR SZ 7.0 SURGICAL PF LF - (50/BX 4BX/CA)

## (undated) DEVICE — FILTER BLOOD TRANSFUSION - (40/CA) (PALL)

## (undated) DEVICE — TOWELS CLOTH SURGICAL - (4/PK 20PK/CA)

## (undated) DEVICE — GLOVE BIOGEL SZ 8 SURGICAL PF LTX - (50PR/BX 4BX/CA)

## (undated) DEVICE — HEMOSTAT ABSORBABLE POWDER SURGICEL 3G (5EA/BX)

## (undated) DEVICE — SUTURE 0 SILK TIES (36PK/BX)

## (undated) DEVICE — TRAY SURESTEP FOLEY TEMP SENSING 16FR (10EA/CA) ORDER  #18764 FOR TEMP FOLEY ONLY

## (undated) DEVICE — SUTURE 1 VICRYL PLUS CT-1 - 18 INCH (12/BX)

## (undated) DEVICE — SUTURE 2-0 ETHILON FS - (36/BX) 18 INCH

## (undated) DEVICE — SENSOR OXIMETER ADULT SPO2 RD SET (20EA/BX)

## (undated) DEVICE — RESERVOIR SUCTION 100 CC - SILICONE (20EA/CA)

## (undated) DEVICE — CLIP MED LG INTNL HRZN TI ESCP - (20/BX)

## (undated) DEVICE — VESSELOOP MAXI BLUE STERILE- SURG-I-LOOP (10EA/BX)

## (undated) DEVICE — SUTURE 4-0 PROLENE RB-1 D/A 36 (36PK/BX)"

## (undated) DEVICE — CLIP LG INTNL HRZN TI ESCP LGT - (20/BX)

## (undated) DEVICE — PACK MAJOR BASIN - (2EA/CA)

## (undated) DEVICE — GOWN SURGEONS LARGE - (32/CA)

## (undated) DEVICE — TRAY CATHETER FOLEY URINE METER W/STATLOCK 350ML (10EA/CA)

## (undated) DEVICE — CLIP MED INTNL HRZN TI ESCP - (25/BX)

## (undated) DEVICE — SOD. CHL. INJ. 0.9% 1000 ML - (14EA/CA 60CA/PF)

## (undated) DEVICE — COVER LIGHT HANDLE ALC PLUS DISP (18EA/BX)

## (undated) DEVICE — SUTURE 0 COATED VICRYL 6-18IN - (12PK/BX)

## (undated) DEVICE — SLEEVE, VASO, THIGH, MED

## (undated) DEVICE — GOWN SURGEONS X-LARGE - DISP. (30/CA)

## (undated) DEVICE — LACTATED RINGERS INJ 1000 ML - (14EA/CA 60CA/PF)

## (undated) DEVICE — GLOVE SZ 7 BIOGEL PI MICRO - PF LF (50PR/BX 4BX/CA)

## (undated) DEVICE — SET EXTENSION WITH 2 PORTS (48EA/CA) ***PART #2C8610 IS A SUBSTITUTE*****

## (undated) DEVICE — SET BIFURCATED BLOOD - (48EA/CS)

## (undated) DEVICE — GLOVE BIOGEL SZ 7.5 SURGICAL PF LTX - (50PR/BX 4BX/CA)

## (undated) DEVICE — DRAPE LARGE 3 QUARTER - (20/CA)

## (undated) DEVICE — SUTURE 2-0 VICRYL PLUS SH - 27 INCH (36/BX)

## (undated) DEVICE — CONTAINER SPECIMEN BAG OR - STERILE 4 OZ W/LID (100EA/CA)

## (undated) DEVICE — GLOVE SZ 6.5 BIOGEL PI MICRO - PF LF (50PR/BX)

## (undated) DEVICE — CHLORAPREP 26 ML APPLICATOR - ORANGE TINT(25/CA)

## (undated) DEVICE — TUBING CLEARLINK DUO-VENT - C-FLO (48EA/CA)

## (undated) DEVICE — PAD LAP STERILE 18 X 18 - (5/PK 40PK/CA)

## (undated) DEVICE — SUTURE 1 VICRYL PLUSCT-1 27IN - (36/BX)

## (undated) DEVICE — DEVICE CLOSURE KIT VISTASEAL 4ML (1EA/BX)

## (undated) DEVICE — TUBE E-T HI-LO CUFF 7.5MM (10EA/PK)

## (undated) DEVICE — TRAY ANESTHESIA - CONTINUOUS EPIDURAL (10EA/CA) THIS IS A CUSTOM PACK

## (undated) DEVICE — CANISTER SUCTION 3000ML MECHANICAL FILTER AUTO SHUTOFF MEDI-VAC NONSTERILE LF DISP  (40EA/CA)

## (undated) DEVICE — SODIUM CHL IRRIGATION 0.9% 1000ML (12EA/CA)

## (undated) DEVICE — DRESSING NON-ADHERING 8 X 3 - (50/BX)

## (undated) DEVICE — SET FLUID WARMING STANDARD FLOW - (10/CA)